# Patient Record
Sex: FEMALE | Race: WHITE | NOT HISPANIC OR LATINO | Employment: OTHER | URBAN - METROPOLITAN AREA
[De-identification: names, ages, dates, MRNs, and addresses within clinical notes are randomized per-mention and may not be internally consistent; named-entity substitution may affect disease eponyms.]

---

## 2017-02-08 ENCOUNTER — TELEPHONE (OUTPATIENT)
Dept: FAMILY MEDICINE CLINIC | Facility: CLINIC | Age: 82
End: 2017-02-08

## 2017-02-08 NOTE — TELEPHONE ENCOUNTER
Referral to gaurav martinez for scope queztional bleeding ulcer +hemocult had been suggested before xmas pt wanted to wait ready to go now still problem

## 2017-02-09 DIAGNOSIS — R19.5 HEME POSITIVE STOOL: Primary | ICD-10-CM

## 2017-02-10 RX ORDER — ESOMEPRAZOLE MAGNESIUM 40 MG/1
CAPSULE, DELAYED RELEASE ORAL
Qty: 90 CAPSULE | Refills: 2 | Status: SHIPPED | OUTPATIENT
Start: 2017-02-10 | End: 2017-11-30

## 2017-03-13 RX ORDER — ROSUVASTATIN CALCIUM 5 MG/1
TABLET, COATED ORAL
Qty: 30 TABLET | Refills: 0 | Status: SHIPPED | OUTPATIENT
Start: 2017-03-13 | End: 2017-04-11 | Stop reason: SDUPTHER

## 2017-04-11 RX ORDER — ROSUVASTATIN CALCIUM 5 MG/1
TABLET, COATED ORAL
Qty: 30 TABLET | Refills: 0 | Status: SHIPPED | OUTPATIENT
Start: 2017-04-11 | End: 2017-05-17 | Stop reason: SDUPTHER

## 2017-05-02 RX ORDER — LISINOPRIL 40 MG/1
TABLET ORAL
Qty: 30 TABLET | Refills: 2 | Status: SHIPPED | OUTPATIENT
Start: 2017-05-02 | End: 2017-08-02 | Stop reason: SDUPTHER

## 2017-05-17 RX ORDER — ROSUVASTATIN CALCIUM 5 MG/1
TABLET, COATED ORAL
Qty: 30 TABLET | Refills: 3 | Status: SHIPPED | OUTPATIENT
Start: 2017-05-17 | End: 2017-09-08 | Stop reason: SDUPTHER

## 2017-08-02 RX ORDER — LISINOPRIL 40 MG/1
TABLET ORAL
Qty: 30 TABLET | Refills: 0 | Status: SHIPPED | OUTPATIENT
Start: 2017-08-02 | End: 2017-08-31 | Stop reason: SDUPTHER

## 2017-08-31 RX ORDER — LISINOPRIL 40 MG/1
TABLET ORAL
Qty: 30 TABLET | Refills: 0 | Status: SHIPPED | OUTPATIENT
Start: 2017-08-31 | End: 2017-10-01 | Stop reason: SDUPTHER

## 2017-09-08 ENCOUNTER — OFFICE VISIT (OUTPATIENT)
Dept: FAMILY MEDICINE CLINIC | Facility: CLINIC | Age: 82
End: 2017-09-08

## 2017-09-08 ENCOUNTER — TELEPHONE (OUTPATIENT)
Dept: FAMILY MEDICINE CLINIC | Facility: CLINIC | Age: 82
End: 2017-09-08

## 2017-09-08 VITALS
WEIGHT: 97 LBS | HEART RATE: 57 BPM | RESPIRATION RATE: 10 BRPM | DIASTOLIC BLOOD PRESSURE: 88 MMHG | TEMPERATURE: 97.8 F | OXYGEN SATURATION: 98 % | HEIGHT: 59 IN | BODY MASS INDEX: 19.56 KG/M2 | SYSTOLIC BLOOD PRESSURE: 172 MMHG

## 2017-09-08 DIAGNOSIS — M54.40 CHRONIC LEFT-SIDED LOW BACK PAIN WITH SCIATICA, SCIATICA LATERALITY UNSPECIFIED: ICD-10-CM

## 2017-09-08 DIAGNOSIS — N63.0 BREAST NODULE: ICD-10-CM

## 2017-09-08 DIAGNOSIS — R27.0 ATAXIA: ICD-10-CM

## 2017-09-08 DIAGNOSIS — M25.562 LEFT KNEE PAIN, UNSPECIFIED CHRONICITY: ICD-10-CM

## 2017-09-08 DIAGNOSIS — Z00.00 MEDICARE ANNUAL WELLNESS VISIT, INITIAL: Primary | ICD-10-CM

## 2017-09-08 DIAGNOSIS — E55.9 VITAMIN D DEFICIENCY: ICD-10-CM

## 2017-09-08 DIAGNOSIS — G89.29 CHRONIC LEFT-SIDED LOW BACK PAIN WITH SCIATICA, SCIATICA LATERALITY UNSPECIFIED: ICD-10-CM

## 2017-09-08 DIAGNOSIS — I10 ESSENTIAL HYPERTENSION: ICD-10-CM

## 2017-09-08 DIAGNOSIS — E78.2 MIXED HYPERLIPIDEMIA: ICD-10-CM

## 2017-09-08 DIAGNOSIS — K21.00 GASTROESOPHAGEAL REFLUX DISEASE WITH ESOPHAGITIS: ICD-10-CM

## 2017-09-08 LAB
25(OH)D3 SERPL-MCNC: 21.4 NG/ML (ref 30–100)
ALBUMIN SERPL-MCNC: 4.8 G/DL (ref 3.5–5.2)
ALBUMIN/GLOB SERPL: 1.8 G/DL
ALP SERPL-CCNC: 64 U/L (ref 39–117)
ALT SERPL W P-5'-P-CCNC: 10 U/L (ref 1–33)
ANION GAP SERPL CALCULATED.3IONS-SCNC: 13.4 MMOL/L
AST SERPL-CCNC: 16 U/L (ref 1–32)
BILIRUB SERPL-MCNC: 0.6 MG/DL (ref 0.1–1.2)
BUN BLD-MCNC: 8 MG/DL (ref 8–23)
BUN/CREAT SERPL: 11.6 (ref 7–25)
CALCIUM SPEC-SCNC: 10.2 MG/DL (ref 8.2–9.6)
CHLORIDE SERPL-SCNC: 99 MMOL/L (ref 98–107)
CHOLEST SERPL-MCNC: 170 MG/DL (ref 0–200)
CO2 SERPL-SCNC: 27.6 MMOL/L (ref 22–29)
CREAT BLD-MCNC: 0.69 MG/DL (ref 0.57–1)
ERYTHROCYTE [DISTWIDTH] IN BLOOD BY AUTOMATED COUNT: 13.7 % (ref 4.5–15)
FOLATE SERPL-MCNC: >20 NG/ML (ref 4.78–24.2)
GFR SERPL CREATININE-BSD FRML MDRD: 80 ML/MIN/1.73
GLOBULIN UR ELPH-MCNC: 2.6 GM/DL
GLUCOSE BLD-MCNC: 99 MG/DL (ref 65–99)
HCT VFR BLD AUTO: 38.8 % (ref 31–42)
HDLC SERPL-MCNC: 89 MG/DL (ref 40–60)
HGB BLD-MCNC: 12.8 G/DL (ref 12–18)
LDLC SERPL CALC-MCNC: 59 MG/DL (ref 0–100)
LDLC/HDLC SERPL: 0.66 {RATIO}
LYMPHOCYTES # BLD AUTO: 1.6 10*3/MM3 (ref 1.2–3.4)
LYMPHOCYTES NFR BLD AUTO: 29.8 % (ref 21–51)
MCH RBC QN AUTO: 30.1 PG (ref 26.1–33.1)
MCHC RBC AUTO-ENTMCNC: 33.1 G/DL (ref 33–37)
MCV RBC AUTO: 91 FL (ref 80–99)
MONOCYTES # BLD AUTO: 0.2 10*3/MM3 (ref 0.1–0.6)
MONOCYTES NFR BLD AUTO: 4.6 % (ref 2–9)
NEUTROPHILS # BLD AUTO: 3.5 10*3/MM3 (ref 1.4–6.5)
NEUTROPHILS NFR BLD AUTO: 65.6 % (ref 42–75)
PLATELET # BLD AUTO: 315 10*3/MM3 (ref 150–450)
PMV BLD AUTO: 7.8 FL (ref 7.1–10.5)
POTASSIUM BLD-SCNC: 4 MMOL/L (ref 3.5–5.2)
PROT SERPL-MCNC: 7.4 G/DL (ref 6–8.5)
RBC # BLD AUTO: 4.26 10*6/MM3 (ref 4–6)
SODIUM BLD-SCNC: 140 MMOL/L (ref 136–145)
TRIGL SERPL-MCNC: 112 MG/DL (ref 0–150)
VIT B12 BLD-MCNC: >2000 PG/ML (ref 211–946)
VLDLC SERPL-MCNC: 22.4 MG/DL (ref 5–40)
WBC NRBC COR # BLD: 5.4 10*3/MM3 (ref 4.5–10)

## 2017-09-08 PROCEDURE — 82746 ASSAY OF FOLIC ACID SERUM: CPT | Performed by: INTERNAL MEDICINE

## 2017-09-08 PROCEDURE — 99214 OFFICE O/P EST MOD 30 MIN: CPT | Performed by: INTERNAL MEDICINE

## 2017-09-08 PROCEDURE — 82607 VITAMIN B-12: CPT | Performed by: INTERNAL MEDICINE

## 2017-09-08 PROCEDURE — 82306 VITAMIN D 25 HYDROXY: CPT | Performed by: INTERNAL MEDICINE

## 2017-09-08 PROCEDURE — 80053 COMPREHEN METABOLIC PANEL: CPT | Performed by: INTERNAL MEDICINE

## 2017-09-08 PROCEDURE — 36415 COLL VENOUS BLD VENIPUNCTURE: CPT | Performed by: INTERNAL MEDICINE

## 2017-09-08 PROCEDURE — 80061 LIPID PANEL: CPT | Performed by: INTERNAL MEDICINE

## 2017-09-08 PROCEDURE — G0438 PPPS, INITIAL VISIT: HCPCS | Performed by: INTERNAL MEDICINE

## 2017-09-08 PROCEDURE — 96372 THER/PROPH/DIAG INJ SC/IM: CPT | Performed by: INTERNAL MEDICINE

## 2017-09-08 PROCEDURE — 72100 X-RAY EXAM L-S SPINE 2/3 VWS: CPT | Performed by: INTERNAL MEDICINE

## 2017-09-08 PROCEDURE — 73560 X-RAY EXAM OF KNEE 1 OR 2: CPT | Performed by: INTERNAL MEDICINE

## 2017-09-08 PROCEDURE — 85025 COMPLETE CBC W/AUTO DIFF WBC: CPT | Performed by: INTERNAL MEDICINE

## 2017-09-08 RX ORDER — TRAMADOL HYDROCHLORIDE 50 MG/1
50 TABLET ORAL EVERY 6 HOURS PRN
Qty: 30 TABLET | Refills: 3 | Status: SHIPPED | OUTPATIENT
Start: 2017-09-08 | End: 2017-12-08 | Stop reason: HOSPADM

## 2017-09-08 RX ORDER — CYANOCOBALAMIN 1000 UG/ML
1000 INJECTION, SOLUTION INTRAMUSCULAR; SUBCUTANEOUS
Status: DISCONTINUED | OUTPATIENT
Start: 2017-09-08 | End: 2018-02-16

## 2017-09-08 RX ORDER — AMLODIPINE BESYLATE 5 MG/1
5 TABLET ORAL DAILY
Qty: 30 TABLET | Refills: 3 | Status: SHIPPED | OUTPATIENT
Start: 2017-09-08 | End: 2018-03-07 | Stop reason: SDUPTHER

## 2017-09-08 RX ADMIN — CYANOCOBALAMIN 1000 MCG: 1000 INJECTION, SOLUTION INTRAMUSCULAR; SUBCUTANEOUS at 12:19

## 2017-09-08 NOTE — PROGRESS NOTES
QUICK REFERENCE INFORMATION:  The ABCs of the Annual Wellness Visit    Initial Medicare Wellness Visit    HEALTH RISK ASSESSMENT    5/20/1927    Recent Hospitalizations:  No hospitalization(s) within the last year..        Current Medical Providers:  Patient Care Team:  George Rock MD as PCP - General  Pastor Crews MD as Consulting Physician (Gastroenterology)        Smoking Status:  History   Smoking Status   • Never Smoker   Smokeless Tobacco   • Not on file       Alcohol Consumption:  History   Alcohol Use No       Depression Screen:   PHQ-2/PHQ-9 Depression Screening 9/8/2017   Little interest or pleasure in doing things 0   Feeling down, depressed, or hopeless 0   Trouble falling or staying asleep, or sleeping too much 0   Feeling tired or having little energy 0   Poor appetite or overeating 0   Feeling bad about yourself - or that you are a failure or have let yourself or your family down 0   Trouble concentrating on things, such as reading the newspaper or watching television 0   Moving or speaking so slowly that other people could have noticed. Or the opposite - being so fidgety or restless that you have been moving around a lot more than usual 0   Thoughts that you would be better off dead, or of hurting yourself in some way 0   Total Score 0   If you checked off any problems, how difficult have these problems made it for you to do your work, take care of things at home, or get along with other people? Not difficult at all       Health Habits and Functional and Cognitive Screening:  Functional & Cognitive Status 9/8/2017   Do you have difficulty preparing food and eating? No   Do you have difficulty bathing yourself? No   Do you have difficulty getting dressed? No   Do you have difficulty using the toilet? No   Do you have difficulty moving around from place to place? No   In the past year have you fallen or experienced a near fall? No   Do you need help using the phone?  No   Are you deaf or do you  have serious difficulty hearing?  No   Do you need help with transportation? No   Do you need help shopping? No   Do you need help preparing meals?  No   Do you need help with housework?  No   Do you need help with laundry? No   Do you need help taking your medications? No   Do you need help managing money? No   Do you have difficulty concentrating, remembering or making decisions? No       Health Habits  Current Diet: Well Balanced Diet  Dental Exam: Up to date  Eye Exam: Up to date  Exercise (times per week): 7 times per week  Current Exercise Activities Include: Gardening          Does the patient have evidence of cognitive impairment? No    Asiprin use counseling: Does not need ASA (and currently is not on it)      Recent Lab Results:    Visual Acuity:  No exam data present    Age-appropriate Screening Schedule:  Refer to the list below for future screening recommendations based on patient's age, sex and/or medical conditions. Orders for these recommended tests are listed in the plan section. The patient has been provided with a written plan.    Health Maintenance   Topic Date Due   • TDAP/TD VACCINES (1 - Tdap) 05/20/1946   • ZOSTER VACCINE  06/21/2016   • MAMMOGRAM  06/22/2016   • DIABETIC FOOT EXAM  12/16/2016   • HEMOGLOBIN A1C  06/23/2017   • LIPID PANEL  12/23/2017   • PNEUMOCOCCAL VACCINES (65+ LOW/MEDIUM RISK) (2 of 2 - PPSV23) 09/08/2018   • DIABETIC EYE EXAM  09/08/2018   • INFLUENZA VACCINE  Addressed        Subjective   History of Present Illness    Cristal Sams is a 89 y.o. female who presents for an Annual Wellness Visit.    The following portions of the patient's history were reviewed and updated as appropriate: allergies, current medications, past family history, past medical history, past social history, past surgical history and problem list.    Outpatient Medications Prior to Visit   Medication Sig Dispense Refill   • CRESTOR 5 MG tablet TAKE ONE TABLET BY MOUTH DAILY 30 tablet 3   •  "esomeprazole (nexIUM) 40 MG capsule TAKE ONE CAPSULE BY MOUTH DAILY 90 capsule 2   • glucose blood (ONE TOUCH ULTRA TEST) test strip Test Blood Sugar Once Daily 100 each 2   • Lancets (ONETOUCH ULTRASOFT) lancets E11.9  Checks once daily 100 each 3   • lisinopril (PRINIVIL,ZESTRIL) 40 MG tablet TAKE ONE TABLET BY MOUTH DAILY 30 tablet 0   • Iron, Ferrous Gluconate, 256 (28 FE) MG tablet Take 1 tablet by mouth Daily. 30 tablet 3   • rosuvastatin (CRESTOR) 5 MG tablet TAKE ONE TABLET BY MOUTH DAILY 30 tablet 3   • vitamin D (ERGOCALCIFEROL) 41144 UNITS capsule capsule Take 1 capsule by mouth 1 (One) Time Per Week. 30 capsule 1     Facility-Administered Medications Prior to Visit   Medication Dose Route Frequency Provider Last Rate Last Dose   • cyanocobalamin injection 1,000 mcg  1,000 mcg Intramuscular Q28 Days George Rock MD   1,000 mcg at 12/23/16 1527       Patient Active Problem List   Diagnosis   • Osteoarthritis of multiple joints   • Hyperlipemia   • Gastroesophageal reflux disease with esophagitis   • B12 deficiency   • Essential hypertension   • Anemia   • DM (diabetes mellitus), type 2   • Heme positive stool   • Ataxia       Advance Care Planning:  has an advance directive - a copy HAS NOT been provided. Have asked the patient to send this to us to add to record.    Identification of Risk Factors:  Risk factors include: NA.    Review of Systems    Compared to one year ago, the patient feels her physical health is worse.  Compared to one year ago, the patient feels her mental health is worse.    Objective     Physical Exam    Vitals:    09/08/17 1138   BP: 172/88   BP Location: Left arm   Patient Position: Sitting   Cuff Size: Adult   Pulse: 57   Resp: 10   Temp: 97.8 °F (36.6 °C)   TempSrc: Oral   SpO2: 98%   Weight: 97 lb (44 kg)   Height: 59\" (149.9 cm)   PainSc:   4   PainLoc: Leg       Body mass index is 19.59 kg/(m^2).  Discussed the patient's BMI with her. The BMI is in the acceptable " range.    Assessment/Plan   Patient Self-Management and Personalized Health Advice  The patient has been provided with information about: NA and preventive services including:   · NA.    Visit Diagnoses:    ICD-10-CM ICD-9-CM   1. Essential hypertension I10 401.9   2. Mixed hyperlipidemia E78.2 272.2   3. Gastroesophageal reflux disease with esophagitis K21.0 530.11   4. Ataxia R27.0 781.3       Orders Placed This Encounter   Procedures   • Walker     Order Specific Question:   Equipment     Answer:    Standard Walker Folding     Order Specific Question:   Length of Need (99 Months = Lifetime)     Answer:   99 Months = Lifetime       Outpatient Encounter Prescriptions as of 9/8/2017   Medication Sig Dispense Refill   • CRESTOR 5 MG tablet TAKE ONE TABLET BY MOUTH DAILY 30 tablet 3   • esomeprazole (nexIUM) 40 MG capsule TAKE ONE CAPSULE BY MOUTH DAILY 90 capsule 2   • glucose blood (ONE TOUCH ULTRA TEST) test strip Test Blood Sugar Once Daily 100 each 2   • Lancets (ONETOUCH ULTRASOFT) lancets E11.9  Checks once daily 100 each 3   • lisinopril (PRINIVIL,ZESTRIL) 40 MG tablet TAKE ONE TABLET BY MOUTH DAILY 30 tablet 0   • Iron, Ferrous Gluconate, 256 (28 FE) MG tablet Take 1 tablet by mouth Daily. 30 tablet 3   • [DISCONTINUED] rosuvastatin (CRESTOR) 5 MG tablet TAKE ONE TABLET BY MOUTH DAILY 30 tablet 3   • [DISCONTINUED] vitamin D (ERGOCALCIFEROL) 97281 UNITS capsule capsule Take 1 capsule by mouth 1 (One) Time Per Week. 30 capsule 1     Facility-Administered Encounter Medications as of 9/8/2017   Medication Dose Route Frequency Provider Last Rate Last Dose   • cyanocobalamin injection 1,000 mcg  1,000 mcg Intramuscular Q28 Days George Rock MD   1,000 mcg at 12/23/16 1527       Reviewed use of high risk medication in the elderly: not applicable  Reviewed for potential of harmful drug interactions in the elderly: not applicable    Follow Up:  No Follow-up on file.     An After Visit Summary and PPPS with  all of these plans were given to the patient.

## 2017-09-08 NOTE — PROGRESS NOTES
Subjective   Cristal Sams is a 89 y.o. female.     History of Present Illness   Patient was seen today for Medicare wellness visit.  She is also developed over last several weeks severe pain rating down her left leg.  She had a prior history of trauma to her left knee.  The pain is worse with standing or walking and relieved with sitting.  She will not take any Medication.  Her knee x-ray was normal but her L-spine shows severe scoliosis of lumbar spine with severe degenerative changes.  She also had an inverted left nipple with a nodule and mammogram was ordered she had a back brace ordered with physical therapy and tramadol advised to take with Tylenol.  She was also given movantik for possible drug-induced constipation.    X-Ray  Interpretation report in house X-rays that I personally viewed    Relevant Clinical Issues/Diagnoses/Indications:  Low back pain L-spine        Clinical Findings:  #1 severe scoliosis #2 severe degenerative changes along the lumbar spine          Comparative Data:  No previous x-ray          Date of Previous X-ray:  Change on current X-ray    X-Ray  Interpretation report in house X-rays that I personally viewed    Relevant Clinical Issues/Diagnoses/Indications:  Left knee pain knee x-ray        Clinical Findings:  No acute disease          Comparative Data:  No previous x-ray          Date of Previous X-ray:  Change on current X-ray    Much of this encounter note is an electronic transcription/translation of spoken language to printed text.  The electronic translation of spoken language may permit erroneous, or at times, nonsensical words or phrases to be inadvertently transcribed.  Although I have reviewed the note for such errors, some may still exist.      The following portions of the patient's history were reviewed and updated as appropriate: allergies, current medications, past family history, past medical history, past social history, past surgical history and problem  list.    Review of Systems   Constitutional: Negative for fatigue and fever.   HENT: Positive for congestion. Negative for trouble swallowing.    Eyes: Negative for discharge and visual disturbance.   Respiratory: Negative for choking and shortness of breath.    Cardiovascular: Negative for chest pain and palpitations.   Gastrointestinal: Negative for abdominal pain and blood in stool.   Endocrine: Negative.    Genitourinary: Negative for genital sores and hematuria.   Musculoskeletal: Positive for back pain. Negative for gait problem and joint swelling.        Severe left knee pain   Skin: Negative for color change, pallor, rash and wound.   Allergic/Immunologic: Positive for environmental allergies. Negative for immunocompromised state.   Neurological: Negative for facial asymmetry and speech difficulty.   Psychiatric/Behavioral: Negative for hallucinations and suicidal ideas.       Objective   Physical Exam   Constitutional: She is oriented to person, place, and time. She appears well-developed and well-nourished.   HENT:   Head: Normocephalic.   Eyes: Conjunctivae are normal. Pupils are equal, round, and reactive to light.   Neck: Normal range of motion. Neck supple.   Cardiovascular: Normal rate, regular rhythm and normal heart sounds.    Pulmonary/Chest: Effort normal and breath sounds normal.   Abdominal: Soft. Bowel sounds are normal.   Musculoskeletal: She exhibits edema, tenderness and deformity.   Severe pain flexion extension of left leg tenderness palpation along the lumbar spine and pain to palpation of the anterior portion of the left knee   Neurological: She is alert and oriented to person, place, and time.   Skin: Skin is warm and dry.   Psychiatric: She has a normal mood and affect. Her behavior is normal. Judgment and thought content normal.   Nursing note and vitals reviewed.      Assessment/Plan   Problems Addressed this Visit        Cardiovascular and Mediastinum    Hyperlipemia    Relevant  Medications    cyanocobalamin injection 1,000 mcg    Other Relevant Orders    CBC & Differential (Completed)    Comprehensive Metabolic Panel    Lipid Panel    Vitamin D 25 Hydroxy    Vitamin B12 & Folate    CBC Auto Differential (Completed)    Ambulatory Referral to Physical Therapy (Completed)    Back Brace    Essential hypertension    Relevant Medications    amLODIPine (NORVASC) 5 MG tablet    cyanocobalamin injection 1,000 mcg    Other Relevant Orders    CBC & Differential (Completed)    Comprehensive Metabolic Panel    Lipid Panel    Vitamin D 25 Hydroxy    Vitamin B12 & Folate    CBC Auto Differential (Completed)    Ambulatory Referral to Physical Therapy (Completed)    Back Brace       Digestive    Gastroesophageal reflux disease with esophagitis    Relevant Medications    cyanocobalamin injection 1,000 mcg    Other Relevant Orders    CBC & Differential (Completed)    Comprehensive Metabolic Panel    Lipid Panel    Vitamin D 25 Hydroxy    Vitamin B12 & Folate    CBC Auto Differential (Completed)    Ambulatory Referral to Physical Therapy (Completed)    Back Brace       Other    Ataxia    Relevant Medications    cyanocobalamin injection 1,000 mcg    Other Relevant Orders    Walker    CBC & Differential (Completed)    Comprehensive Metabolic Panel    Lipid Panel    Vitamin D 25 Hydroxy    Vitamin B12 & Folate    CBC Auto Differential (Completed)    Ambulatory Referral to Physical Therapy (Completed)    Back Brace      Other Visit Diagnoses     Medicare annual wellness visit, initial    -  Primary    Relevant Medications    cyanocobalamin injection 1,000 mcg    Other Relevant Orders    CBC & Differential (Completed)    Comprehensive Metabolic Panel    Lipid Panel    Vitamin D 25 Hydroxy    Vitamin B12 & Folate    CBC Auto Differential (Completed)    Ambulatory Referral to Physical Therapy (Completed)    Back Brace    Left knee pain, unspecified chronicity        Relevant Medications    cyanocobalamin injection  1,000 mcg    Other Relevant Orders    XR Knee 1 or 2 View Left (Completed)    XR Spine Lumbar 2 or 3 View (Completed)    CBC & Differential (Completed)    Comprehensive Metabolic Panel    Lipid Panel    Vitamin D 25 Hydroxy    Vitamin B12 & Folate    CBC Auto Differential (Completed)    Ambulatory Referral to Physical Therapy (Completed)    Back Brace    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, sciatica laterality unspecified        Relevant Medications    cyanocobalamin injection 1,000 mcg    Other Relevant Orders    XR Knee 1 or 2 View Left (Completed)    XR Spine Lumbar 2 or 3 View (Completed)    CBC & Differential (Completed)    Comprehensive Metabolic Panel    Lipid Panel    Vitamin D 25 Hydroxy    Vitamin B12 & Folate    CBC Auto Differential (Completed)    Ambulatory Referral to Physical Therapy (Completed)    Back Brace    Vitamin D deficiency         Relevant Orders    Vitamin D 25 Hydroxy    Ambulatory Referral to Physical Therapy (Completed)    Back Brace    Breast nodule        Relevant Orders    Mammo Diagnostic Bilateral With CAD

## 2017-09-08 NOTE — PATIENT INSTRUCTIONS
Medicare Wellness  Personal Prevention Plan of Service     Date of Office Visit:  2017  Encounter Provider:  George Rock MD  Place of Service:  [facility]  Patient Name: Cristal Sams  :  1927    As part of the Medicare Wellness portion of your visit today, we are providing you with this personalized preventive plan of services (PPPS). This plan is based upon recommendations of the [place] Preventive Services Task Force (USPSTF) and the Advisory Committee on Immunization Practices (ACIP).    This lists the preventive care services that should be considered, and provides dates of when you are due. Items listed as completed are up-to-date and do not require any further intervention.    Health Maintenance   Topic Date Due   • TDAP/TD VACCINES (1 - Tdap) 1946   • MEDICARE ANNUAL WELLNESS  2016   • ZOSTER VACCINE  2016   • MAMMOGRAM  2016   • DIABETIC FOOT EXAM  2016   • HEMOGLOBIN A1C  2017   • LIPID PANEL  2017   • PNEUMOCOCCAL VACCINES (65+ LOW/MEDIUM RISK) (2 of 2 - PPSV23) 2018   • DIABETIC EYE EXAM  2018   • INFLUENZA VACCINE  Addressed       Orders Placed This Encounter   Procedures   • Walker     Order Specific Question:   Equipment     Answer:    Standard Walker Folding     Order Specific Question:   Length of Need (99 Months = Lifetime)     Answer:   99 Months = Lifetime       No Follow-up on file.

## 2017-09-08 NOTE — TELEPHONE ENCOUNTER
PATIENTS BLOOD PRESSURE IS HIGH AND LEG IS PAINFUL. PATIENT WENT TO [facility] LAST NIGHT WAS THERE FOR OVER 3  HOURS AND ENDED UP LEAVING. PATIENT WANT TO KNOW IF SHE CAN BE WORKED IN

## 2017-09-12 ENCOUNTER — TELEPHONE (OUTPATIENT)
Dept: FAMILY MEDICINE CLINIC | Facility: CLINIC | Age: 82
End: 2017-09-12

## 2017-09-12 NOTE — TELEPHONE ENCOUNTER
PATIENT IS SUPPOSE TO COME IN 4 DAYS AFTER HER MAMMOGRAM AND THAT FALL ON THE WEEKEND. WANTS TO KNOW WHEN SHE'S SUPPOSE TO COME IN. PATIENT WANTS TO TALK TO YOU ABOUT A COUPLE OTHER THINGS

## 2017-09-13 ENCOUNTER — HOSPITAL ENCOUNTER (OUTPATIENT)
Dept: ULTRASOUND IMAGING | Facility: HOSPITAL | Age: 82
Discharge: HOME | End: 2017-09-13

## 2017-09-13 ENCOUNTER — HOSPITAL ENCOUNTER (OUTPATIENT)
Dept: MAMMOGRAPHY | Facility: HOSPITAL | Age: 82
Discharge: HOME | End: 2017-09-13
Admitting: INTERNAL MEDICINE

## 2017-09-13 DIAGNOSIS — N63.0 BREAST NODULE: ICD-10-CM

## 2017-09-13 PROCEDURE — 76642 ULTRASOUND BREAST LIMITED: CPT

## 2017-09-13 PROCEDURE — G0204 DX MAMMO INCL CAD BI: HCPCS

## 2017-09-14 ENCOUNTER — TELEPHONE (OUTPATIENT)
Dept: FAMILY MEDICINE CLINIC | Facility: CLINIC | Age: 82
End: 2017-09-14

## 2017-09-14 DIAGNOSIS — N63.0 BREAST NODULE: Primary | ICD-10-CM

## 2017-09-18 ENCOUNTER — TELEPHONE (OUTPATIENT)
Dept: FAMILY MEDICINE CLINIC | Facility: CLINIC | Age: 82
End: 2017-09-18

## 2017-09-18 NOTE — TELEPHONE ENCOUNTER
PATIENT HAS AN APPOINTMENT TOMORROW WITH [facility] FOR ULTRASOUND OF LEFT BREAST BIOPSY. PATIENT ALSO HAS APPT. TO SEE DR. HERMAN TOMORROW AT 5:15 PATIENT WANTS TO KNOW IF SHE NEED TO KEEP APPT. WITH DR. HERMAN

## 2017-09-18 NOTE — TELEPHONE ENCOUNTER
Pt informed Dr Rock does want to see her again but if the appt time conflicts with U/S and biospy she can reschedule for another day

## 2017-09-19 ENCOUNTER — OFFICE VISIT (OUTPATIENT)
Dept: FAMILY MEDICINE CLINIC | Facility: CLINIC | Age: 82
End: 2017-09-19

## 2017-09-19 VITALS
HEART RATE: 56 BPM | WEIGHT: 98 LBS | OXYGEN SATURATION: 98 % | DIASTOLIC BLOOD PRESSURE: 80 MMHG | TEMPERATURE: 97.5 F | RESPIRATION RATE: 14 BRPM | BODY MASS INDEX: 19.76 KG/M2 | HEIGHT: 59 IN | SYSTOLIC BLOOD PRESSURE: 140 MMHG

## 2017-09-19 DIAGNOSIS — E11.22 TYPE 2 DIABETES MELLITUS WITH CHRONIC KIDNEY DISEASE, WITHOUT LONG-TERM CURRENT USE OF INSULIN, UNSPECIFIED CKD STAGE (HCC): ICD-10-CM

## 2017-09-19 DIAGNOSIS — E83.52 HYPERCALCEMIA: ICD-10-CM

## 2017-09-19 DIAGNOSIS — E78.5 HYPERLIPIDEMIA, UNSPECIFIED HYPERLIPIDEMIA TYPE: ICD-10-CM

## 2017-09-19 DIAGNOSIS — I10 ESSENTIAL HYPERTENSION: ICD-10-CM

## 2017-09-19 DIAGNOSIS — N63.0 BREAST NODULE: Primary | ICD-10-CM

## 2017-09-19 LAB — PTH-INTACT SERPL-MCNC: 74.2 PG/ML (ref 15–65)

## 2017-09-19 PROCEDURE — 36415 COLL VENOUS BLD VENIPUNCTURE: CPT | Performed by: INTERNAL MEDICINE

## 2017-09-19 PROCEDURE — 83970 ASSAY OF PARATHORMONE: CPT | Performed by: INTERNAL MEDICINE

## 2017-09-19 PROCEDURE — 99214 OFFICE O/P EST MOD 30 MIN: CPT | Performed by: INTERNAL MEDICINE

## 2017-09-19 NOTE — PROGRESS NOTES
Subjective   Cristal Sams is a 89 y.o. female.     History of Present Illness   Patient was seen today for a mammogram result.  She did have her breast nodule left breast an ultrasound-guided biopsies being scheduled.  She also had lab work back calcium level was 10.2 and PTH level was checked today.  Her vitamin D level was 21.7 she was advised to 50,000 units vitamin D weekly.  Her triglycerides were 112, HDL 89, LDL 59, blood sugar 99.    Much of this encounter note is an electronic transcription/translation of spoken language to printed text.  The electronic translation of spoken language may permit erroneous, or at times, nonsensical words or phrases to be inadvertently transcribed.  Although I have reviewed the note for such errors, some may still exist.  The following portions of the patient's history were reviewed and updated as appropriate: allergies, current medications, past family history, past medical history, past social history, past surgical history and problem list.    Review of Systems   Constitutional: Negative for fatigue and fever.   HENT: Positive for congestion. Negative for trouble swallowing.    Eyes: Negative for discharge and visual disturbance.   Respiratory: Negative for choking and shortness of breath.    Cardiovascular: Negative for chest pain and palpitations.   Gastrointestinal: Negative for abdominal pain and blood in stool.   Endocrine: Negative.    Genitourinary: Negative for genital sores and hematuria.   Musculoskeletal: Negative for gait problem and joint swelling.   Skin: Negative for color change, pallor, rash and wound.   Allergic/Immunologic: Positive for environmental allergies. Negative for immunocompromised state.   Neurological: Negative for facial asymmetry and speech difficulty.   Psychiatric/Behavioral: Negative for hallucinations and suicidal ideas.       Objective   Physical Exam   Constitutional: She is oriented to person, place, and time. She appears  well-developed and well-nourished.   HENT:   Head: Normocephalic.   Eyes: Conjunctivae are normal. Pupils are equal, round, and reactive to light.   Neck: Normal range of motion. Neck supple.   Cardiovascular: Normal rate, regular rhythm and normal heart sounds.    Pulmonary/Chest: Effort normal and breath sounds normal.   Abdominal: Soft. Bowel sounds are normal.   Musculoskeletal: Normal range of motion.   Neurological: She is alert and oriented to person, place, and time.   Skin: Skin is warm and dry.   Psychiatric: She has a normal mood and affect. Her behavior is normal. Judgment and thought content normal.   Nursing note and vitals reviewed.      Assessment/Plan   Problems Addressed this Visit        Cardiovascular and Mediastinum    Hyperlipemia    Essential hypertension       Endocrine    DM (diabetes mellitus), type 2       Other    Breast nodule - Primary    Hypercalcemia    Relevant Orders    PTH, Intact

## 2017-09-20 ENCOUNTER — TELEPHONE (OUTPATIENT)
Dept: FAMILY MEDICINE CLINIC | Facility: CLINIC | Age: 82
End: 2017-09-20

## 2017-09-20 DIAGNOSIS — R79.89 ELEVATED PTHRP LEVEL: Primary | ICD-10-CM

## 2017-09-20 NOTE — TELEPHONE ENCOUNTER
Pt would like to know if she is being set up for a biopsy for her breast and to go see someone for her thyroid

## 2017-09-26 ENCOUNTER — HOSPITAL ENCOUNTER (OUTPATIENT)
Dept: ULTRASOUND IMAGING | Facility: HOSPITAL | Age: 82
Discharge: HOME | End: 2017-09-26
Admitting: INTERNAL MEDICINE

## 2017-09-26 VITALS
TEMPERATURE: 95.9 F | HEART RATE: 60 BPM | WEIGHT: 95 LBS | DIASTOLIC BLOOD PRESSURE: 63 MMHG | OXYGEN SATURATION: 100 % | HEIGHT: 60 IN | RESPIRATION RATE: 18 BRPM | BODY MASS INDEX: 18.65 KG/M2 | SYSTOLIC BLOOD PRESSURE: 131 MMHG

## 2017-09-26 DIAGNOSIS — N63.0 BREAST NODULE: ICD-10-CM

## 2017-09-26 PROCEDURE — A4648 IMPLANTABLE TISSUE MARKER: HCPCS

## 2017-09-26 PROCEDURE — 88305 TISSUE EXAM BY PATHOLOGIST: CPT | Performed by: INTERNAL MEDICINE

## 2017-09-26 PROCEDURE — 88377 M/PHMTRC ALYS ISHQUANT/SEMIQ: CPT

## 2017-09-26 PROCEDURE — 88360 TUMOR IMMUNOHISTOCHEM/MANUAL: CPT

## 2017-09-26 RX ORDER — LIDOCAINE HYDROCHLORIDE 10 MG/ML
20 INJECTION, SOLUTION INFILTRATION; PERINEURAL ONCE
Status: COMPLETED | OUTPATIENT
Start: 2017-09-26 | End: 2017-09-26

## 2017-09-26 RX ADMIN — LIDOCAINE HYDROCHLORIDE 20 ML: 10 INJECTION, SOLUTION INFILTRATION; PERINEURAL at 10:36

## 2017-09-26 NOTE — NURSING NOTE
Biopsy site to left breast clear with Skin Affix dry and intact. No new firmness or swelling noted at or around biopsy site. Denies pain. Ice pack with protective covering applied to biopsy site. Discharge instructions discussed with understanding voiced by patient. Copies provided to patient. No distress noted. To home via private vehicle accompanied by her niece.

## 2017-09-27 PROBLEM — N63.0 BREAST NODULE: Status: RESOLVED | Noted: 2017-09-19 | Resolved: 2017-09-27

## 2017-09-27 PROBLEM — C50.919 MALIGNANT NEOPLASM OF FEMALE BREAST (HCC): Status: ACTIVE | Noted: 2017-09-27

## 2017-09-28 ENCOUNTER — TELEPHONE (OUTPATIENT)
Dept: FAMILY MEDICINE CLINIC | Facility: CLINIC | Age: 82
End: 2017-09-28

## 2017-09-28 DIAGNOSIS — C50.012 MALIGNANT NEOPLASM OF NIPPLE OF LEFT BREAST IN FEMALE (HCC): Primary | ICD-10-CM

## 2017-10-02 RX ORDER — LISINOPRIL 40 MG/1
TABLET ORAL
Qty: 30 TABLET | Refills: 0 | Status: SHIPPED | OUTPATIENT
Start: 2017-10-02 | End: 2017-11-02 | Stop reason: SDUPTHER

## 2017-10-02 RX ORDER — ROSUVASTATIN CALCIUM 5 MG/1
TABLET, COATED ORAL
Qty: 30 TABLET | Refills: 2 | Status: SHIPPED | OUTPATIENT
Start: 2017-10-02 | End: 2017-10-06

## 2017-10-03 LAB
CYTO UR: NORMAL
LAB AP CASE REPORT: NORMAL
LAB AP CLINICAL INFORMATION: NORMAL
LAB AP INTRADEPARTMENTAL CONSULT: NORMAL
Lab: NORMAL
Lab: NORMAL
PATH REPORT.ADDENDUM SPEC: NORMAL
PATH REPORT.FINAL DX SPEC: NORMAL
PATH REPORT.GROSS SPEC: NORMAL

## 2017-10-06 ENCOUNTER — APPOINTMENT (OUTPATIENT)
Dept: ONCOLOGY | Facility: CLINIC | Age: 82
End: 2017-10-06

## 2017-10-06 ENCOUNTER — CONSULT (OUTPATIENT)
Dept: ONCOLOGY | Facility: CLINIC | Age: 82
End: 2017-10-06

## 2017-10-06 ENCOUNTER — TELEPHONE (OUTPATIENT)
Dept: SURGERY | Facility: CLINIC | Age: 82
End: 2017-10-06

## 2017-10-06 ENCOUNTER — LAB (OUTPATIENT)
Dept: LAB | Facility: HOSPITAL | Age: 82
End: 2017-10-06

## 2017-10-06 VITALS
DIASTOLIC BLOOD PRESSURE: 68 MMHG | WEIGHT: 96.8 LBS | OXYGEN SATURATION: 99 % | HEIGHT: 58 IN | RESPIRATION RATE: 16 BRPM | BODY MASS INDEX: 20.32 KG/M2 | HEART RATE: 57 BPM | SYSTOLIC BLOOD PRESSURE: 118 MMHG | TEMPERATURE: 97.6 F

## 2017-10-06 DIAGNOSIS — C50.529: Primary | ICD-10-CM

## 2017-10-06 DIAGNOSIS — C50.912 MALIGNANT NEOPLASM OF LEFT FEMALE BREAST, UNSPECIFIED ESTROGEN RECEPTOR STATUS, UNSPECIFIED SITE OF BREAST (HCC): Primary | ICD-10-CM

## 2017-10-06 DIAGNOSIS — M81.0 AGE RELATED OSTEOPOROSIS, UNSPECIFIED PATHOLOGICAL FRACTURE PRESENCE: ICD-10-CM

## 2017-10-06 DIAGNOSIS — C50.912 MALIGNANT NEOPLASM OF LEFT FEMALE BREAST, UNSPECIFIED ESTROGEN RECEPTOR STATUS, UNSPECIFIED SITE OF BREAST (HCC): ICD-10-CM

## 2017-10-06 DIAGNOSIS — Z17.0: Primary | ICD-10-CM

## 2017-10-06 LAB
25(OH)D3 SERPL-MCNC: 23.5 NG/ML (ref 30–100)
ALBUMIN SERPL-MCNC: 4.4 G/DL (ref 3.5–5.2)
ALBUMIN/GLOB SERPL: 1.7 G/DL (ref 1.1–2.4)
ALP SERPL-CCNC: 61 U/L (ref 38–116)
ALT SERPL W P-5'-P-CCNC: 14 U/L (ref 0–33)
ANION GAP SERPL CALCULATED.3IONS-SCNC: 9.8 MMOL/L
AST SERPL-CCNC: 15 U/L (ref 0–32)
BASOPHILS # BLD AUTO: 0.03 10*3/MM3 (ref 0–0.1)
BASOPHILS NFR BLD AUTO: 0.5 % (ref 0–1.1)
BILIRUB SERPL-MCNC: 0.5 MG/DL (ref 0.1–1.2)
BUN BLD-MCNC: 8 MG/DL (ref 6–20)
BUN/CREAT SERPL: 13.1 (ref 7.3–30)
CALCIUM SPEC-SCNC: 9.8 MG/DL (ref 8.5–10.2)
CHLORIDE SERPL-SCNC: 101 MMOL/L (ref 98–107)
CO2 SERPL-SCNC: 29.2 MMOL/L (ref 22–29)
CREAT BLD-MCNC: 0.61 MG/DL (ref 0.6–1.1)
DEPRECATED RDW RBC AUTO: 45.2 FL (ref 37–49)
EOSINOPHIL # BLD AUTO: 0.06 10*3/MM3 (ref 0–0.36)
EOSINOPHIL NFR BLD AUTO: 1.1 % (ref 1–5)
ERYTHROCYTE [DISTWIDTH] IN BLOOD BY AUTOMATED COUNT: 13.3 % (ref 11.7–14.5)
GFR SERPL CREATININE-BSD FRML MDRD: 92 ML/MIN/1.73
GLOBULIN UR ELPH-MCNC: 2.6 GM/DL (ref 1.8–3.5)
GLUCOSE BLD-MCNC: 97 MG/DL (ref 74–124)
HCT VFR BLD AUTO: 37.7 % (ref 34–45)
HGB BLD-MCNC: 12.5 G/DL (ref 11.5–14.9)
IMM GRANULOCYTES # BLD: 0.02 10*3/MM3 (ref 0–0.03)
IMM GRANULOCYTES NFR BLD: 0.4 % (ref 0–0.5)
LYMPHOCYTES # BLD AUTO: 1.76 10*3/MM3 (ref 1–3.5)
LYMPHOCYTES NFR BLD AUTO: 31.9 % (ref 20–49)
MCH RBC QN AUTO: 30.3 PG (ref 27–33)
MCHC RBC AUTO-ENTMCNC: 33.2 G/DL (ref 32–35)
MCV RBC AUTO: 91.5 FL (ref 83–97)
MONOCYTES # BLD AUTO: 0.43 10*3/MM3 (ref 0.25–0.8)
MONOCYTES NFR BLD AUTO: 7.8 % (ref 4–12)
NEUTROPHILS # BLD AUTO: 3.21 10*3/MM3 (ref 1.5–7)
NEUTROPHILS NFR BLD AUTO: 58.3 % (ref 39–75)
NRBC BLD MANUAL-RTO: 0 /100 WBC (ref 0–0)
PLATELET # BLD AUTO: 247 10*3/MM3 (ref 150–375)
PMV BLD AUTO: 10.5 FL (ref 8.9–12.1)
POTASSIUM BLD-SCNC: 3.9 MMOL/L (ref 3.5–4.7)
PROT SERPL-MCNC: 7 G/DL (ref 6.3–8)
RBC # BLD AUTO: 4.12 10*6/MM3 (ref 3.9–5)
SODIUM BLD-SCNC: 140 MMOL/L (ref 134–145)
WBC NRBC COR # BLD: 5.51 10*3/MM3 (ref 4–10)

## 2017-10-06 PROCEDURE — 36416 COLLJ CAPILLARY BLOOD SPEC: CPT | Performed by: INTERNAL MEDICINE

## 2017-10-06 PROCEDURE — 99205 OFFICE O/P NEW HI 60 MIN: CPT | Performed by: INTERNAL MEDICINE

## 2017-10-06 PROCEDURE — 85025 COMPLETE CBC W/AUTO DIFF WBC: CPT | Performed by: INTERNAL MEDICINE

## 2017-10-06 PROCEDURE — 82306 VITAMIN D 25 HYDROXY: CPT | Performed by: INTERNAL MEDICINE

## 2017-10-06 PROCEDURE — 36415 COLL VENOUS BLD VENIPUNCTURE: CPT | Performed by: INTERNAL MEDICINE

## 2017-10-06 PROCEDURE — 80053 COMPREHEN METABOLIC PANEL: CPT | Performed by: INTERNAL MEDICINE

## 2017-10-06 NOTE — PROGRESS NOTES
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    Provide an opinion on any further workup or treatment                             REQUESTING PHYSICIAN:  Dr. George Rock    RECORDS OBTAINED:  Records of the patients history including those obtained from the referring provider were reviewed and summarized in detail.    HISTORY OF PRESENT ILLNESS:  The patient is a 89 y.o. year old female who is here for an opinion about the above issue.    History of Present Illness patient is a 89-year-old female who has history of hypertension and high cholesterol, history of peptic ulcer disease in the past on  Nexium, was found to have a mass in the left breast for more than a year.  She states that it was a small lesion which gradually increased in size.  She thought it was nothing.  She has a 96-year-old sister who lives with her and had some calcifications which on biopsy were negative.  She thought her lesion in the breast will resolve.  More recently she had pain in the left lower extremity coming from the back as a result she went to Dr. Rock.  He did plain x-rays of the lumbar spine and left knee and at the same time she told him that she had this breast mass.  She then underwent a mammogram.  A bilateral diagnostic mammogram was done.  This showed the left nipple was retracted.  In the retroareolar region of the left breast at the 12 o'clock position there was an irregular 3.1 cm mass.  Left breast skin thickening is also noted.  No evidence for axillary adenopathy is seen in either breast.  No suspicious findings were seen in the right breast.    Ultrasound of the left breast showed that at 12 o'clock position there was an irregular 2.5 cm heterogeneous mass.  No evidence of left axillary adenopathy was appreciated.  This was done on September 13, 2017.  Subsequently patient underwent  ultrasound-guided biopsy on 2017.  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  It is intermediate grade, Evans score is 6.  Invasive carcinoma involves multiple core biopsy fragments spanning at least 10 mm.  Estrogen receptors greater than 90%, progesterone receptor is 40%, HER-2/selwyn is 2+ by immunohistochemistry, HER-2 copy number is 2.2 with a HER-2 CEP ratio of 1.1, negative.  She has been referred here for further options of treatment.    Patient states that she has a cousin, who is her dad's twin brother's daughter who developed breast cancer at age 70.  Her mother had uterine cancer at age 87 and  from it.  She also thinks she has several cousins with breast cancer but they were older but she's unsure how old.    Patient is very active at home.  She cooks, does yard work, she removes weeds, trims  her shrubs.    She has vertigo when she turns towards the left.  This thought to be positional vertigo but she has not been evaluated by ENT.  She had left leg pain and back pain which is now resolved.    Patient had mild increase in calcium at 10.2.  She underwent a PTH level which was slightly elevated at 74.  She has been referred to endocrinology.  Her calcium today though is down to 9.8.  She also had a very low 25-hydroxy vitamin D level at 7.7 and has been placed on 50,000 units of vitamin D3 every weekly.  She has not been taking it since September.    Past Medical History:   Diagnosis Date   • Anemia    • Arthritis    • Colon polyp    • Diabetes mellitus    • GERD (gastroesophageal reflux disease)    • H/O Cataract    • Hepatitis    • History of kidney stone    • History of vertigo    • Hyperlipidemia    • Hypertension    • Hyperthyroidism    • Visual impairment         Past Surgical History:   Procedure Laterality Date   • CATARACT EXTRACTION Bilateral    • DILATATION AND CURETTAGE     • EYE SURGERY       cataract extraction   • SINUS SURGERY          Current Outpatient Prescriptions on File Prior to Visit   Medication Sig Dispense Refill   • amLODIPine (NORVASC) 5 MG tablet Take 1 tablet by mouth Daily. 30 tablet 3   • esomeprazole (nexIUM) 40 MG capsule TAKE ONE CAPSULE BY MOUTH DAILY 90 capsule 2   • glucose blood (ONE TOUCH ULTRA TEST) test strip Test Blood Sugar Once Daily 100 each 2   • Iron, Ferrous Gluconate, 256 (28 FE) MG tablet Take 1 tablet by mouth Daily. 30 tablet 3   • Lancets (ONETOUCH ULTRASOFT) lancets E11.9  Checks once daily 100 each 3   • lisinopril (PRINIVIL,ZESTRIL) 40 MG tablet TAKE ONE TABLET BY MOUTH DAILY ++ DUE FOR AN APPOINTMENT++ 30 tablet 0   • Naloxegol Oxalate (MOVANTIK) 25 MG tablet Take 25 mg by mouth Daily. 30 tablet 3   • traMADol (ULTRAM) 50 MG tablet Take 1 tablet by mouth Every 6 (Six) Hours As Needed for Moderate Pain . 30 tablet 3   • [DISCONTINUED] CRESTOR 5 MG tablet TAKE ONE TABLET BY MOUTH DAILY 30 tablet 3   • [DISCONTINUED] rosuvastatin (CRESTOR) 5 MG tablet TAKE ONE TABLET BY MOUTH DAILY 30 tablet 2     Current Facility-Administered Medications on File Prior to Visit   Medication Dose Route Frequency Provider Last Rate Last Dose   • cyanocobalamin injection 1,000 mcg  1,000 mcg Intramuscular Q28 Days George Rock MD   1,000 mcg at 12/23/16 1527   • cyanocobalamin injection 1,000 mcg  1,000 mcg Intramuscular Q28 Days George Rock MD   1,000 mcg at 09/08/17 1219        ALLERGIES:    Allergies   Allergen Reactions   • Contrast Dye    • Novocain [Procaine] Palpitations and Parasthesia   • Aleve [Naproxen Sodium]    • Cortizone-10 [Hydrocortisone]    • Eye Drops [Naphazoline-Polyethyl Glycol]    • Lipitor [Atorvastatin]    • Sulfur    • Valium [Diazepam]    • Zocor [Simvastatin]    • Zyrtec [Cetirizine]           OB/GYN history: Patient started menstrual period at age 14.  She had menopause at age 55.  She has not had any children and she has not had any  "miscarriages.    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  She retired at age 63 in .  She never smoked.  She never did alcohol.  She is .  She now lives with her 96-year-old sister.  She is very active.    Family history: Father  of an accident at age 56.  Mother  of uterine cancer at age 87.  She had a brother who  of lung cancer at age 61.  She has a brother with heart attack at age 46.  She lives with her sister was 96-year-old.  Her father's brother's daughter had breast cancer.  At age 70.  She states that several of her cousins had breast cancer.  But they were all older.       Family History   Problem Relation Age of Onset   • Uterine cancer Mother    • Coronary artery disease Mother    • Lung cancer Brother    • Heart attack Brother    • Heart disease Brother         Review of Systems   Constitutional: Negative for fatigue and unexpected weight change.   Respiratory: Negative for cough, chest tightness, shortness of breath and wheezing.    Cardiovascular: Negative for chest pain, palpitations and leg swelling.   Gastrointestinal: Negative for abdominal pain, blood in stool, diarrhea, nausea and vomiting.   Neurological: Negative for numbness and headaches.        Objective     Vitals:    10/06/17 0915   BP: 118/68   Pulse: 57   Resp: 16   Temp: 97.6 °F (36.4 °C)   TempSrc: Oral   SpO2: 99%   Weight: 96 lb 12.8 oz (43.9 kg)   Height: 58.07\" (147.5 cm)   PainSc: 0-No pain     Current Status 10/6/2017   ECOG score 0       Physical Exam   Pulmonary/Chest:             BREAST EXAM: Left breast: Patient has nipple retraction of the left breast.  No evidence of nipple discharge.  There is a mass in the retroareolar region which is on physical examination 6 cm x 6 cm.  The mass appears to be freely mobile.  Since she is small breasted it appears that the mass is almost " involving most of the breast.  There is bruising at the biopsy site.  I do not appreciate any left axillary adenopathy.  There is no evidence of left supraclavicular adenopathy.    Right breast: There is no evidence of any nipple retraction or nipple discharge of the right breast.  There is no evidence of any mass or skin changes of erythema or thickening in the right breast.  There is no evidence of right axillary adenopathy or right supraclavicular adenopathy.      GENERAL:  Well-developed, well-nourished in no acute distress.   SKIN:  Warm, dry without rashes, purpura or petechiae.  EYES:  Pupils equal, round and reactive to light.  EOMs intact.  Conjunctivae normal.  EARS:  Hearing intact.  NOSE:  Septum midline.  No excoriations or nasal discharge.  MOUTH:  Tongue is well-papillated; no stomatitis or ulcers.  Lips normal.  THROAT:  Oropharynx without lesions or exudates.  NECK:  Supple with good range of motion; no thyromegaly or masses, no JVD.  LYMPHATICS:  No cervical, supraclavicular, axillary or inguinal adenopathy.  CHEST:  Lungs clear to auscultation. Good airflow.  CARDIAC:  Regular rate and rhythm without murmurs, rubs or gallops. Normal S1,S2.  ABDOMEN:  Soft, nontender with no hepatosplenomegaly or masses.  EXTREMITIES:  No clubbing, cyanosis or edema.  NEUROLOGICAL:  Cranial Nerves II-XII grossly intact.  No focal neurological deficits.  PSYCHIATRIC:  Normal affect and mood.        RECENT LABS:  Hematology WBC   Date Value Ref Range Status   10/06/2017 5.51 4.00 - 10.00 10*3/mm3 Final     RBC   Date Value Ref Range Status   10/06/2017 4.12 3.90 - 5.00 10*6/mm3 Final     Hemoglobin   Date Value Ref Range Status   10/06/2017 12.5 11.5 - 14.9 g/dL Final     Hematocrit   Date Value Ref Range Status   10/06/2017 37.7 34.0 - 45.0 % Final     Platelets   Date Value Ref Range Status   10/06/2017 247 150 - 375 10*3/mm3 Final          Assessment/Plan     1.  Left breast mass,T2 N0,radiologically, T3 N0  clinically,  patient noticed a mass in the left breast since a year and had been slowly growing.  She noted as she thought it'll resolve.  Subsequently more recently she underwent a bilateral diagnostic mammogram which showed nipple retraction on the left breast along with a 3.1 cm retroareolar mass with slight skin thickening.  There was no evidence of axillary adenopathy on the left side.  The right breast was normal.  She then underwent an ultrasound which showed a 2.5 cm mass is regular on the left retroareolar region.  It did not show evidence of any left axillary lymph node.  Patient underwent a biopsy of the breast mass by ultrasound guidance.  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  It was intermediate grade.  Springs score is 6.  Invasive carcinoma was present in multiple core biopsy fragments spanning 10 mm.    It was estrogen receptor greater than 90%, progesterone receptor 40%, HER-2/selwyn 2+ by immunohistochemistry and  Is 2.2 with a HER-2/CEP ratio of 1.1 which is negative by fish.  She has not noticed any left nipple discharge.  She has no pain in the left breast.  She does think that it had enlarged over time.  She is now here to discuss further options of treatment.    Except for hypertension and high cholesterol she seems to be in very good health and is still active.  I explained to her and her  niece   that she could still be a surgical candidate.  Explained to them that her breast  carcinoma is a very slow-growing carcinoma and is less aggressive.  Her estrogen and progesterone receptors being positive she certainly still a candidate  for either tamoxifen or aromatase inhibitor.  I explained to them what it means by being estrogen receptor and progesterone receptor positive and HER-2 receptor being negative.  Given that her performance status is reasonable and she does not have too many comorbidities I told her that  surgical option is the best at this time.  Following surgery we can discuss about tamoxifen or aromatase inhibitor therapy.  At her age she could potentially be osteoporotic and would require a DEXA scan.  We can get that done once she is assessed by surgery to see if she surgical candidate.    2.  Mildly elevated calcium at 10.2 with elevated PTH, her repeat calcium is 9.8 today.  But she is being evaluated by endocrine.  Her hypercalcemia seems to be resolved.    3.  Family history of her cousin having had breast cancer at age 70.  No first-degree relative with breast cancer.  However she states several of her cousins have had breast cancer all when they were older.    4.  Severe vitamin D deficiency for which I encouraged her to continue vitamin D 50,000 units by mouth once weekly.    Plan 1.  We will refer patient to Dr. Ponce to see if she surgical candidate.  I did tell the family that the decision for mastectomy versus lumpectomy will be made by Dr. Ponce.    2.  I will bring her back in 4 weeks to see me at which time we can obtain a DEXA scan.    3.  Once surgery is completed we can consider tamoxifen./ Aromatase inhibitor which  may be a concern if she is osteoporotic.   Prolia may be needed for her osteopenia/osteoporosis.    4.  Will check a 25-hydroxy vitamin D levels today.    Milly Penaloza MD

## 2017-10-06 NOTE — TELEPHONE ENCOUNTER
"Pt informed of New Pt appt    Dr. Ponce: 10-18-17 2:30 arrival    Prescreened MRI: Good on all questions 59\" 98 lbs  "

## 2017-10-10 RX ORDER — LANCETS
EACH MISCELLANEOUS
Qty: 100 EACH | Refills: 2 | Status: SHIPPED | OUTPATIENT
Start: 2017-10-10 | End: 2018-02-16

## 2017-10-18 ENCOUNTER — OFFICE VISIT (OUTPATIENT)
Dept: ENDOCRINOLOGY | Age: 82
End: 2017-10-18

## 2017-10-18 ENCOUNTER — OFFICE VISIT (OUTPATIENT)
Dept: SURGERY | Facility: CLINIC | Age: 82
End: 2017-10-18

## 2017-10-18 ENCOUNTER — PREP FOR SURGERY (OUTPATIENT)
Dept: OTHER | Facility: HOSPITAL | Age: 82
End: 2017-10-18

## 2017-10-18 VITALS
HEART RATE: 60 BPM | DIASTOLIC BLOOD PRESSURE: 78 MMHG | BODY MASS INDEX: 20.52 KG/M2 | SYSTOLIC BLOOD PRESSURE: 124 MMHG | WEIGHT: 98.4 LBS | OXYGEN SATURATION: 98 %

## 2017-10-18 VITALS
BODY MASS INDEX: 16.56 KG/M2 | HEART RATE: 59 BPM | SYSTOLIC BLOOD PRESSURE: 131 MMHG | HEIGHT: 64 IN | OXYGEN SATURATION: 98 % | WEIGHT: 97 LBS | DIASTOLIC BLOOD PRESSURE: 67 MMHG

## 2017-10-18 DIAGNOSIS — R63.4 RECENT UNEXPLAINED WEIGHT LOSS: ICD-10-CM

## 2017-10-18 DIAGNOSIS — C50.112 MALIGNANT NEOPLASM OF CENTRAL PORTION OF LEFT BREAST IN FEMALE, ESTROGEN RECEPTOR POSITIVE (HCC): Primary | ICD-10-CM

## 2017-10-18 DIAGNOSIS — Z17.0 MALIGNANT NEOPLASM OF CENTRAL PORTION OF LEFT BREAST IN FEMALE, ESTROGEN RECEPTOR POSITIVE (HCC): Primary | ICD-10-CM

## 2017-10-18 DIAGNOSIS — E11.9 TYPE 2 DIABETES MELLITUS WITHOUT COMPLICATION, WITHOUT LONG-TERM CURRENT USE OF INSULIN (HCC): Primary | ICD-10-CM

## 2017-10-18 DIAGNOSIS — E55.9 VITAMIN D DEFICIENCY: ICD-10-CM

## 2017-10-18 DIAGNOSIS — E83.52 HYPERCALCEMIA: ICD-10-CM

## 2017-10-18 DIAGNOSIS — I10 ESSENTIAL HYPERTENSION: ICD-10-CM

## 2017-10-18 DIAGNOSIS — E78.2 MIXED HYPERLIPIDEMIA: ICD-10-CM

## 2017-10-18 DIAGNOSIS — E21.0 PRIMARY HYPERPARATHYROIDISM (HCC): ICD-10-CM

## 2017-10-18 PROCEDURE — 99205 OFFICE O/P NEW HI 60 MIN: CPT | Performed by: SURGERY

## 2017-10-18 PROCEDURE — 99204 OFFICE O/P NEW MOD 45 MIN: CPT | Performed by: INTERNAL MEDICINE

## 2017-10-18 RX ORDER — CEFAZOLIN SODIUM 2 G/100ML
2 INJECTION, SOLUTION INTRAVENOUS ONCE
Status: CANCELLED | OUTPATIENT
Start: 2017-12-07 | End: 2017-10-18

## 2017-10-18 RX ORDER — SODIUM CHLORIDE, SODIUM LACTATE, POTASSIUM CHLORIDE, CALCIUM CHLORIDE 600; 310; 30; 20 MG/100ML; MG/100ML; MG/100ML; MG/100ML
100 INJECTION, SOLUTION INTRAVENOUS CONTINUOUS
Status: CANCELLED | OUTPATIENT
Start: 2017-12-07

## 2017-10-18 RX ORDER — ERGOCALCIFEROL 1.25 MG/1
50000 CAPSULE ORAL 2 TIMES WEEKLY
Qty: 26 CAPSULE | Refills: 3 | Status: SHIPPED | OUTPATIENT
Start: 2017-10-19 | End: 2018-02-16
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
## 2017-10-18 NOTE — TELEPHONE ENCOUNTER
EMLA 30 g tube   No refills   Apply topically to nipple-areola and 1 cm outside of nipple before leaving home day of surgery then cover.     Lml

## 2017-10-19 ENCOUNTER — TELEPHONE (OUTPATIENT)
Dept: SURGERY | Facility: CLINIC | Age: 82
End: 2017-10-19

## 2017-10-19 ENCOUNTER — PREP FOR SURGERY (OUTPATIENT)
Dept: OTHER | Facility: HOSPITAL | Age: 82
End: 2017-10-19

## 2017-10-19 ENCOUNTER — TRANSCRIBE ORDERS (OUTPATIENT)
Dept: SURGERY | Facility: CLINIC | Age: 82
End: 2017-10-19

## 2017-10-19 DIAGNOSIS — C50.112 CARCINOMA OF CENTRAL PORTION OF FEMALE BREAST, LEFT (HCC): Primary | ICD-10-CM

## 2017-10-19 LAB
25(OH)D3+25(OH)D2 SERPL-MCNC: 27.5 NG/ML (ref 30–100)
ACTH PLAS-MCNC: 16.4 PG/ML (ref 7.2–63.3)
ALBUMIN SERPL-MCNC: 4.5 G/DL (ref 3.5–5.2)
ALBUMIN/GLOB SERPL: 2 G/DL
ALP SERPL-CCNC: 68 U/L (ref 39–117)
ALT SERPL-CCNC: 12 U/L (ref 1–33)
AST SERPL-CCNC: 13 U/L (ref 1–32)
BILIRUB SERPL-MCNC: 0.3 MG/DL (ref 0.1–1.2)
BUN SERPL-MCNC: 9 MG/DL (ref 8–23)
BUN/CREAT SERPL: 13.4 (ref 7–25)
C PEPTIDE SERPL-MCNC: 1.5 NG/ML (ref 1.1–4.4)
CA-I SERPL ISE-MCNC: 5.8 MG/DL (ref 4.5–5.6)
CALCIUM SERPL-MCNC: 10.1 MG/DL (ref 8.2–9.6)
CHLORIDE SERPL-SCNC: 101 MMOL/L (ref 98–107)
CHOLEST SERPL-MCNC: 148 MG/DL (ref 0–200)
CO2 SERPL-SCNC: 27.4 MMOL/L (ref 22–29)
CORTIS SERPL-MCNC: 7.4 UG/DL
CREAT SERPL-MCNC: 0.67 MG/DL (ref 0.57–1)
GFR SERPLBLD CREATININE-BSD FMLA CKD-EPI: 100 ML/MIN/1.73
GFR SERPLBLD CREATININE-BSD FMLA CKD-EPI: 83 ML/MIN/1.73
GLOBULIN SER CALC-MCNC: 2.2 GM/DL
GLUCOSE SERPL-MCNC: 96 MG/DL (ref 65–99)
HBA1C MFR BLD: 5.2 % (ref 4.8–5.6)
HDLC SERPL-MCNC: 77 MG/DL (ref 40–60)
LDLC SERPL CALC-MCNC: 53 MG/DL (ref 0–100)
MAGNESIUM SERPL-MCNC: 2.1 MG/DL (ref 1.6–2.4)
PHOSPHATE SERPL-MCNC: 3.7 MG/DL (ref 2.5–4.5)
POTASSIUM SERPL-SCNC: 4.3 MMOL/L (ref 3.5–5.2)
PROT SERPL-MCNC: 6.7 G/DL (ref 6–8.5)
PTH-INTACT SERPL-MCNC: 62 PG/ML (ref 15–65)
SODIUM SERPL-SCNC: 140 MMOL/L (ref 136–145)
T3FREE SERPL-MCNC: 2.5 PG/ML (ref 2–4.4)
T4 FREE SERPL-MCNC: 1 NG/DL (ref 0.93–1.7)
T4 SERPL-MCNC: 6.72 MCG/DL (ref 4.5–11.7)
TRIGL SERPL-MCNC: 92 MG/DL (ref 0–150)
TSH SERPL DL<=0.005 MIU/L-ACNC: 2.98 MIU/ML (ref 0.27–4.2)
URATE SERPL-MCNC: 2.3 MG/DL (ref 2.4–5.7)
VLDLC SERPL CALC-MCNC: 18.4 MG/DL (ref 5–40)

## 2017-10-19 RX ORDER — LIDOCAINE AND PRILOCAINE 25; 25 MG/G; MG/G
CREAM TOPICAL ONCE
Qty: 30 G | Refills: 0 | Status: SHIPPED | OUTPATIENT
Start: 2017-10-19 | End: 2017-10-19

## 2017-10-19 NOTE — TELEPHONE ENCOUNTER
Pt informed of appts:  [facility] 10-24-17 11:00 arrival Bone Scan and CT's  Liquids only 4 hours prior

## 2017-10-23 ENCOUNTER — TELEPHONE (OUTPATIENT)
Dept: SURGERY | Facility: CLINIC | Age: 82
End: 2017-10-23

## 2017-10-23 ENCOUNTER — TELEPHONE (OUTPATIENT)
Dept: ONCOLOGY | Facility: HOSPITAL | Age: 82
End: 2017-10-23

## 2017-10-23 PROBLEM — C50.112 MALIGNANT NEOPLASM OF CENTRAL PORTION OF LEFT BREAST IN FEMALE, ESTROGEN RECEPTOR POSITIVE (HCC): Status: ACTIVE | Noted: 2017-10-23

## 2017-10-23 PROBLEM — Z17.0 MALIGNANT NEOPLASM OF CENTRAL PORTION OF LEFT BREAST IN FEMALE, ESTROGEN RECEPTOR POSITIVE (HCC): Status: ACTIVE | Noted: 2017-10-23

## 2017-10-23 NOTE — TELEPHONE ENCOUNTER
[place]uled [place] [facility] [place]  Left Total Mastectomy, Left Axilla [place] Node Biopsy, Possible Node Dissection, NO Reconstruction    Arrive       @ 9;30   SI             @ 11:30  surgery    @  12:30 (this is all the OR had)    PAT 11-30-17 @ 12:00  Return to see  1-2-18 arrive 11:15    Jennifer Medina to assist      kdl    Pt's irene Medina is aware of all appointments  sangeethal

## 2017-10-23 NOTE — TELEPHONE ENCOUNTER
Pt's niece Carol is calling bc they are confused whether or not pt needs to see Dr. Penaloza on nov 3rd. Carol states they saw Dr. Ponce, she has ordered scans, palak will call them with results and she has scheduled surgery. I told them I did not think it was necessary to see Dr. Penaloza prior to surgery but that I would check with her to be sure. Message sent to Dr. Penaloza. Await response

## 2017-10-24 ENCOUNTER — HOSPITAL ENCOUNTER (OUTPATIENT)
Dept: CT IMAGING | Facility: HOSPITAL | Age: 82
Discharge: HOME | End: 2017-10-24
Attending: SURGERY | Admitting: SURGERY

## 2017-10-24 ENCOUNTER — HOSPITAL ENCOUNTER (OUTPATIENT)
Dept: NUCLEAR MEDICINE | Facility: HOSPITAL | Age: 82
Discharge: HOME | End: 2017-10-24
Attending: SURGERY

## 2017-10-24 DIAGNOSIS — R63.4 RECENT UNEXPLAINED WEIGHT LOSS: ICD-10-CM

## 2017-10-24 DIAGNOSIS — Z17.0 MALIGNANT NEOPLASM OF CENTRAL PORTION OF LEFT BREAST IN FEMALE, ESTROGEN RECEPTOR POSITIVE (HCC): ICD-10-CM

## 2017-10-24 DIAGNOSIS — E83.52 HYPERCALCEMIA: ICD-10-CM

## 2017-10-24 DIAGNOSIS — C50.112 MALIGNANT NEOPLASM OF CENTRAL PORTION OF LEFT BREAST IN FEMALE, ESTROGEN RECEPTOR POSITIVE (HCC): ICD-10-CM

## 2017-10-24 PROCEDURE — A9503 TC99M MEDRONATE: HCPCS | Performed by: SURGERY

## 2017-10-24 PROCEDURE — 74176 CT ABD & PELVIS W/O CONTRAST: CPT

## 2017-10-24 PROCEDURE — 71250 CT THORAX DX C-: CPT

## 2017-10-24 PROCEDURE — 78306 BONE IMAGING WHOLE BODY: CPT

## 2017-10-24 PROCEDURE — 0 TECHNETIUM MEDRONATE KIT: Performed by: SURGERY

## 2017-10-24 RX ORDER — TC 99M MEDRONATE 20 MG/10ML
22 INJECTION, POWDER, LYOPHILIZED, FOR SOLUTION INTRAVENOUS
Status: COMPLETED | OUTPATIENT
Start: 2017-10-24 | End: 2017-10-24

## 2017-10-24 RX ADMIN — Medication 22 MILLICURIE: at 11:11

## 2017-10-25 ENCOUNTER — TELEPHONE (OUTPATIENT)
Dept: ONCOLOGY | Facility: CLINIC | Age: 82
End: 2017-10-25

## 2017-10-25 NOTE — TELEPHONE ENCOUNTER
----- Message from Minerva Pino RN sent at 10/25/2017  9:07 AM EDT -----  Pt needs appt with Dr DE JESUS 2-3 weeks from 12/7 (the date of her mastectomy) per dr DE JESUS.  Please call pt for appt.  Thank you    ----- Message -----     From: Gaby Griffin RN     Sent: 10/25/2017   7:32 AM       [facility]        ----- Message -----     From: Milly Penaloza MD     Sent: 10/23/2017   5:15 PM       To: Gaby Griffin RN    Please tell her that she needs to make an appointment with me 2-3 weeks after she completes her surgery.  Also please let me know when she set up for surgery. Milly Penaloza MD    ----- Message -----     From: Gaby Griffin RN     Sent: 10/23/2017   2:45 PM       To: Milly Penaloza MD    Pt's niece is asking if pt needs to see you in November before she has her mastectomy. She thought she needed to cancel the appt in early nov. Please advise

## 2017-10-26 ENCOUNTER — TELEPHONE (OUTPATIENT)
Dept: SURGERY | Facility: CLINIC | Age: 82
End: 2017-10-26

## 2017-10-26 NOTE — TELEPHONE ENCOUNTER
"Spoke with Rupert Nathan today about her staging CT chest abdomen and pelvis and bone scan.  Her CT was without contrast due to her contrast allergy that she reported as \"\" severe.  Findings on the CT included multiple bilateral subcentimeter pulmonary nodules which require follow-up evaluation.  Dr. Qureshi could not comment on whether these were definitively metastatic or benign and recommended a follow-up imaging.  He stated that they were too small for PET to clarify and not biopsy bed.  In addition to subcentimeter low-density lesions in the medial segment of left lobe of the liver are indeterminate and also require imaging follow-up.  A 5 mm sclerotic foci in the central left manubrium which could be followed on subsequent exams as well.  Both can do the same day October 24, 2017 showed no evidence for osseous metastatic disease.  "

## 2017-10-30 ENCOUNTER — TELEPHONE (OUTPATIENT)
Dept: SURGERY | Facility: CLINIC | Age: 82
End: 2017-10-30

## 2017-10-30 NOTE — TELEPHONE ENCOUNTER
I let Carol know there are a few findings on her CT  Imaging,   but that these are not necessarily related to her cancer and we will follow these with subsequent imaging.    Her bone scan is normal.  No obvious reason for her weight loss was seen.    I let Carol, Ms. Flaquita niece know. Castrol

## 2017-11-02 RX ORDER — LISINOPRIL 40 MG/1
TABLET ORAL
Qty: 30 TABLET | Refills: 2 | Status: SHIPPED | OUTPATIENT
Start: 2017-11-02 | End: 2017-11-30

## 2017-11-03 ENCOUNTER — APPOINTMENT (OUTPATIENT)
Dept: LAB | Facility: HOSPITAL | Age: 82
End: 2017-11-03

## 2017-11-03 ENCOUNTER — APPOINTMENT (OUTPATIENT)
Dept: ONCOLOGY | Facility: CLINIC | Age: 82
End: 2017-11-03

## 2017-11-30 ENCOUNTER — APPOINTMENT (OUTPATIENT)
Dept: PREADMISSION TESTING | Facility: HOSPITAL | Age: 82
End: 2017-11-30

## 2017-11-30 VITALS
OXYGEN SATURATION: 99 % | SYSTOLIC BLOOD PRESSURE: 129 MMHG | WEIGHT: 99.44 LBS | DIASTOLIC BLOOD PRESSURE: 67 MMHG | HEIGHT: 58 IN | TEMPERATURE: 97.4 F | RESPIRATION RATE: 18 BRPM | BODY MASS INDEX: 20.88 KG/M2 | HEART RATE: 61 BPM

## 2017-11-30 LAB
ANION GAP SERPL CALCULATED.3IONS-SCNC: 10.2 MMOL/L
BUN BLD-MCNC: 8 MG/DL (ref 8–23)
BUN/CREAT SERPL: 13.8 (ref 7–25)
CALCIUM SPEC-SCNC: 9.8 MG/DL (ref 8.2–9.6)
CHLORIDE SERPL-SCNC: 98 MMOL/L (ref 98–107)
CO2 SERPL-SCNC: 27.8 MMOL/L (ref 22–29)
CREAT BLD-MCNC: 0.58 MG/DL (ref 0.57–1)
DEPRECATED RDW RBC AUTO: 45.3 FL (ref 37–54)
ERYTHROCYTE [DISTWIDTH] IN BLOOD BY AUTOMATED COUNT: 13.2 % (ref 11.7–13)
GFR SERPL CREATININE-BSD FRML MDRD: 98 ML/MIN/1.73
GLUCOSE BLD-MCNC: 76 MG/DL (ref 65–99)
HCT VFR BLD AUTO: 34.7 % (ref 35.6–45.5)
HGB BLD-MCNC: 11.2 G/DL (ref 11.9–15.5)
MCH RBC QN AUTO: 30.5 PG (ref 26.9–32)
MCHC RBC AUTO-ENTMCNC: 32.3 G/DL (ref 32.4–36.3)
MCV RBC AUTO: 94.6 FL (ref 80.5–98.2)
PLATELET # BLD AUTO: 322 10*3/MM3 (ref 140–500)
PMV BLD AUTO: 10.5 FL (ref 6–12)
POTASSIUM BLD-SCNC: 4.5 MMOL/L (ref 3.5–5.2)
RBC # BLD AUTO: 3.67 10*6/MM3 (ref 3.9–5.2)
SODIUM BLD-SCNC: 136 MMOL/L (ref 136–145)
WBC NRBC COR # BLD: 5.53 10*3/MM3 (ref 4.5–10.7)

## 2017-11-30 PROCEDURE — 93005 ELECTROCARDIOGRAM TRACING: CPT

## 2017-11-30 PROCEDURE — 36415 COLL VENOUS BLD VENIPUNCTURE: CPT

## 2017-11-30 PROCEDURE — 93010 ELECTROCARDIOGRAM REPORT: CPT | Performed by: INTERNAL MEDICINE

## 2017-11-30 PROCEDURE — 80048 BASIC METABOLIC PNL TOTAL CA: CPT | Performed by: SURGERY

## 2017-11-30 PROCEDURE — 85027 COMPLETE CBC AUTOMATED: CPT | Performed by: SURGERY

## 2017-11-30 RX ORDER — ESOMEPRAZOLE MAGNESIUM 40 MG/1
40 CAPSULE, DELAYED RELEASE ORAL
COMMUNITY
End: 2019-01-01 | Stop reason: SDUPTHER

## 2017-11-30 RX ORDER — ROSUVASTATIN CALCIUM 5 MG/1
5 TABLET, COATED ORAL DAILY
COMMUNITY
End: 2018-06-25 | Stop reason: SDUPTHER

## 2017-11-30 RX ORDER — LISINOPRIL 40 MG/1
40 TABLET ORAL DAILY
COMMUNITY
End: 2018-03-07 | Stop reason: SDUPTHER

## 2017-12-01 ENCOUNTER — TELEPHONE (OUTPATIENT)
Dept: SURGERY | Facility: CLINIC | Age: 82
End: 2017-12-01

## 2017-12-04 ENCOUNTER — TELEPHONE (OUTPATIENT)
Dept: SURGERY | Facility: CLINIC | Age: 82
End: 2017-12-04

## 2017-12-04 NOTE — TELEPHONE ENCOUNTER
Message to Alena Iglesias's office   Missing follow-up following labs and 10/18/17 office visit with Dr. Iglesias.   Need to confirm okay to proceed with surgery and get this follow-up.   Left mastectomy and Left SLNB scheduled this week- Thursday 12/7/17.     lml

## 2017-12-05 ENCOUNTER — TELEPHONE (OUTPATIENT)
Dept: SURGERY | Facility: CLINIC | Age: 82
End: 2017-12-05

## 2017-12-05 ENCOUNTER — TELEPHONE (OUTPATIENT)
Dept: ENDOCRINOLOGY | Age: 82
End: 2017-12-05

## 2017-12-05 NOTE — TELEPHONE ENCOUNTER
----- Message from Merle Ilgesias MD sent at 12/5/2017  8:55 AM EST -----  The laboratory evaluation showed elevated levels of ionized calcium and PTH and low levels of vitamin D.  I feel with vitamin D replacement therapy this abnormality also will go away.  I also saw a CBC done by Dr Penaloza the hematologist however I am sure she has addressed that.  ----- Message -----     From: Alena Caldera MA     Sent: 12/4/2017   2:22 PM       To: Merle Iglesias MD    Patient is scheduled to have a left mastectomy this Thursday with Dr. Ponce. She was seen in our office 10/2017 and Dr. Ponce would like to make sure that based on those labs that the patient is clear for surgery.    Leticia- [phone]    Spoke to Dr. Iglesias and did clarify that patient is cleared to have surgery with Dr. Pocne this week.    Called Leticia and left message in regards to Dr. Iglesias clearing patient for surgery.

## 2017-12-05 NOTE — TELEPHONE ENCOUNTER
Commmunication with Dr Iglesias office- labs showed elevated Ca inonized and PTH, and lo vit D- plan for Vid D replacement and thinks that the other values will normalize.

## 2017-12-07 ENCOUNTER — HOSPITAL ENCOUNTER (OUTPATIENT)
Facility: HOSPITAL | Age: 82
Discharge: HOME | End: 2017-12-08
Attending: SURGERY | Admitting: SURGERY

## 2017-12-07 ENCOUNTER — ANESTHESIA (OUTPATIENT)
Dept: PERIOP | Facility: HOSPITAL | Age: 82
End: 2017-12-07

## 2017-12-07 ENCOUNTER — HOSPITAL ENCOUNTER (OUTPATIENT)
Dept: NUCLEAR MEDICINE | Facility: HOSPITAL | Age: 82
Discharge: HOME | End: 2017-12-07
Attending: SURGERY

## 2017-12-07 ENCOUNTER — ANESTHESIA EVENT (OUTPATIENT)
Dept: PERIOP | Facility: HOSPITAL | Age: 82
End: 2017-12-07

## 2017-12-07 DIAGNOSIS — C50.112 CARCINOMA OF CENTRAL PORTION OF FEMALE BREAST, LEFT (HCC): ICD-10-CM

## 2017-12-07 DIAGNOSIS — C50.112 MALIGNANT NEOPLASM OF CENTRAL PORTION OF LEFT BREAST IN FEMALE, ESTROGEN RECEPTOR POSITIVE (HCC): ICD-10-CM

## 2017-12-07 DIAGNOSIS — Z17.0 MALIGNANT NEOPLASM OF CENTRAL PORTION OF LEFT BREAST IN FEMALE, ESTROGEN RECEPTOR POSITIVE (HCC): ICD-10-CM

## 2017-12-07 LAB
GLUCOSE BLDC GLUCOMTR-MCNC: 134 MG/DL (ref 70–130)
GLUCOSE BLDC GLUCOMTR-MCNC: 93 MG/DL (ref 70–130)

## 2017-12-07 PROCEDURE — 25010000002 FENTANYL CITRATE (PF) 100 MCG/2ML SOLUTION: Performed by: NURSE ANESTHETIST, CERTIFIED REGISTERED

## 2017-12-07 PROCEDURE — 78195 LYMPH SYSTEM IMAGING: CPT | Performed by: SURGERY

## 2017-12-07 PROCEDURE — 25010000002 FENTANYL CITRATE (PF) 100 MCG/2ML SOLUTION

## 2017-12-07 PROCEDURE — 88307 TISSUE EXAM BY PATHOLOGIST: CPT | Performed by: SURGERY

## 2017-12-07 PROCEDURE — 38792 RA TRACER ID OF SENTINL NODE: CPT

## 2017-12-07 PROCEDURE — 25010000002 EPINEPHRINE PER 0.1 MG: Performed by: SURGERY

## 2017-12-07 PROCEDURE — 25010000002 NEOSTIGMINE PER 0.5 MG: Performed by: NURSE ANESTHETIST, CERTIFIED REGISTERED

## 2017-12-07 PROCEDURE — 25010000002 ONDANSETRON PER 1 MG: Performed by: SURGERY

## 2017-12-07 PROCEDURE — G0378 HOSPITAL OBSERVATION PER HR: HCPCS

## 2017-12-07 PROCEDURE — 25010000002 PROPOFOL 10 MG/ML EMULSION: Performed by: ANESTHESIOLOGY

## 2017-12-07 PROCEDURE — 25010000002 HYDROMORPHONE PER 4 MG: Performed by: NURSE ANESTHETIST, CERTIFIED REGISTERED

## 2017-12-07 PROCEDURE — 88331 PATH CONSLTJ SURG 1 BLK 1SPC: CPT | Performed by: SURGERY

## 2017-12-07 PROCEDURE — 25010000003 CEFAZOLIN IN DEXTROSE 2-4 GM/100ML-% SOLUTION: Performed by: SURGERY

## 2017-12-07 PROCEDURE — 82962 GLUCOSE BLOOD TEST: CPT

## 2017-12-07 PROCEDURE — 94799 UNLISTED PULMONARY SVC/PX: CPT

## 2017-12-07 PROCEDURE — 38525 BIOPSY/REMOVAL LYMPH NODES: CPT | Performed by: SURGERY

## 2017-12-07 PROCEDURE — 0 TECHNETIUM FILTERED SULFUR COLLOID: Performed by: SURGERY

## 2017-12-07 PROCEDURE — A9541 TC99M SULFUR COLLOID: HCPCS | Performed by: SURGERY

## 2017-12-07 PROCEDURE — 25010000002 ONDANSETRON PER 1 MG: Performed by: NURSE ANESTHETIST, CERTIFIED REGISTERED

## 2017-12-07 PROCEDURE — 19303 MAST SIMPLE COMPLETE: CPT | Performed by: SURGERY

## 2017-12-07 RX ORDER — HYDROCODONE BITARTRATE AND ACETAMINOPHEN 7.5; 325 MG/1; MG/1
1 TABLET ORAL ONCE AS NEEDED
Status: DISCONTINUED | OUTPATIENT
Start: 2017-12-07 | End: 2017-12-07 | Stop reason: HOSPADM

## 2017-12-07 RX ORDER — PROPOFOL 10 MG/ML
VIAL (ML) INTRAVENOUS AS NEEDED
Status: DISCONTINUED | OUTPATIENT
Start: 2017-12-07 | End: 2017-12-07 | Stop reason: SURG

## 2017-12-07 RX ORDER — AMLODIPINE BESYLATE 5 MG/1
5 TABLET ORAL DAILY
Status: DISCONTINUED | OUTPATIENT
Start: 2017-12-07 | End: 2017-12-08 | Stop reason: HOSPADM

## 2017-12-07 RX ORDER — NALOXONE HCL 0.4 MG/ML
0.2 VIAL (ML) INJECTION AS NEEDED
Status: DISCONTINUED | OUTPATIENT
Start: 2017-12-07 | End: 2017-12-07 | Stop reason: HOSPADM

## 2017-12-07 RX ORDER — ONDANSETRON 2 MG/ML
4 INJECTION INTRAMUSCULAR; INTRAVENOUS EVERY 6 HOURS PRN
Status: DISCONTINUED | OUTPATIENT
Start: 2017-12-07 | End: 2017-12-08 | Stop reason: HOSPADM

## 2017-12-07 RX ORDER — PROMETHAZINE HYDROCHLORIDE 25 MG/1
12.5 TABLET ORAL ONCE AS NEEDED
Status: DISCONTINUED | OUTPATIENT
Start: 2017-12-07 | End: 2017-12-07 | Stop reason: HOSPADM

## 2017-12-07 RX ORDER — ONDANSETRON 2 MG/ML
INJECTION INTRAMUSCULAR; INTRAVENOUS AS NEEDED
Status: DISCONTINUED | OUTPATIENT
Start: 2017-12-07 | End: 2017-12-07 | Stop reason: SURG

## 2017-12-07 RX ORDER — OXYCODONE AND ACETAMINOPHEN 7.5; 325 MG/1; MG/1
1 TABLET ORAL ONCE AS NEEDED
Status: DISCONTINUED | OUTPATIENT
Start: 2017-12-07 | End: 2017-12-07 | Stop reason: HOSPADM

## 2017-12-07 RX ORDER — METOCLOPRAMIDE HYDROCHLORIDE 5 MG/ML
10 INJECTION INTRAMUSCULAR; INTRAVENOUS EVERY 6 HOURS
Status: DISCONTINUED | OUTPATIENT
Start: 2017-12-07 | End: 2017-12-08

## 2017-12-07 RX ORDER — CEFAZOLIN SODIUM 2 G/100ML
2 INJECTION, SOLUTION INTRAVENOUS ONCE
Status: COMPLETED | OUTPATIENT
Start: 2017-12-07 | End: 2017-12-07

## 2017-12-07 RX ORDER — FENTANYL CITRATE 50 UG/ML
INJECTION, SOLUTION INTRAMUSCULAR; INTRAVENOUS AS NEEDED
Status: DISCONTINUED | OUTPATIENT
Start: 2017-12-07 | End: 2017-12-07 | Stop reason: SURG

## 2017-12-07 RX ORDER — FENTANYL CITRATE 50 UG/ML
50 INJECTION, SOLUTION INTRAMUSCULAR; INTRAVENOUS
Status: DISCONTINUED | OUTPATIENT
Start: 2017-12-07 | End: 2017-12-07 | Stop reason: HOSPADM

## 2017-12-07 RX ORDER — DIPHENHYDRAMINE HCL 25 MG
25 CAPSULE ORAL EVERY 6 HOURS PRN
Status: DISCONTINUED | OUTPATIENT
Start: 2017-12-07 | End: 2017-12-08 | Stop reason: HOSPADM

## 2017-12-07 RX ORDER — EPHEDRINE SULFATE 50 MG/ML
INJECTION, SOLUTION INTRAVENOUS AS NEEDED
Status: DISCONTINUED | OUTPATIENT
Start: 2017-12-07 | End: 2017-12-07 | Stop reason: SURG

## 2017-12-07 RX ORDER — ONDANSETRON 4 MG/1
4 TABLET, ORALLY DISINTEGRATING ORAL EVERY 6 HOURS PRN
Status: DISCONTINUED | OUTPATIENT
Start: 2017-12-07 | End: 2017-12-08 | Stop reason: HOSPADM

## 2017-12-07 RX ORDER — ROCURONIUM BROMIDE 10 MG/ML
INJECTION, SOLUTION INTRAVENOUS AS NEEDED
Status: DISCONTINUED | OUTPATIENT
Start: 2017-12-07 | End: 2017-12-07 | Stop reason: SURG

## 2017-12-07 RX ORDER — LISINOPRIL 40 MG/1
40 TABLET ORAL DAILY
Status: DISCONTINUED | OUTPATIENT
Start: 2017-12-07 | End: 2017-12-08 | Stop reason: HOSPADM

## 2017-12-07 RX ORDER — PROMETHAZINE HYDROCHLORIDE 25 MG/ML
12.5 INJECTION, SOLUTION INTRAMUSCULAR; INTRAVENOUS ONCE AS NEEDED
Status: DISCONTINUED | OUTPATIENT
Start: 2017-12-07 | End: 2017-12-07 | Stop reason: HOSPADM

## 2017-12-07 RX ORDER — LIDOCAINE HYDROCHLORIDE 20 MG/ML
INJECTION, SOLUTION INFILTRATION; PERINEURAL AS NEEDED
Status: DISCONTINUED | OUTPATIENT
Start: 2017-12-07 | End: 2017-12-07 | Stop reason: SURG

## 2017-12-07 RX ORDER — PROMETHAZINE HYDROCHLORIDE 25 MG/1
25 TABLET ORAL ONCE AS NEEDED
Status: DISCONTINUED | OUTPATIENT
Start: 2017-12-07 | End: 2017-12-07 | Stop reason: HOSPADM

## 2017-12-07 RX ORDER — PROMETHAZINE HYDROCHLORIDE 25 MG/1
25 SUPPOSITORY RECTAL ONCE AS NEEDED
Status: DISCONTINUED | OUTPATIENT
Start: 2017-12-07 | End: 2017-12-07 | Stop reason: HOSPADM

## 2017-12-07 RX ORDER — ACETAMINOPHEN 325 MG/1
650 TABLET ORAL EVERY 4 HOURS PRN
Status: DISCONTINUED | OUTPATIENT
Start: 2017-12-07 | End: 2017-12-08 | Stop reason: HOSPADM

## 2017-12-07 RX ORDER — SODIUM CHLORIDE, SODIUM LACTATE, POTASSIUM CHLORIDE, CALCIUM CHLORIDE 600; 310; 30; 20 MG/100ML; MG/100ML; MG/100ML; MG/100ML
9 INJECTION, SOLUTION INTRAVENOUS CONTINUOUS
Status: DISCONTINUED | OUTPATIENT
Start: 2017-12-07 | End: 2017-12-07

## 2017-12-07 RX ORDER — ONDANSETRON 4 MG/1
4 TABLET, FILM COATED ORAL EVERY 6 HOURS PRN
Status: DISCONTINUED | OUTPATIENT
Start: 2017-12-07 | End: 2017-12-08 | Stop reason: HOSPADM

## 2017-12-07 RX ORDER — NALOXONE HCL 0.4 MG/ML
0.1 VIAL (ML) INJECTION
Status: DISCONTINUED | OUTPATIENT
Start: 2017-12-07 | End: 2017-12-08 | Stop reason: HOSPADM

## 2017-12-07 RX ORDER — SODIUM CHLORIDE 0.9 % (FLUSH) 0.9 %
1-10 SYRINGE (ML) INJECTION AS NEEDED
Status: DISCONTINUED | OUTPATIENT
Start: 2017-12-07 | End: 2017-12-07 | Stop reason: HOSPADM

## 2017-12-07 RX ORDER — SODIUM CHLORIDE, SODIUM LACTATE, POTASSIUM CHLORIDE, CALCIUM CHLORIDE 600; 310; 30; 20 MG/100ML; MG/100ML; MG/100ML; MG/100ML
100 INJECTION, SOLUTION INTRAVENOUS CONTINUOUS
Status: DISCONTINUED | OUTPATIENT
Start: 2017-12-07 | End: 2017-12-07

## 2017-12-07 RX ORDER — DEXTROSE, SODIUM CHLORIDE, AND POTASSIUM CHLORIDE 5; .45; .15 G/100ML; G/100ML; G/100ML
75 INJECTION INTRAVENOUS CONTINUOUS
Status: DISCONTINUED | OUTPATIENT
Start: 2017-12-07 | End: 2017-12-08 | Stop reason: HOSPADM

## 2017-12-07 RX ORDER — NALOXONE HYDROCHLORIDE 0.4 MG/ML
0.08 INJECTION, SOLUTION INTRAMUSCULAR; INTRAVENOUS; SUBCUTANEOUS ONCE
Status: DISCONTINUED | OUTPATIENT
Start: 2017-12-07 | End: 2017-12-08 | Stop reason: HOSPADM

## 2017-12-07 RX ORDER — GLYCOPYRROLATE 0.2 MG/ML
INJECTION INTRAMUSCULAR; INTRAVENOUS AS NEEDED
Status: DISCONTINUED | OUTPATIENT
Start: 2017-12-07 | End: 2017-12-07 | Stop reason: SURG

## 2017-12-07 RX ORDER — CEFAZOLIN SODIUM 2 G/100ML
2 INJECTION, SOLUTION INTRAVENOUS EVERY 8 HOURS
Status: COMPLETED | OUTPATIENT
Start: 2017-12-07 | End: 2017-12-08

## 2017-12-07 RX ORDER — MIDAZOLAM HYDROCHLORIDE 1 MG/ML
1 INJECTION INTRAMUSCULAR; INTRAVENOUS
Status: DISCONTINUED | OUTPATIENT
Start: 2017-12-07 | End: 2017-12-07 | Stop reason: HOSPADM

## 2017-12-07 RX ORDER — HYDROCODONE BITARTRATE AND ACETAMINOPHEN 5; 325 MG/1; MG/1
1 TABLET ORAL EVERY 6 HOURS PRN
Status: DISCONTINUED | OUTPATIENT
Start: 2017-12-07 | End: 2017-12-08 | Stop reason: HOSPADM

## 2017-12-07 RX ORDER — FENTANYL CITRATE 50 UG/ML
INJECTION, SOLUTION INTRAMUSCULAR; INTRAVENOUS
Status: COMPLETED
Start: 2017-12-07 | End: 2017-12-07

## 2017-12-07 RX ORDER — LABETALOL HYDROCHLORIDE 5 MG/ML
5 INJECTION, SOLUTION INTRAVENOUS
Status: DISCONTINUED | OUTPATIENT
Start: 2017-12-07 | End: 2017-12-07 | Stop reason: HOSPADM

## 2017-12-07 RX ORDER — ONDANSETRON 2 MG/ML
4 INJECTION INTRAMUSCULAR; INTRAVENOUS ONCE AS NEEDED
Status: DISCONTINUED | OUTPATIENT
Start: 2017-12-07 | End: 2017-12-07 | Stop reason: HOSPADM

## 2017-12-07 RX ORDER — HYDROMORPHONE HYDROCHLORIDE 1 MG/ML
0.5 INJECTION, SOLUTION INTRAMUSCULAR; INTRAVENOUS; SUBCUTANEOUS
Status: DISCONTINUED | OUTPATIENT
Start: 2017-12-07 | End: 2017-12-08 | Stop reason: HOSPADM

## 2017-12-07 RX ORDER — PANTOPRAZOLE SODIUM 40 MG/1
40 TABLET, DELAYED RELEASE ORAL EVERY MORNING
Status: DISCONTINUED | OUTPATIENT
Start: 2017-12-08 | End: 2017-12-08 | Stop reason: HOSPADM

## 2017-12-07 RX ORDER — DIPHENHYDRAMINE HYDROCHLORIDE 50 MG/ML
12.5 INJECTION INTRAMUSCULAR; INTRAVENOUS
Status: DISCONTINUED | OUTPATIENT
Start: 2017-12-07 | End: 2017-12-07 | Stop reason: HOSPADM

## 2017-12-07 RX ORDER — MIDAZOLAM HYDROCHLORIDE 1 MG/ML
2 INJECTION INTRAMUSCULAR; INTRAVENOUS
Status: DISCONTINUED | OUTPATIENT
Start: 2017-12-07 | End: 2017-12-07 | Stop reason: HOSPADM

## 2017-12-07 RX ORDER — FAMOTIDINE 10 MG/ML
20 INJECTION, SOLUTION INTRAVENOUS ONCE
Status: COMPLETED | OUTPATIENT
Start: 2017-12-07 | End: 2017-12-07

## 2017-12-07 RX ORDER — HYDRALAZINE HYDROCHLORIDE 20 MG/ML
5 INJECTION INTRAMUSCULAR; INTRAVENOUS
Status: DISCONTINUED | OUTPATIENT
Start: 2017-12-07 | End: 2017-12-07 | Stop reason: HOSPADM

## 2017-12-07 RX ORDER — CALCIUM POLYCARBOPHIL 625 MG 625 MG/1
1250 TABLET ORAL DAILY
Status: DISCONTINUED | OUTPATIENT
Start: 2017-12-07 | End: 2017-12-08 | Stop reason: HOSPADM

## 2017-12-07 RX ORDER — SCOLOPAMINE TRANSDERMAL SYSTEM 1 MG/1
1 PATCH, EXTENDED RELEASE TRANSDERMAL
Status: DISCONTINUED | OUTPATIENT
Start: 2017-12-07 | End: 2017-12-07 | Stop reason: HOSPADM

## 2017-12-07 RX ADMIN — CEFAZOLIN SODIUM 2 G: 2 INJECTION, SOLUTION INTRAVENOUS at 19:52

## 2017-12-07 RX ADMIN — EPHEDRINE SULFATE 10 MG: 50 INJECTION INTRAMUSCULAR; INTRAVENOUS; SUBCUTANEOUS at 12:45

## 2017-12-07 RX ADMIN — FENTANYL CITRATE 50 MCG: 50 INJECTION INTRAMUSCULAR; INTRAVENOUS at 13:28

## 2017-12-07 RX ADMIN — FENTANYL CITRATE 50 MCG: 50 INJECTION INTRAMUSCULAR; INTRAVENOUS at 13:35

## 2017-12-07 RX ADMIN — HYDROMORPHONE HYDROCHLORIDE 0.5 MG: 2 INJECTION INTRAMUSCULAR; INTRAVENOUS; SUBCUTANEOUS at 14:37

## 2017-12-07 RX ADMIN — GLYCOPYRROLATE 0.4 MG: 0.2 INJECTION INTRAMUSCULAR; INTRAVENOUS at 13:33

## 2017-12-07 RX ADMIN — ROCURONIUM BROMIDE 25 MG: 10 INJECTION INTRAVENOUS at 11:57

## 2017-12-07 RX ADMIN — LIDOCAINE HYDROCHLORIDE 25 MG: 20 INJECTION, SOLUTION INFILTRATION; PERINEURAL at 11:57

## 2017-12-07 RX ADMIN — FAMOTIDINE 20 MG: 10 INJECTION, SOLUTION INTRAVENOUS at 10:58

## 2017-12-07 RX ADMIN — FENTANYL CITRATE 50 MCG: 50 INJECTION INTRAMUSCULAR; INTRAVENOUS at 13:33

## 2017-12-07 RX ADMIN — ONDANSETRON 4 MG: 2 INJECTION INTRAMUSCULAR; INTRAVENOUS at 13:33

## 2017-12-07 RX ADMIN — FENTANYL CITRATE 50 MCG: 50 INJECTION INTRAMUSCULAR; INTRAVENOUS at 14:13

## 2017-12-07 RX ADMIN — FENTANYL CITRATE 50 MCG: 50 INJECTION INTRAMUSCULAR; INTRAVENOUS at 14:24

## 2017-12-07 RX ADMIN — SODIUM CHLORIDE, POTASSIUM CHLORIDE, SODIUM LACTATE AND CALCIUM CHLORIDE 9 ML/HR: 600; 310; 30; 20 INJECTION, SOLUTION INTRAVENOUS at 10:05

## 2017-12-07 RX ADMIN — TECHNETIUM TC 99M SULFUR COLLOID 1 DOSE: KIT at 10:45

## 2017-12-07 RX ADMIN — FENTANYL CITRATE 50 MCG: 50 INJECTION INTRAMUSCULAR; INTRAVENOUS at 12:29

## 2017-12-07 RX ADMIN — SODIUM CHLORIDE, POTASSIUM CHLORIDE, SODIUM LACTATE AND CALCIUM CHLORIDE 100 ML/HR: 600; 310; 30; 20 INJECTION, SOLUTION INTRAVENOUS at 10:58

## 2017-12-07 RX ADMIN — CALCIUM POLYCARBOPHIL 1250 MG: 625 TABLET, FILM COATED ORAL at 21:12

## 2017-12-07 RX ADMIN — CEFAZOLIN SODIUM 2 G: 2 INJECTION, SOLUTION INTRAVENOUS at 11:55

## 2017-12-07 RX ADMIN — ONDANSETRON 4 MG: 2 INJECTION INTRAMUSCULAR; INTRAVENOUS at 20:22

## 2017-12-07 RX ADMIN — SODIUM CHLORIDE, POTASSIUM CHLORIDE, SODIUM LACTATE AND CALCIUM CHLORIDE: 600; 310; 30; 20 INJECTION, SOLUTION INTRAVENOUS at 13:31

## 2017-12-07 RX ADMIN — POTASSIUM CHLORIDE, DEXTROSE MONOHYDRATE AND SODIUM CHLORIDE 75 ML/HR: 150; 5; 450 INJECTION, SOLUTION INTRAVENOUS at 15:43

## 2017-12-07 RX ADMIN — SCOPALAMINE 1 PATCH: 1 PATCH, EXTENDED RELEASE TRANSDERMAL at 10:59

## 2017-12-07 RX ADMIN — NEOSTIGMINE METHYLSULFATE 4 MG: 1 INJECTION INTRAMUSCULAR; INTRAVENOUS; SUBCUTANEOUS at 13:33

## 2017-12-07 RX ADMIN — FENTANYL CITRATE 50 MCG: 50 INJECTION INTRAMUSCULAR; INTRAVENOUS at 12:26

## 2017-12-07 RX ADMIN — PROPOFOL 70 MG: 10 INJECTION, EMULSION INTRAVENOUS at 11:57

## 2017-12-07 NOTE — PLAN OF CARE
Problem: Perioperative Period (Adult)  Goal: Signs and Symptoms of Listed Potential Problems Will be Absent or Manageable (Perioperative Period)  Outcome: Ongoing (interventions implemented as appropriate)    12/07/17 1450   Perioperative Period   Problems Assessed (Perioperative Period) all   Problems Present (Perioperative Period) none

## 2017-12-07 NOTE — ANESTHESIA PREPROCEDURE EVALUATION
Anesthesia Evaluation     Patient summary reviewed and Nursing notes reviewed   no history of anesthetic complications:  NPO Solid Status: > 6 hours  NPO Liquid Status: > 6 hours     Airway   Mallampati: II  TM distance: >3 FB  Neck ROM: full  no difficulty expected  Dental - normal exam   (+) upper dentures    Pulmonary - negative pulmonary ROS and normal exam    breath sounds clear to auscultation  (-) rhonchi, decreased breath sounds, wheezes, rales, stridor  Cardiovascular - normal exam    ECG reviewed  Rhythm: regular  Rate: normal    (+) hypertension, hyperlipidemia  (-) murmur, weak pulses, friction rub, systolic click, carotid bruits, JVD, peripheral edema      Neuro/Psych- negative ROS  GI/Hepatic/Renal/Endo    (+)  hiatal hernia, GERD, diabetes mellitus type 2 well controlled,     Musculoskeletal (-) negative ROS    Abdominal  - normal exam    Abdomen: soft.   Substance History - negative use     OB/GYN negative ob/gyn ROS         Other      history of cancer active                                    Anesthesia Plan    ASA 3     general     intravenous induction   Anesthetic plan and risks discussed with patient.

## 2017-12-07 NOTE — PLAN OF CARE
Problem: Patient Care Overview (Adult)  Goal: Discharge Needs Assessment  Outcome: Ongoing (interventions implemented as appropriate)    12/07/17 1034   Discharge Needs Assessment   Concerns To Be Addressed denies needs/concerns at this time

## 2017-12-07 NOTE — ANESTHESIA PROCEDURE NOTES
Airway  Urgency: elective      General Information and Staff    Patient location during procedure: OR  Anesthesiologist: KATIE CHINCHILLA    Indications and Patient Condition  Indications for airway management: airway protection    Preoxygenated: yes  MILS maintained throughout  Mask difficulty assessment: 1 - vent by mask    Final Airway Details  Final airway type: endotracheal airway      Successful airway: ETT  Cuffed: yes   Successful intubation technique: direct laryngoscopy  Endotracheal tube insertion site: oral  Blade: Kenia  Blade size: #3  ETT size: 7.0 mm  Cormack-Lehane Classification: grade I - full view of glottis  Placement verified by: chest auscultation   Measured from: lips  ETT to lips (cm): 20  Number of attempts at approach: 1

## 2017-12-07 NOTE — PLAN OF CARE
Problem: Patient Care Overview (Adult)  Goal: Plan of Care Review  Outcome: Ongoing (interventions implemented as appropriate)    12/07/17 1722   Coping/Psychosocial Response Interventions   Plan Of Care Reviewed With patient   Outcome Evaluation   Outcome Summary/Follow up Plan new admission. O2 remains on at 2L, bradycardic, drowsy but arousable, incision site CDI, ELIAN w/ small amt of drain output, SCDs/Teds on. F/C to bedside drain.        Goal: Adult Individualization and Mutuality  Outcome: Ongoing (interventions implemented as appropriate)  Goal: Discharge Needs Assessment  Outcome: Ongoing (interventions implemented as appropriate)    Problem: Perioperative Period (Adult)  Goal: Signs and Symptoms of Listed Potential Problems Will be Absent or Manageable (Perioperative Period)  Outcome: Ongoing (interventions implemented as appropriate)    Problem: Mastectomy (Adult)  Goal: Signs and Symptoms of Listed Potential Problems Will be Absent or Manageable (Mastectomy)  Outcome: Ongoing (interventions implemented as appropriate)

## 2017-12-07 NOTE — PROGRESS NOTES
Case Management/Social Work    Patient Name:  Cristal Sams  YOB: 1927  MRN: [id number]  Admit Date:  12/7/2017    While pt rested quiet I met with two of her nieces, Carol Tran ([phone]) & Lianne Owens ([phone]), they confirmed the address, PCP and pharmacy are correct, they said pt lives alone, has no children and is IADL, they said pt's nephew Alexander Soni ([phone]) is pt's POA.  I spoke by phone briefly with Alexander Soni, he confirmed he is pt's POA, he said he does not think they have given the hospital a copy, he agreed he will bring a copy if he comes to visit, he said this was not a good time to talk due to work and ask that I call another time.     Electronically signed by:  Angélica Yeh RN  12/07/17 4:04 PM

## 2017-12-07 NOTE — PLAN OF CARE
Problem: Patient Care Overview (Adult)  Goal: Plan of Care Review  Outcome: Ongoing (interventions implemented as appropriate)    12/07/17 1450   Coping/Psychosocial Response Interventions   Plan Of Care Reviewed With patient   Patient Care Overview   Progress improving   Outcome Evaluation   Outcome Summary/Follow up Plan stable recovery arana catheter and live drain patent        Goal: Adult Individualization and Mutuality  Outcome: Ongoing (interventions implemented as appropriate)  Goal: Discharge Needs Assessment  Outcome: Ongoing (interventions implemented as appropriate)    12/07/17 1450   Discharge Needs Assessment   Concerns To Be Addressed no discharge needs identified

## 2017-12-07 NOTE — OP NOTE
Operative note    Preoperative Diagnosis: Breast cancer, LEFT    Postoperative Diagnosis: Breast cancer, LEFT    Procedure Performed: left  mastectomy and left axillary sentinel node biopsy with lymphoscintographic guidance. No reconstruction.    Dictating physician and surgeon: Madison Ponce MD    First asst: Jennifer Cho    EBL:3cc    FINDINGS AND DESCRIPTION OF PROCEDURE:       The patient was brought to the operating room and placed on the table in the supine position. After adequate gerneral-ETT anesthesia was obtained, we sterilly prepped and draped the breast and axilla. We did a time out to identify correct patient and correct operative site.  She did receive IV antibiotic within an hour of and prior to incision.       For the mastectomy, I performed the following procedure:  I tumesced the mastectomy flaps using 180 cc of 1:500,000 epinephrine in normal saline.  I tumesced superiorly to the clavicle, inferiorly to the inframammary fold, medially to the sternum and laterally to the anterior axillary line.  I then incised a generous ellipse of skin surrounding the nipple areolar complex using a 10 blade. I then sharply dissected using a 10 blade and a long curved may scissor superiorly to the clavicle, inferiorly to the inframammary fold, medially to the sternum and laterally to the anterior axillary line. I then scored the perimeter of the specimen, the pectoral fascia, circumferentially. I then lifted the pectoral fascia off of the pectoralis major, as the posterior margin of the specimen.     I then labelled the specimen stitch marks 12:00 and passed it from the field.    Next, I turned my attention to the axilla. I used the neoprobe at the start of the case to ensure that the radiotracer had migrated to the axilla, which it had.  I used bovie electrocautery for dissection and the gamma probe for guidance, to remove the lymph nodes demonstrating radiopharmaceutical uptake.     There were 3 sentinel  nodes removed. Counts 1300, 1630, 3500 . All were grossly normal. These were sent for frozen section pathology and were found to be benign.    I then placed a 15 Richy drain and secured this using a 2-0 nylon suture.    I then irrigated, assured hemostasis and closed the skin in 2 layers: the first layer an interrupted 3-0 vicryl suture layer and the second layer a running 4-0 monocryl suture layer.       I then applied lengthwise 1/2 inch steri strips, an island dressing.    Then I wrapped her in 4x4s and a 6 inch ACE wrap.    She tolerated the procedure well, there were no immediate complications, and all counts were correct at the end of the case.

## 2017-12-07 NOTE — PLAN OF CARE
"Problem: Patient Care Overview (Adult)  Goal: Adult Individualization and Mutuality  Outcome: Ongoing (interventions implemented as appropriate)    12/07/17 1034   Individualization   Patient Specific Preferences goes by \"Prakash\"   Patient Specific Interventions hx vertigo so can't lay on left ear or tolerate sudden changes of position           "

## 2017-12-07 NOTE — PLAN OF CARE
Problem: Perioperative Period (Adult)  Goal: Signs and Symptoms of Listed Potential Problems Will be Absent or Manageable (Perioperative Period)  Outcome: Ongoing (interventions implemented as appropriate)    12/07/17 1034   Perioperative Period   Problems Assessed (Perioperative Period) all   Problems Present (Perioperative Period) none

## 2017-12-07 NOTE — ANESTHESIA POSTPROCEDURE EVALUATION
Patient: Cristal Sams    Procedure Summary     Date Anesthesia Start Anesthesia Stop Room / Location    12/07/17 1149 [id number]  [facility] /  [facility]       Procedure Diagnosis Surgeon Provider     left total mastectomy, left axillary sentinel lymph node biopsy x 3  (Left Breast) Malignant neoplasm of central portion of left breast in female, estrogen receptor positive  (Malignant neoplasm of central portion of left breast in female, estrogen receptor positive [C50.112, Z17.0]) MD Alber Rivera MD          Anesthesia Type: general  Last vitals  BP   150/66 (12/07/17 0953)   Temp   36.5 °C (97.7 °F) (12/07/17 0953)   Pulse   58 (12/07/17 0953)   Resp   16 (12/07/17 0953)     SpO2   96 % (12/07/17 0953)     Post Anesthesia Care and Evaluation    Patient location during evaluation: PACU  Patient participation: complete - patient participated  Level of consciousness: awake and alert  Pain management: adequate  Airway patency: patent  Anesthetic complications: No anesthetic complications    Cardiovascular status: acceptable  Respiratory status: acceptable  Hydration status: acceptable    Comments: ---------------------------               12/07/17 0953         ---------------------------   BP:          150/66         Pulse:         58           Resp:          16           Temp:   36.5 °C (97.7 °F)   SpO2:          96%         ---------------------------

## 2017-12-07 NOTE — PLAN OF CARE
Problem: Patient Care Overview (Adult)  Goal: Plan of Care Review  Outcome: Ongoing (interventions implemented as appropriate)    12/07/17 1035   Coping/Psychosocial Response Interventions   Plan Of Care Reviewed With patient;family   Patient Care Overview   Progress improving

## 2017-12-08 ENCOUNTER — TELEPHONE (OUTPATIENT)
Dept: SURGERY | Facility: CLINIC | Age: 82
End: 2017-12-08

## 2017-12-08 VITALS
RESPIRATION RATE: 16 BRPM | HEART RATE: 50 BPM | TEMPERATURE: 96.6 F | SYSTOLIC BLOOD PRESSURE: 132 MMHG | DIASTOLIC BLOOD PRESSURE: 62 MMHG | OXYGEN SATURATION: 97 %

## 2017-12-08 LAB
LAB AP CASE REPORT: NORMAL
Lab: NORMAL
Lab: NORMAL
PATH REPORT.FINAL DX SPEC: NORMAL
PATH REPORT.GROSS SPEC: NORMAL

## 2017-12-08 PROCEDURE — 25010000003 CEFAZOLIN IN DEXTROSE 2-4 GM/100ML-% SOLUTION: Performed by: SURGERY

## 2017-12-08 PROCEDURE — 99024 POSTOP FOLLOW-UP VISIT: CPT | Performed by: SURGERY

## 2017-12-08 PROCEDURE — G0378 HOSPITAL OBSERVATION PER HR: HCPCS

## 2017-12-08 RX ORDER — METOCLOPRAMIDE HYDROCHLORIDE 5 MG/ML
5 INJECTION INTRAMUSCULAR; INTRAVENOUS EVERY 6 HOURS
Status: DISCONTINUED | OUTPATIENT
Start: 2017-12-08 | End: 2017-12-08 | Stop reason: HOSPADM

## 2017-12-08 RX ADMIN — HYDROCODONE BITARTRATE AND ACETAMINOPHEN 1 TABLET: 5; 325 TABLET ORAL at 04:31

## 2017-12-08 RX ADMIN — POTASSIUM CHLORIDE, DEXTROSE MONOHYDRATE AND SODIUM CHLORIDE 75 ML/HR: 150; 5; 450 INJECTION, SOLUTION INTRAVENOUS at 06:19

## 2017-12-08 RX ADMIN — AMLODIPINE BESYLATE 5 MG: 5 TABLET ORAL at 08:18

## 2017-12-08 RX ADMIN — CALCIUM POLYCARBOPHIL 1250 MG: 625 TABLET, FILM COATED ORAL at 08:18

## 2017-12-08 RX ADMIN — CEFAZOLIN SODIUM 2 G: 2 INJECTION, SOLUTION INTRAVENOUS at 04:13

## 2017-12-08 RX ADMIN — PANTOPRAZOLE SODIUM 40 MG: 40 TABLET, DELAYED RELEASE ORAL at 06:05

## 2017-12-08 RX ADMIN — LISINOPRIL 40 MG: 40 TABLET ORAL at 08:18

## 2017-12-08 NOTE — DISCHARGE INSTRUCTIONS
Please give patients the following postoperative WOUND CARE, ACTIVITY and OTHER discharge instructions:    WOUND CARE:  FOR PATIENTS WHO HAVE HAD BREAST SURGERY:  IF..... patient has had reconstruction or oncoplastic closure, wound care will be from the plastic surgeon.  IF PATIENT DOES NOT HAVE PLASTIC SURGEON, keep ace wrap (if present)  in place and dressings dry for 72 hours. May then remove ACE wrap (if present) and dressings and may shower. Leave steri strips on skin and ignore clear suture tails.  Blot dry incision with towel.  No tubs, hot tubs, pools. May wear post op bra or camisole given to you in my office or hospital, after removing ACE.   FOR PATIENTS WHO HAVE HAD PORT PLACED:   Keep dressing  in place and dressings dry for 72 hours.   May then remove dressings and may shower. Leave steri strips on skin and ignore clear suture tails.  Blot dry incision with towel.  No tubs, hot tubs, pools.  May use port immediately, but prefer to keep incision dry for 3 days.  Do not remove steri strips for 7-10 days.  FOR PATIENTS WHO HAVE DRAINS:  Drain and record output every 4 hours or as needed. Strip drain and clean, as per inpatient teaching.    ACTIVITY INSTRUCTIONS:  If patient has had reconstruction, please get all postoperative and discharge activity instructions from plastic surgeon. If patient HAS NOT had reconstruction, then the following instructions will apply for postop and for discharge:  A responsible adult should accompany the patient on discharge and for 24 hours following surgery.  No heavy lifting >5# or strenuous upper arm activity, and no raising arms over head until followup appointment.  No IV or blood pressure cuffs on arm on side of lymph node surgery, if patient has had lymph node surgery. No driving while taking pain or nausea medications, including muscle relaxants.      OTHER:  Responsible adult to stay with patient at discharge and at least 24 hours after discharge.  Call the office  for any of the following: persistent bleeding, excessive bruising or swelling, excessive sleepiness or trouble breathing, severe pain, persistent nausea or vomiting, fever greater than 101.0  Please note: if a plastic surgeon has been involved in the case, please call his or her office. If there is no plastic surgeon involved, please call Dr Ponce's office.  Postop appointment was scheduled in Dr. Ponce's office preoperatively. If patient cannot find that appointment, please call the office for a reminder on the next business day.

## 2017-12-08 NOTE — TELEPHONE ENCOUNTER
Pathology from 12-7-17 LEFT TM and SLNB returned as 2.7 cm IMC, NST, int grade, 3,2,1, associated focal DCIS, int grade, margins negative closest 1mm deep.0/3 nodes, Path stage T2N0- 2A. I called and let her know.      Final Diagnosis   1.  SENTINEL LYMPH NODE #1, LEFT AXILLA, EXCISION:                         ONE LYMPH NODE, NEGATIVE FOR METASTATIC CARCINOMA.      2.  SENTINEL LYMPH NODE #2, LEFT AXILLA, EXCISION:                          ONE LYMPH NODE, NEGATIVE FOR METASTATIC CARCINOMA.       3.  SENTINEL LYMPH NODE #3, LEFT AXILLA, EXCISION:                          ONE LYMPH NODE, NEGATIVE FOR METASTATIC CARCINOMA.      4.  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                         SEE SYNOPTIC REPORT (BELOW) FOR ADDITIONAL DETAILS.     COMMENT: While the majority of the tumor demonstrates histologic features compatible with a mucinous carcinoma, there are some areas that are consistent with a conventional ductal carcinoma.      SYNOPTIC REPORT (Based on [facility] Case [facility], version January 2016):                         Procedure: Total mastectomy (including nipple and skin).                          Lymph node sampling: [place] lymph nodes.                         Specimen laterality: Left.                         Tumor site: Retroareolar and 11-1 o'clock position.                          Tumor size (size of largest invasive carcinoma): 2.7 x 2 x 1.6 cm.                         Histologic type: Invasive mammary carcinoma with mucinous features.                           Histologic grade ([place] Histologic score):                                                 Glandular (acinar)/tubular differentiation: Score 3.                                                Nuclear pleomorphism: Score 2.                                                   Mitotic rate: Score 1.                                                 Overall grade: Grade 2 (score of 6 of 9).                          Tumor Focality: Single focus of invasive carcinoma.                         Ductal carcinoma in situ (DCIS): DCIS is focally present.                                                  Architectural pattern: Cribriform and solid.                                                Nuclear grade: Grade II (intermediate).                                                Necrosis: Not identified.                         Lobular carcinoma in situ (LCIS): Not identified.                          Margins: Uninvolved by invasive carcinoma and by DCIS, but focally narrow.                                                Distance of invasive carcinoma from closest margin: Less than 1 mm (deep margin).                                                 Distance of DCIS from closest margin: 10 mm (deep margin).                                                 NOTE: All additional margins greater than 10 mm from invasive carcinoma and from DCIS.                         Lymph nodes:                                                 Total number of lymph nodes examined (sentinel and nonsentinel): 3.                                                Number of sentinel lymph nodes examined: 3.                                                Number of lymph nodes with macrometastases: 0.                                                Number of lymph nodes with micrometastases: 0.                                                 Number of lymph nodes with isolated tumor cells: 0.                                                 Method of evaluation of sentinel lymph nodes: H&E multiple levels (confirmatory                                                        immunohistochemical stains are available upon request).                           Treatment effect (response to presurgical [neoadjuvant] therapy).                                                  In the breast: No known presurgical therapy.                                                In the lymph nodes: No known presurgical therapy.                           Lymph-vascular invasion: Not identified.                         Dermal lymph-vascular invasion: Not identified.                         Pathologic staging:                                                Primary tumor: pT2.                                                Regional lymph nodes: pN0(sn).                                                Distant metastasis: Not applicable.                         Additional pathologic findings:                                                 Ductal hyperplasia of the usual type.                                                 Fibrocystic changes with duct dilatation and stasis and apocrine metaplasia.                                                 Focal fibroadenomatoid change.                                                Fibroinflammatory changes consistent with site of prior biopsy.                         Ancillary studies: ER, [place], and HER-2/selwyn studies performed on previous biopsy specimen (see                                 [id number]).     JT/brb IHC/a/THM

## 2017-12-08 NOTE — PLAN OF CARE
Problem: Patient Care Overview (Adult)  Goal: Plan of Care Review  Outcome: Ongoing (interventions implemented as appropriate)    12/08/17 0353   Coping/Psychosocial Response Interventions   Plan Of Care Reviewed With patient   Patient Care Overview   Progress improving   Outcome Evaluation   Outcome Summary/Follow up Plan had been drowsy but arousable at the start of the shift. has been alert later during the night. left chest dressing d/i. ELIAN with small amount of drainage. F/C removed at midnight, DTV, able to ambulate in the [place]       Goal: Adult Individualization and Mutuality  Outcome: Ongoing (interventions implemented as appropriate)  Goal: Discharge Needs Assessment  Outcome: Ongoing (interventions implemented as appropriate)    Problem: Perioperative Period (Adult)  Goal: Signs and Symptoms of Listed Potential Problems Will be Absent or Manageable (Perioperative Period)  Outcome: Ongoing (interventions implemented as appropriate)    Problem: Mastectomy (Adult)  Goal: Signs and Symptoms of Listed Potential Problems Will be Absent or Manageable (Mastectomy)  Outcome: Ongoing (interventions implemented as appropriate)

## 2017-12-08 NOTE — PROGRESS NOTES
Discharge Planning Assessment  [facility]     Patient Name: Cristal Sams  MRN: [id number]  Today's Date: 12/8/2017    Admit Date: 12/7/2017          Discharge Needs Assessment       12/08/17 0933    Living Environment    Lives With sibling(s)    Living Arrangements house    Home Accessibility no concerns    Stair Railings at Home none    Type of Financial/Environmental Concern none    Transportation Available car;family or friend will provide    Living Environment    Provides Primary Care For no one    Quality Of Family Relationships supportive    Able to Return to Prior Living Arrangements yes    Discharge Needs Assessment    Concerns To Be Addressed adjustment to diagnosis/illness concerns;denies needs/concerns at this time    Readmission Within The Last 30 Days no previous admission in last 30 days    Anticipated Changes Related to Illness other (see comments)   assistance with ADL    Equipment Needed After Discharge none            Discharge Plan       12/08/17 0930    Case Management/Social Work Plan    Plan Home w/o needs, pt's niece Carol Tran will be staying with her after discharge    Patient/Family In Agreement With Plan yes    Additional Comments I met with pt and her niece Slade Bettencourtson, pt confirmed the PCP, address and Pharmacy are correct. Pt said seh can afford her Rx, uses no DME, has never had home health and does not anticipate the need, Carol said her mother (pt's sister) and pt live together and she will be staying with pt when she returns home.   Angélica Yeh RN          Discharge Placement     No information found        Expected Discharge Date and Time     Expected Discharge Date Expected Discharge Time    Dec 8, 2017               Demographic Summary       12/08/17 0932    Referral Information    Admission Type observation    Arrived From home or self-care    Referral Source admission list    Record Reviewed medical record    Contact Information    Permission Granted to  Share Information With family/designee            Functional Status       12/08/17 0932    Functional Status Current    Ambulation 2-->assistive person    Transferring 2-->assistive person    Toileting 2-->assistive person    Bathing 2-->assistive person    Dressing 2-->assistive person    Eating 0-->independent    Communication 0-->understands/communicates without difficulty    Swallowing (if score 2 or more for any item, consult Rehab Services) 0-->swallows foods/liquids without difficulty    Change in Functional Status Since Onset of Current Illness/Injury yes    Functional Status Prior    Ambulation 0-->independent    Transferring 0-->independent    Toileting 0-->independent    Bathing 0-->independent    Dressing 0-->independent    Eating 0-->independent    Communication 0-->understands/communicates without difficulty    Swallowing 0-->swallows foods/liquids without difficulty    IADL    Medications independent    Meal Preparation independent    Housekeeping independent    Laundry independent    Shopping independent    Oral Care independent            Psychosocial     None            Abuse/Neglect     None            Legal       12/08/17 0935    Legal    Legal Comments Pt's niece Carol Tran said pt's nephew Alexander Soni ([phone]) is POA for pt. I spoke with Mr Soni, he confirmed he is POA and will bring a copy of the POA paperwork if he comes to visit.            Substance Abuse     None            Patient Forms       12/08/17 0934    Patient Forms    Provider Choice List Delivered    Delivered to Patient    Method of delivery In person    Patient Observation Letter Delivered   SIN Observation Letter reviewed by pt and her niece Carol Tran, then signed by pt, copy of letter provided.    Delivered to Patient    Method of delivery In person          Angélica Yeh RN

## 2017-12-11 NOTE — PROGRESS NOTES
Case Management Discharge Note    Final Note: Home with no needs identified. MATT Arthur    Discharge Placement     No information found        Other: Other (Private auto)    Discharge Codes: 01  Discharge to home

## 2017-12-12 ENCOUNTER — TELEPHONE (OUTPATIENT)
Dept: SURGERY | Facility: CLINIC | Age: 82
End: 2017-12-12

## 2017-12-12 NOTE — TELEPHONE ENCOUNTER
Drain totals:  12/8/17    60  12/9/17    60  12/10/17  55  12/11/17  40  12/12/17  40  12/13/17  40  12/14/17  35  12/15/17  30                12/16/17  30  12/17/17  30    lml

## 2017-12-12 NOTE — TELEPHONE ENCOUNTER
DRAIN TOTALS  12/8/17    60  12/9/17    60  12/10/17  55  12/11/17  40    12/14/17  35  12/15/17  30   12/16/17  30  12/17/17  30

## 2017-12-21 ENCOUNTER — OFFICE VISIT (OUTPATIENT)
Dept: SURGERY | Facility: CLINIC | Age: 82
End: 2017-12-21

## 2017-12-21 VITALS
WEIGHT: 98 LBS | HEIGHT: 58 IN | OXYGEN SATURATION: 99 % | HEART RATE: 64 BPM | SYSTOLIC BLOOD PRESSURE: 178 MMHG | DIASTOLIC BLOOD PRESSURE: 62 MMHG | BODY MASS INDEX: 20.57 KG/M2

## 2017-12-21 DIAGNOSIS — C50.112 MALIGNANT NEOPLASM OF CENTRAL PORTION OF LEFT FEMALE BREAST, UNSPECIFIED ESTROGEN RECEPTOR STATUS (HCC): Primary | ICD-10-CM

## 2017-12-21 PROCEDURE — 99024 POSTOP FOLLOW-UP VISIT: CPT | Performed by: SURGERY

## 2017-12-21 RX ORDER — ONDANSETRON 4 MG/1
TABLET, FILM COATED ORAL
COMMUNITY
Start: 2017-12-08 | End: 2018-02-16

## 2017-12-21 RX ORDER — HYDROCODONE BITARTRATE AND ACETAMINOPHEN 5; 325 MG/1; MG/1
TABLET ORAL
COMMUNITY
Start: 2017-12-08 | End: 2018-02-16
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
## 2017-12-29 ENCOUNTER — OFFICE VISIT (OUTPATIENT)
Dept: ONCOLOGY | Facility: CLINIC | Age: 82
End: 2017-12-29

## 2017-12-29 ENCOUNTER — LAB (OUTPATIENT)
Dept: LAB | Facility: HOSPITAL | Age: 82
End: 2017-12-29

## 2017-12-29 VITALS
OXYGEN SATURATION: 98 % | WEIGHT: 97.6 LBS | BODY MASS INDEX: 20.49 KG/M2 | HEIGHT: 58 IN | SYSTOLIC BLOOD PRESSURE: 146 MMHG | TEMPERATURE: 98.1 F | HEART RATE: 52 BPM | DIASTOLIC BLOOD PRESSURE: 78 MMHG | RESPIRATION RATE: 12 BRPM

## 2017-12-29 DIAGNOSIS — Z78.0 POST-MENOPAUSAL: ICD-10-CM

## 2017-12-29 DIAGNOSIS — C50.919 MALIGNANT NEOPLASM OF BREAST IN FEMALE, ESTROGEN RECEPTOR POSITIVE, UNSPECIFIED LATERALITY, UNSPECIFIED SITE OF BREAST (HCC): Primary | ICD-10-CM

## 2017-12-29 DIAGNOSIS — Z17.0 MALIGNANT NEOPLASM OF BREAST IN FEMALE, ESTROGEN RECEPTOR POSITIVE, UNSPECIFIED LATERALITY, UNSPECIFIED SITE OF BREAST (HCC): Primary | ICD-10-CM

## 2017-12-29 DIAGNOSIS — Z17.0 MALIGNANT NEOPLASM OF CENTRAL PORTION OF LEFT BREAST IN FEMALE, ESTROGEN RECEPTOR POSITIVE (HCC): Primary | ICD-10-CM

## 2017-12-29 DIAGNOSIS — M81.0 OSTEOPOROSIS, UNSPECIFIED OSTEOPOROSIS TYPE, UNSPECIFIED PATHOLOGICAL FRACTURE PRESENCE: ICD-10-CM

## 2017-12-29 DIAGNOSIS — C50.112 MALIGNANT NEOPLASM OF CENTRAL PORTION OF LEFT BREAST IN FEMALE, ESTROGEN RECEPTOR POSITIVE (HCC): Primary | ICD-10-CM

## 2017-12-29 LAB
BASOPHILS # BLD AUTO: 0.04 10*3/MM3 (ref 0–0.1)
BASOPHILS NFR BLD AUTO: 0.7 % (ref 0–1.1)
DEPRECATED RDW RBC AUTO: 42.9 FL (ref 37–49)
EOSINOPHIL # BLD AUTO: 0.15 10*3/MM3 (ref 0–0.36)
EOSINOPHIL NFR BLD AUTO: 2.5 % (ref 1–5)
ERYTHROCYTE [DISTWIDTH] IN BLOOD BY AUTOMATED COUNT: 12.6 % (ref 11.7–14.5)
HCT VFR BLD AUTO: 38.2 % (ref 34–45)
HGB BLD-MCNC: 12.9 G/DL (ref 11.5–14.9)
IMM GRANULOCYTES # BLD: 0.02 10*3/MM3 (ref 0–0.03)
IMM GRANULOCYTES NFR BLD: 0.3 % (ref 0–0.5)
LYMPHOCYTES # BLD AUTO: 1.8 10*3/MM3 (ref 1–3.5)
LYMPHOCYTES NFR BLD AUTO: 29.9 % (ref 20–49)
MCH RBC QN AUTO: 31.2 PG (ref 27–33)
MCHC RBC AUTO-ENTMCNC: 33.8 G/DL (ref 32–35)
MCV RBC AUTO: 92.5 FL (ref 83–97)
MONOCYTES # BLD AUTO: 0.5 10*3/MM3 (ref 0.25–0.8)
MONOCYTES NFR BLD AUTO: 8.3 % (ref 4–12)
NEUTROPHILS # BLD AUTO: 3.51 10*3/MM3 (ref 1.5–7)
NEUTROPHILS NFR BLD AUTO: 58.3 % (ref 39–75)
NRBC BLD MANUAL-RTO: 0 /100 WBC (ref 0–0)
PLATELET # BLD AUTO: 304 10*3/MM3 (ref 150–375)
PMV BLD AUTO: 10.2 FL (ref 8.9–12.1)
RBC # BLD AUTO: 4.13 10*6/MM3 (ref 3.9–5)
WBC NRBC COR # BLD: 6.02 10*3/MM3 (ref 4–10)

## 2017-12-29 PROCEDURE — 36416 COLLJ CAPILLARY BLOOD SPEC: CPT | Performed by: INTERNAL MEDICINE

## 2017-12-29 PROCEDURE — 99215 OFFICE O/P EST HI 40 MIN: CPT | Performed by: INTERNAL MEDICINE

## 2017-12-29 PROCEDURE — 85025 COMPLETE CBC W/AUTO DIFF WBC: CPT | Performed by: INTERNAL MEDICINE

## 2017-12-29 NOTE — PROGRESS NOTES
Subjective .     REASONS FOR FOLLOWUP: 1.  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    2.  Status post left mastectomy with sentinel lymph node biopsy on December 7, 2017.  It is T2 N0 M0, stage IIA, ER positive NJ positive HER-2/selwyn negative, grade 2, tumor size is 2.7×1.6 cm, invasive colloid carcinoma with 3 cm lymph nodes negative.  Focal ductal carcinoma in situ is present..    3.  CT scan shows  small nodules in the lung in the right middle and lower lobe and 2 low-density lesions in the liver which could be followed.    History of Present Illness   patient is a 89-year-old female with newly diagnosed left breast cancer.  She had noticed a left breast mass for several years.  More recently she found that it was getting bigger.  She also had complained of 43 pound weight loss over 3 years.  She underwent bilateral diagnostic mammogram on September 13, 2017.  She had left nipple retraction.  In the retroareolar region of the left breast at 12 o'clock position she had an irregular mass which was 3.1 cm.  No evidence of axillary adenopathy.  Right breast was negative.  On ultrasound she had a 2.5 cm mass.  No evidence of axillary adenopathy.  Biopsy was consistent with invasive mammary carcinoma with abundant mucus consistent with mucinous, colloid carcinoma.  It was intermediate grade.  Cochranville score was 6 out of 9.    Her estrogen receptor was greater than 90%, progesterone receptor greater than 40% HER-2/selwyn was 2+ by IHC but negative by fish.  She was referred to Dr. Ponce for surgery.  Given her weight loss Dr. Ponce obtained CT scan of the chest abdomen pelvis as well as a bone scan.    CT scan showed the left breast mass which measured 27×16 mm.  No evidence of axillary or supple with total nadege enlargement.  Heart size is slightly enlarged.  There is no evidence for mediastinal or  hilar nadege enlargement.  Within the right middle lobe there is a 3 mm pulmonary nodule.  A 2 mm inferior right middle lobe nodule is also present in the right major fissure.  There is a 5 mm nodule within the anterior inferior medial right lower lobe.  There are additional smaller nodules within the right middle lobe.  There is an oblong obesity in the right lung base measuring 9×4 mm.  There is calcified granulomas in both lower lobes.  Within the left upper lobe there is a 2 mm nodule.  And a second nodule which is 5 mm.  Within the left manubrium there is a 5 mm sclerotic focus which needs to be followed.  CT of the abdomen pelvis shows 2 tiny nodules in the liver one is 5 mm and a second one is 7 mm.  But there was no definite evidence of metastatic disease in the chest abdomen or pelvis.    Bone scan showed mild focal increased uptake in the right anterior lateral ninth rib consistent with the healed fracture.  No bony evidence of metastases.    Patient underwent left mastectomy and left axillary sentinel lymph node biopsy on December 7, 2017.    Pathology shows invasive mammary carcinoma with mucinous features which is 2.7×2×1.6 cm in size.  It is grade 2, Gregory score of 6 out of 9.  It's a single focus of invasive carcinoma.  Ductal carcinoma in situ is focally present.  Which is grade 2 as well.  No evidence of lobular carcinoma in situ.  Margins are uninvolved by invasive carcinoma and by DCIS.  Closest margin from invasive carcinoma is less than 1 mm in the deep margin.  Distance of ductal carcinoma in situ from closest margin is 10 mm.  All additional margins are greater than 10 mm from invasive carcinoma and from DCIS.  Total of number of lymph nodes examined is 3 and none of them are involved.  It is a T2 N0 M0 invasive mammary carcinoma.    She is here to discuss further options of treatment.        Past Medical History:   Diagnosis Date   • Anemia    • Arthritis    • Breast cancer     LEFT   •  Colon polyp    • Diabetes mellitus     DIET CONTROLLED   • GERD (gastroesophageal reflux disease)    • H/O Cataract    • Hiatal hernia    • History of hepatitis 1958   • History of kidney stone    • History of peptic ulcer    • History of vertigo    • Hyperlipidemia    • Hypertension    • Scoliosis    • Unexplained weight loss     #43 lb in 3 months    • Visual impairment        ONCOLOGIC HISTORY:  (History from previous dates can be found in the separate document.)  patient is a 89-year-old female who has history of hypertension and high cholesterol, history of peptic ulcer disease in the past on  Nexium, was found to have a mass in the left breast for more than a year.  She states that it was a small lesion which gradually increased in size.  She thought it was nothing.  She has a 96-year-old sister who lives with her and had some calcifications which on biopsy were negative.  She thought her lesion in the breast will resolve.  More recently she had pain in the left lower extremity coming from the back as a result she went to Dr. Rock.  He did plain x-rays of the lumbar spine and left knee and at the same time she told him that she had this breast mass.  She then underwent a mammogram.  A bilateral diagnostic mammogram was done.  This showed the left nipple was retracted.  In the retroareolar region of the left breast at the 12 o'clock position there was an irregular 3.1 cm mass.  Left breast skin thickening is also noted.  No evidence for axillary adenopathy is seen in either breast.  No suspicious findings were seen in the right breast.     Ultrasound of the left breast showed that at 12 o'clock position there was an irregular 2.5 cm heterogeneous mass.  No evidence of left axillary adenopathy was appreciated.  This was done on September 13, 2017.  Subsequently patient underwent ultrasound-guided biopsy on September 26, 2017.  The pathology showed invasive mammary carcinoma with abundant mucus consistent with  mucinous colloid carcinoma.  It is intermediate grade, Fontana score is 6.  Invasive carcinoma involves multiple core biopsy fragments spanning at least 10 mm.  Estrogen receptors greater than 90%, progesterone receptor is 40%, HER-2/selwyn is 2+ by immunohistochemistry, HER-2 copy number is 2.2 with a HER-2 CEP ratio of 1.1, negative.  She has been referred here for further options of treatment.     Patient states that she has a cousin, who is her dad's twin brother's daughter who developed breast cancer at age 70.  Her mother had uterine cancer at age 87 and  from it.  She also thinks she has several cousins with breast cancer but they were older but she's unsure how old.     Patient is very active at home.  She cooks, does yard work, she removes weeds, trims  her shrubs.     She has vertigo when she turns towards the left.  This thought to be positional vertigo but she has not been evaluated by ENT.  She had left leg pain and back pain which is now resolved.     Patient had mild increase in calcium at 10.2.  She underwent a PTH level which was slightly elevated at 74.  She has been referred to endocrinology.  Her calcium today though is down to 9.8.  She also had a very low 25-hydroxy vitamin D level at 7.7 and has been placed on 50,000 units of vitamin D3 every weekly.  She has not been taking it since September  Patient underwent left mastectomy and left axillary sentinel lymph node biopsy on 2017.    Pathology shows invasive mammary carcinoma with mucinous features which is 2.7×2×1.6 cm in size.  It is grade 2, Shaq score of 6 out of 9.  It's a single focus of invasive carcinoma.  Ductal carcinoma in situ is focally present.  Which is grade 2 as well.  No evidence of lobular carcinoma in situ.  Margins are uninvolved by invasive carcinoma and by DCIS.  Closest margin from invasive carcinoma is less than 1 mm in the deep margin.  Distance of ductal carcinoma in situ from closest margin is  10 mm.  All additional margins are greater than 10 mm from invasive carcinoma and from DCIS.  Total of number of lymph nodes examined is 3 and none of them are involved.  It is a T2 N0 M0 invasive mammary carcinoma.    Patient not a candidate for chemotherapy given her age.  Also refused hormonal therapy.    Current Outpatient Prescriptions on File Prior to Visit   Medication Sig Dispense Refill   • amLODIPine (NORVASC) 5 MG tablet Take 1 tablet by mouth Daily. 30 tablet 3   • ergocalciferol (DRISDOL) 11310 units capsule Take 1 capsule by mouth 2 (Two) Times a Week. (Patient taking differently: Take 50,000 Units by mouth 2 (Two) Times a Week. TAKES ON Sunday AND WEDNESDAY) 26 capsule 3   • esomeprazole (nexIUM) 40 MG capsule Take 40 mg by mouth Every Morning Before Breakfast.     • glucose blood (ONE TOUCH ULTRA TEST) test strip Test Blood Sugar Once Daily 100 each 2   • HYDROcodone-acetaminophen (NORCO) 5-325 MG per tablet      • Lancets (ONETOUCH ULTRASOFT) lancets USE TO TEST BLOOD SUGAR ONCE DAILY 100 each 2   • lisinopril (PRINIVIL,ZESTRIL) 40 MG tablet Take 40 mg by mouth Daily.     • ondansetron (ZOFRAN) 4 MG tablet      • rosuvastatin (CRESTOR) 5 MG tablet Take 5 mg by mouth Daily.       Current Facility-Administered Medications on File Prior to Visit   Medication Dose Route Frequency Provider Last Rate Last Dose   • cyanocobalamin injection 1,000 mcg  1,000 mcg Intramuscular Q28 Days George Rock MD   1,000 mcg at 09/08/17 1219       ALLERGIES:     Allergies   Allergen Reactions   • Contrast Dye    • Novocain [Procaine] Palpitations and Parasthesia     LEG GET NUMB   • Aleve [Naproxen Sodium] GI Intolerance   • Cortizone-10 [Hydrocortisone] Other (See Comments)     UNSURE   • Eye Drops [Naphazoline-Polyethyl Glycol] Other (See Comments)     UNSURE   • Lipitor [Atorvastatin] Other (See Comments)     UNSURE   • Sulfur Other (See Comments)     UNSURE   • Valium [Diazepam] Other (See Comments)     Doesn't  "like the way it makes her feel   • Zocor [Simvastatin] Other (See Comments)     UNSURE   • Zyrtec [Cetirizine] Other (See Comments)     UNSURE     OB/GYN history: Patient started menstrual period at age 14.  She had menopause at age 55.  She has not had any children and she has not had any miscarriages.     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  She retired at age 63 in .  She never smoked.  She never did alcohol.  She is .  She now lives with her 96-year-old sister.  She is very active.     Family history: Father  of an accident at age 56.  Mother  of uterine cancer at age 87.  She had a brother who  of lung cancer at age 61.  She has a brother with heart attack at age 46.  She lives with her sister was 96-year-old.  Her father's brother's daughter had breast cancer.  At age 70.  She states that several of her cousins had breast cancer.  But they were all older.         Cancer-related family history includes Lung cancer in her brother; Uterine cancer in her mother.     Review of Systems  A comprehensive 14 point review of systems was performed and was negative except as mentioned.    Objective      Vitals:    17 1338   BP: 146/78   Pulse: 52   Resp: 12   Temp: 98.1 °F (36.7 °C)   TempSrc: Oral   SpO2: 98%   Weight: 44.3 kg (97 lb 9.6 oz)   Height: 147.5 cm (58.07\")   PainSc: 0-No pain     Current Status 2017   ECOG score 0       Physical Exam    GENERAL:  Well-developed, well-nourished in no acute distress.   SKIN:  Warm, dry without rashes, purpura or petechiae.  EYES:  Pupils equal, round and reactive to light.  EOMs intact.  Conjunctivae normal.  EARS:  Hearing intact.  NOSE:  Septum midline.  No excoriations or nasal discharge.  MOUTH:  Tongue is well-papillated; no stomatitis or ulcers.  Lips normal.  THROAT:  Oropharynx without lesions or " exudates.  NECK:  Supple with good range of motion; no thyromegaly or masses, no JVD.  LYMPHATICS:  No cervical, supraclavicular, axillary or inguinal adenopathy.  CHEST:  Lungs clear to auscultation. Good airflow.Some significant fluid collection in the left chest wall.  Breasts: She is status post left mastectomy but appears to have fluid collection in the left chest wall area.  The surgical scar is healed up.  There is no evidence of any left axillary adenopathy or left supraclavicular adenopathy.  But there appears to be some fluid collection in the left chest wall.  Right breast: No evidence of any breast mass, no nipple retraction, no right axillary adenopathy, no right supraclavicular adenopathy, no skin changes.  CARDIAC:  Regular rate and rhythm without murmurs, rubs or gallops. Normal S1,S2.  ABDOMEN:  Soft, nontender with no hepatosplenomegaly or masses.  EXTREMITIES:  No clubbing, cyanosis or edema.  NEUROLOGICAL:  Cranial Nerves II-XII grossly intact.  No focal neurological deficits.  PSYCHIATRIC:  Normal affect and mood.        RECENT LABS:  Hematology WBC   Date Value Ref Range Status   12/29/2017 6.02 4.00 - 10.00 10*3/mm3 Final     RBC   Date Value Ref Range Status   12/29/2017 4.13 3.90 - 5.00 10*6/mm3 Final     Hemoglobin   Date Value Ref Range Status   12/29/2017 12.9 11.5 - 14.9 g/dL Final     Hematocrit   Date Value Ref Range Status   12/29/2017 38.2 34.0 - 45.0 % Final     Platelets   Date Value Ref Range Status   12/29/2017 304 150 - 375 10*3/mm3 Final        Assessment/Plan     1.  Left breast mass,T2 N0,radiologically, T3 N0 clinically,  patient noticed a mass in the left breast since a year and had been slowly growing.  She noted as she thought it'll resolve.  Subsequently more recently she underwent a bilateral diagnostic mammogram which showed nipple retraction on the left breast along with a 3.1 cm retroareolar mass with slight skin thickening.  There was no evidence of axillary  adenopathy on the left side.  The right breast was normal.  She then underwent an ultrasound which showed a 2.5 cm mass is regular on the left retroareolar region.  It did not show evidence of any left axillary lymph node.  Patient underwent a biopsy of the breast mass by ultrasound guidance.  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  It was intermediate grade.  Shaq score is 6.  Invasive carcinoma was present in multiple core biopsy fragments spanning 10 mm.     It was estrogen receptor greater than 90%, progesterone receptor 40%, HER-2/selwyn 2+ by immunohistochemistry and  Is 2.2 with a HER-2/CEP ratio of 1.1 which is negative by fish. 2.  Mildly elevated calcium at 10.2 with elevated PTH, her repeat calcium is 9.8 today.  But she is being evaluated by endocrine.  Her hypercalcemia seems to be resolved.  · Status post left mastectomy with sentinel lymph node biopsy.  Pathology shows T2 N0 M0 invasive colloid carcinoma, grade 2 with focal ductal carcinoma in situ at surgery.  3 sentinel lymph nodes are negative.  She has good margins.  She does not require any radiation.  · Discussed in length about hormonal therapy as patient not a candidate for chemotherapy given her age and medical problems.  Patient is not keen to continue any hormonal preparations.  We discussed about both tamoxifen and Arimidex and their side effects in length.  Given her age certainly she could have osteoporosis and a DEXA scan is warranted.  However patient refuses tamoxifen or Arimidex.  Her niece is with her and she is in agreement with that.     3.  Family history of her cousin having had breast cancer at age 70.  No first-degree relative with breast cancer.  However she states several of her cousins have had breast cancer all when they were older.  Patient does not have any children of her own and does not want any genetic counseling.     4.  Severe vitamin D  deficiency for which I encouraged her to continue vitamin D 50,000 units by mouth once weekly.    5.  CT scan showing lung nodules and 2 low-density lesions in the liver which can be followed by CT scan in 6 months.    6.  Left chest wall fluid collection, appears significant.  There is no pain at the site.  I'm unsure if this requires drainage and will refer to Dr. Ponce.    Plan 1.  Patient not a candidate for chemotherapy.  ·       Patient refuses hormonal therapy with either tamoxifen or Arimidex.  Given her age I believe it is reasonable to observe.  We discussed about Evista as well but patient not a very keen.  · Refer to Dr. Ponce to evaluate fluid collection on the left chest wall.  · Will obtain DEXA scan.  · Follow-up with me in 5 weeks.  · Consider repeat CT scan in 6 months to follow-up on the lung nodules if patient willing.    MD Dr. Shanita Arora Dr.                     Cc:  George Rock MD

## 2018-01-01 ENCOUNTER — OFFICE VISIT (OUTPATIENT)
Dept: FAMILY MEDICINE CLINIC | Facility: CLINIC | Age: 83
End: 2018-01-01

## 2018-01-01 VITALS
BODY MASS INDEX: 18.1 KG/M2 | OXYGEN SATURATION: 99 % | DIASTOLIC BLOOD PRESSURE: 60 MMHG | SYSTOLIC BLOOD PRESSURE: 134 MMHG | TEMPERATURE: 98 F | WEIGHT: 106 LBS | RESPIRATION RATE: 14 BRPM | HEIGHT: 64 IN | HEART RATE: 53 BPM

## 2018-01-01 DIAGNOSIS — E78.2 MIXED HYPERLIPIDEMIA: ICD-10-CM

## 2018-01-01 DIAGNOSIS — R79.9 ABNORMAL FINDING OF BLOOD CHEMISTRY: ICD-10-CM

## 2018-01-01 DIAGNOSIS — Z00.00 MEDICARE ANNUAL WELLNESS VISIT, INITIAL: Primary | ICD-10-CM

## 2018-01-01 DIAGNOSIS — E53.8 B12 DEFICIENCY: ICD-10-CM

## 2018-01-01 DIAGNOSIS — E55.9 VITAMIN D DEFICIENCY: ICD-10-CM

## 2018-01-01 DIAGNOSIS — I10 ESSENTIAL HYPERTENSION: ICD-10-CM

## 2018-01-01 DIAGNOSIS — K21.00 GASTROESOPHAGEAL REFLUX DISEASE WITH ESOPHAGITIS: ICD-10-CM

## 2018-01-01 LAB
25(OH)D3 SERPL-MCNC: 56 NG/ML (ref 30–100)
ALBUMIN SERPL-MCNC: 4.4 G/DL (ref 3.5–5.2)
ALBUMIN/GLOB SERPL: 1.7 G/DL
ALP SERPL-CCNC: 70 U/L (ref 39–117)
ALT SERPL W P-5'-P-CCNC: 13 U/L (ref 1–33)
ANION GAP SERPL CALCULATED.3IONS-SCNC: 8.9 MMOL/L
AST SERPL-CCNC: 17 U/L (ref 1–32)
BILIRUB SERPL-MCNC: 0.4 MG/DL (ref 0.1–1.2)
BUN BLD-MCNC: 12 MG/DL (ref 8–23)
BUN/CREAT SERPL: 18.5 (ref 7–25)
CALCIUM SPEC-SCNC: 9.8 MG/DL (ref 8.2–9.6)
CHLORIDE SERPL-SCNC: 103 MMOL/L (ref 98–107)
CHOLEST SERPL-MCNC: 150 MG/DL (ref 0–200)
CO2 SERPL-SCNC: 29.1 MMOL/L (ref 22–29)
CREAT BLD-MCNC: 0.65 MG/DL (ref 0.57–1)
ERYTHROCYTE [DISTWIDTH] IN BLOOD BY AUTOMATED COUNT: 13.5 % (ref 4.5–15)
GFR SERPL CREATININE-BSD FRML MDRD: 85 ML/MIN/1.73
GLOBULIN UR ELPH-MCNC: 2.6 GM/DL
GLUCOSE BLD-MCNC: 107 MG/DL (ref 65–99)
HBA1C MFR BLD: 5.2 % (ref 4.8–5.6)
HCT VFR BLD AUTO: 38.1 % (ref 31–42)
HDLC SERPL-MCNC: 80 MG/DL (ref 40–60)
HGB BLD-MCNC: 12 G/DL (ref 12–18)
LDLC SERPL CALC-MCNC: 53 MG/DL (ref 0–100)
LDLC/HDLC SERPL: 0.67 {RATIO}
LYMPHOCYTES # BLD AUTO: 2.4 10*3/MM3 (ref 1.2–3.4)
LYMPHOCYTES NFR BLD AUTO: 28.7 % (ref 21–51)
MCH RBC QN AUTO: 30.1 PG (ref 26.1–33.1)
MCHC RBC AUTO-ENTMCNC: 31.7 G/DL (ref 33–37)
MCV RBC AUTO: 95 FL (ref 80–99)
MONOCYTES # BLD AUTO: 0.3 10*3/MM3 (ref 0.1–0.6)
MONOCYTES NFR BLD AUTO: 4 % (ref 2–9)
NEUTROPHILS # BLD AUTO: 5.5 10*3/MM3 (ref 1.4–6.5)
NEUTROPHILS NFR BLD AUTO: 67.3 % (ref 42–75)
PLATELET # BLD AUTO: 303 10*3/MM3 (ref 150–450)
PMV BLD AUTO: 7.8 FL (ref 7.1–10.5)
POTASSIUM BLD-SCNC: 4 MMOL/L (ref 3.5–5.2)
PROT SERPL-MCNC: 7 G/DL (ref 6–8.5)
RBC # BLD AUTO: 4.01 10*6/MM3 (ref 4–6)
SODIUM BLD-SCNC: 141 MMOL/L (ref 136–145)
TRIGL SERPL-MCNC: 84 MG/DL (ref 0–150)
VLDLC SERPL-MCNC: 16.8 MG/DL (ref 5–40)
WBC NRBC COR # BLD: 8.2 10*3/MM3 (ref 4.5–10)

## 2018-01-01 PROCEDURE — 96372 THER/PROPH/DIAG INJ SC/IM: CPT | Performed by: INTERNAL MEDICINE

## 2018-01-01 PROCEDURE — 82306 VITAMIN D 25 HYDROXY: CPT | Performed by: INTERNAL MEDICINE

## 2018-01-01 PROCEDURE — 83036 HEMOGLOBIN GLYCOSYLATED A1C: CPT | Performed by: INTERNAL MEDICINE

## 2018-01-01 PROCEDURE — 80053 COMPREHEN METABOLIC PANEL: CPT | Performed by: INTERNAL MEDICINE

## 2018-01-01 PROCEDURE — 99214 OFFICE O/P EST MOD 30 MIN: CPT | Performed by: INTERNAL MEDICINE

## 2018-01-01 PROCEDURE — 85025 COMPLETE CBC W/AUTO DIFF WBC: CPT | Performed by: INTERNAL MEDICINE

## 2018-01-01 PROCEDURE — 80061 LIPID PANEL: CPT | Performed by: INTERNAL MEDICINE

## 2018-01-01 PROCEDURE — 36415 COLL VENOUS BLD VENIPUNCTURE: CPT | Performed by: INTERNAL MEDICINE

## 2018-01-01 PROCEDURE — G0439 PPPS, SUBSEQ VISIT: HCPCS | Performed by: INTERNAL MEDICINE

## 2018-01-01 RX ORDER — CYANOCOBALAMIN 1000 UG/ML
1000 INJECTION, SOLUTION INTRAMUSCULAR; SUBCUTANEOUS
Status: DISCONTINUED | OUTPATIENT
Start: 2018-01-01 | End: 2019-01-01

## 2018-01-01 RX ORDER — BENZONATATE 100 MG/1
100 CAPSULE ORAL 3 TIMES DAILY PRN
COMMUNITY
End: 2019-01-01

## 2018-01-01 RX ORDER — HYDROCODONE BITARTRATE AND ACETAMINOPHEN 5; 325 MG/1; MG/1
1 TABLET ORAL EVERY 8 HOURS PRN
COMMUNITY
End: 2018-01-01

## 2018-01-01 RX ADMIN — CYANOCOBALAMIN 1000 MCG: 1000 INJECTION, SOLUTION INTRAMUSCULAR; SUBCUTANEOUS at 14:12

## 2018-01-02 ENCOUNTER — TELEPHONE (OUTPATIENT)
Dept: SURGERY | Facility: CLINIC | Age: 83
End: 2018-01-02

## 2018-01-02 NOTE — TELEPHONE ENCOUNTER
Note from Dr. DE JESUS dated December 31, 2017: Patient does not require radiation.  Patient refuses tamoxifen or Arimidex.  Severe vitamin D deficiency, continue vitamin D supplementation.  CT scan showing lung nodules and to low-density liver lesions followed by CT in 6 months.  Left chest wall fluid collection, no pain.  Unsure if this requires drainage so we will refer to Dr. Ponce.FU 5 weeks.

## 2018-01-02 NOTE — TELEPHONE ENCOUNTER
Significant left chest wall swelling document by Medical Oncology  Called patient, no redness, no pain, only swelling.   Scheduled tomorrow 1/3/18 12:15 pm.     lml

## 2018-01-03 ENCOUNTER — OFFICE VISIT (OUTPATIENT)
Dept: SURGERY | Facility: CLINIC | Age: 83
End: 2018-01-03

## 2018-01-03 VITALS
BODY MASS INDEX: 16.35 KG/M2 | SYSTOLIC BLOOD PRESSURE: 132 MMHG | OXYGEN SATURATION: 98 % | WEIGHT: 95.8 LBS | HEIGHT: 64 IN | DIASTOLIC BLOOD PRESSURE: 69 MMHG | HEART RATE: 58 BPM

## 2018-01-03 DIAGNOSIS — C50.112 MALIGNANT NEOPLASM OF CENTRAL PORTION OF LEFT FEMALE BREAST, UNSPECIFIED ESTROGEN RECEPTOR STATUS (HCC): Primary | ICD-10-CM

## 2018-01-03 PROCEDURE — 99024 POSTOP FOLLOW-UP VISIT: CPT | Performed by: SURGERY
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
## 2018-01-10 RX ORDER — ROSUVASTATIN CALCIUM 5 MG/1
TABLET, COATED ORAL
Qty: 30 TABLET | Refills: 1 | Status: SHIPPED | OUTPATIENT
Start: 2018-01-10 | End: 2018-02-16 | Stop reason: SDUPTHER

## 2018-01-11 ENCOUNTER — DOCUMENTATION (OUTPATIENT)
Dept: ONCOLOGY | Facility: CLINIC | Age: 83
End: 2018-01-11

## 2018-01-11 NOTE — PROGRESS NOTES
Patient is a 89-year-old female, postmenopausal and concerning for osteoporosis.  Please obtain DEXA scan.    Milly Penaloza MD

## 2018-01-26 ENCOUNTER — HOSPITAL ENCOUNTER (OUTPATIENT)
Dept: CT IMAGING | Facility: HOSPITAL | Age: 83
Discharge: HOME | End: 2018-01-26
Attending: INTERNAL MEDICINE | Admitting: INTERNAL MEDICINE

## 2018-01-26 ENCOUNTER — HOSPITAL ENCOUNTER (OUTPATIENT)
Dept: BONE DENSITY | Facility: HOSPITAL | Age: 83
Discharge: HOME | End: 2018-01-26
Attending: INTERNAL MEDICINE

## 2018-01-26 DIAGNOSIS — Z78.0 POST-MENOPAUSAL: ICD-10-CM

## 2018-01-26 DIAGNOSIS — M81.0 OSTEOPOROSIS, UNSPECIFIED OSTEOPOROSIS TYPE, UNSPECIFIED PATHOLOGICAL FRACTURE PRESENCE: ICD-10-CM

## 2018-01-26 DIAGNOSIS — Z17.0 MALIGNANT NEOPLASM OF BREAST IN FEMALE, ESTROGEN RECEPTOR POSITIVE, UNSPECIFIED LATERALITY, UNSPECIFIED SITE OF BREAST (HCC): ICD-10-CM

## 2018-01-26 DIAGNOSIS — C50.919 MALIGNANT NEOPLASM OF BREAST IN FEMALE, ESTROGEN RECEPTOR POSITIVE, UNSPECIFIED LATERALITY, UNSPECIFIED SITE OF BREAST (HCC): ICD-10-CM

## 2018-01-26 PROCEDURE — 71250 CT THORAX DX C-: CPT

## 2018-01-26 PROCEDURE — 74176 CT ABD & PELVIS W/O CONTRAST: CPT

## 2018-01-26 PROCEDURE — 77080 DXA BONE DENSITY AXIAL: CPT

## 2018-02-07 ENCOUNTER — APPOINTMENT (OUTPATIENT)
Dept: LAB | Facility: HOSPITAL | Age: 83
End: 2018-02-07

## 2018-02-07 ENCOUNTER — APPOINTMENT (OUTPATIENT)
Dept: ONCOLOGY | Facility: CLINIC | Age: 83
End: 2018-02-07

## 2018-02-07 ENCOUNTER — TELEPHONE (OUTPATIENT)
Dept: ONCOLOGY | Facility: CLINIC | Age: 83
End: 2018-02-07

## 2018-02-07 NOTE — TELEPHONE ENCOUNTER
Pt. Called to cancel today's appt due to transportation problems.  Normally her niece brings her , but she cannot due to her child being ill.  Pt. Was resched. For 3/9/18.  Pt. Denies having any problems at this time.  Advised pt. To call for any problems in the interim.  V/u.

## 2018-02-16 ENCOUNTER — HOSPITAL ENCOUNTER (INPATIENT)
Facility: HOSPITAL | Age: 83
LOS: 7 days | Discharge: HOME | End: 2018-02-23
Attending: FAMILY MEDICINE | Admitting: HOSPITALIST

## 2018-02-16 ENCOUNTER — APPOINTMENT (OUTPATIENT)
Dept: CT IMAGING | Facility: HOSPITAL | Age: 83
End: 2018-02-16

## 2018-02-16 ENCOUNTER — APPOINTMENT (OUTPATIENT)
Dept: GENERAL RADIOLOGY | Facility: HOSPITAL | Age: 83
End: 2018-02-16

## 2018-02-16 ENCOUNTER — OFFICE VISIT (OUTPATIENT)
Dept: FAMILY MEDICINE CLINIC | Facility: CLINIC | Age: 83
End: 2018-02-16

## 2018-02-16 VITALS
OXYGEN SATURATION: 98 % | DIASTOLIC BLOOD PRESSURE: 60 MMHG | WEIGHT: 97.2 LBS | TEMPERATURE: 98.6 F | RESPIRATION RATE: 16 BRPM | SYSTOLIC BLOOD PRESSURE: 100 MMHG | BODY MASS INDEX: 19.6 KG/M2 | HEIGHT: 59 IN | HEART RATE: 69 BPM

## 2018-02-16 DIAGNOSIS — J10.1 INFLUENZA B: ICD-10-CM

## 2018-02-16 DIAGNOSIS — R05.9 COUGH: ICD-10-CM

## 2018-02-16 DIAGNOSIS — R55 SYNCOPE, UNSPECIFIED SYNCOPE TYPE: ICD-10-CM

## 2018-02-16 DIAGNOSIS — R56.9 SEIZURE-LIKE ACTIVITY (HCC): ICD-10-CM

## 2018-02-16 DIAGNOSIS — J10.1 INFLUENZA B: Primary | ICD-10-CM

## 2018-02-16 DIAGNOSIS — R26.89 DECREASED MOBILITY: ICD-10-CM

## 2018-02-16 DIAGNOSIS — R56.9 SEIZURE (HCC): ICD-10-CM

## 2018-02-16 DIAGNOSIS — R68.89 FLU-LIKE SYMPTOMS: Primary | ICD-10-CM

## 2018-02-16 PROBLEM — E87.1 HYPONATREMIA: Status: ACTIVE | Noted: 2018-02-16

## 2018-02-16 LAB
ALBUMIN SERPL-MCNC: 3.7 G/DL (ref 3.5–5.2)
ALBUMIN/GLOB SERPL: 1.5 G/DL
ALP SERPL-CCNC: 59 U/L (ref 39–117)
ALT SERPL W P-5'-P-CCNC: 13 U/L (ref 1–33)
ANION GAP SERPL CALCULATED.3IONS-SCNC: 12.4 MMOL/L
AST SERPL-CCNC: 23 U/L (ref 1–32)
BASOPHILS # BLD AUTO: 0.01 10*3/MM3 (ref 0–0.2)
BASOPHILS NFR BLD AUTO: 0.3 % (ref 0–1.5)
BILIRUB SERPL-MCNC: 0.2 MG/DL (ref 0.1–1.2)
BILIRUB UR QL STRIP: NEGATIVE
BUN BLD-MCNC: 13 MG/DL (ref 8–23)
BUN/CREAT SERPL: 16.9 (ref 7–25)
CALCIUM SPEC-SCNC: 8.8 MG/DL (ref 8.2–9.6)
CHLORIDE SERPL-SCNC: 91 MMOL/L (ref 98–107)
CLARITY UR: CLEAR
CO2 SERPL-SCNC: 24.6 MMOL/L (ref 22–29)
COLOR UR: YELLOW
CREAT BLD-MCNC: 0.77 MG/DL (ref 0.57–1)
D-LACTATE SERPL-SCNC: 0.9 MMOL/L (ref 0.5–2)
DEPRECATED RDW RBC AUTO: 45.6 FL (ref 37–54)
EOSINOPHIL # BLD AUTO: 0 10*3/MM3 (ref 0–0.7)
EOSINOPHIL NFR BLD AUTO: 0 % (ref 0.3–6.2)
ERYTHROCYTE [DISTWIDTH] IN BLOOD BY AUTOMATED COUNT: 13.2 % (ref 11.7–13)
ERYTHROCYTE [DISTWIDTH] IN BLOOD BY AUTOMATED COUNT: 13.2 % (ref 4.5–15)
FLUAV AG NPH QL: NEGATIVE
FLUBV AG NPH QL IA: POSITIVE
GFR SERPL CREATININE-BSD FRML MDRD: 70 ML/MIN/1.73
GLOBULIN UR ELPH-MCNC: 2.4 GM/DL
GLUCOSE BLD-MCNC: 103 MG/DL (ref 65–99)
GLUCOSE BLDC GLUCOMTR-MCNC: 95 MG/DL (ref 70–130)
GLUCOSE UR STRIP-MCNC: NEGATIVE MG/DL
HCT VFR BLD AUTO: 33.3 % (ref 35.6–45.5)
HCT VFR BLD AUTO: 35.9 % (ref 31–42)
HGB BLD-MCNC: 11 G/DL (ref 11.9–15.5)
HGB BLD-MCNC: 11.7 G/DL (ref 12–18)
HGB UR QL STRIP.AUTO: NEGATIVE
IMM GRANULOCYTES # BLD: 0 10*3/MM3 (ref 0–0.03)
IMM GRANULOCYTES NFR BLD: 0 % (ref 0–0.5)
KETONES UR QL STRIP: ABNORMAL
LEUKOCYTE ESTERASE UR QL STRIP.AUTO: NEGATIVE
LYMPHOCYTES # BLD AUTO: 0.66 10*3/MM3 (ref 0.9–4.8)
LYMPHOCYTES # BLD AUTO: 0.9 10*3/MM3 (ref 1.2–3.4)
LYMPHOCYTES NFR BLD AUTO: 17.8 % (ref 19.6–45.3)
LYMPHOCYTES NFR BLD AUTO: 20.5 % (ref 21–51)
MCH RBC QN AUTO: 30.6 PG (ref 26.1–33.1)
MCH RBC QN AUTO: 31.2 PG (ref 26.9–32)
MCHC RBC AUTO-ENTMCNC: 32.7 G/DL (ref 33–37)
MCHC RBC AUTO-ENTMCNC: 33 G/DL (ref 32.4–36.3)
MCV RBC AUTO: 93.5 FL (ref 80–99)
MCV RBC AUTO: 94.3 FL (ref 80.5–98.2)
MONOCYTES # BLD AUTO: 0.3 10*3/MM3 (ref 0.1–0.6)
MONOCYTES # BLD AUTO: 0.53 10*3/MM3 (ref 0.2–1.2)
MONOCYTES NFR BLD AUTO: 14.3 % (ref 5–12)
MONOCYTES NFR BLD AUTO: 7.1 % (ref 2–9)
NEUTROPHILS # BLD AUTO: 2.51 10*3/MM3 (ref 1.9–8.1)
NEUTROPHILS # BLD AUTO: 3.3 10*3/MM3 (ref 1.4–6.5)
NEUTROPHILS NFR BLD AUTO: 67.6 % (ref 42.7–76)
NEUTROPHILS NFR BLD AUTO: 72.4 % (ref 42–75)
NITRITE UR QL STRIP: NEGATIVE
PH UR STRIP.AUTO: 5.5 [PH] (ref 5–8)
PLATELET # BLD AUTO: 211 10*3/MM3 (ref 140–500)
PLATELET # BLD AUTO: 219 10*3/MM3 (ref 150–450)
PMV BLD AUTO: 10.6 FL (ref 6–12)
PMV BLD AUTO: 8 FL (ref 7.1–10.5)
POTASSIUM BLD-SCNC: 4.3 MMOL/L (ref 3.5–5.2)
PROT SERPL-MCNC: 6.1 G/DL (ref 6–8.5)
PROT UR QL STRIP: ABNORMAL
RBC # BLD AUTO: 3.53 10*6/MM3 (ref 3.9–5.2)
RBC # BLD AUTO: 3.83 10*6/MM3 (ref 4–6)
SODIUM BLD-SCNC: 128 MMOL/L (ref 136–145)
SODIUM UR-SCNC: 85 MMOL/L
SP GR UR STRIP: 1.02 (ref 1–1.03)
TROPONIN T SERPL-MCNC: <0.01 NG/ML (ref 0–0.03)
UROBILINOGEN UR QL STRIP: ABNORMAL
WBC NRBC COR # BLD: 3.71 10*3/MM3 (ref 4.5–10.7)
WBC NRBC COR # BLD: 4.6 10*3/MM3 (ref 4.5–10)

## 2018-02-16 PROCEDURE — 99214 OFFICE O/P EST MOD 30 MIN: CPT | Performed by: NURSE PRACTITIONER

## 2018-02-16 PROCEDURE — 36415 COLL VENOUS BLD VENIPUNCTURE: CPT | Performed by: NURSE PRACTITIONER

## 2018-02-16 PROCEDURE — 93000 ELECTROCARDIOGRAM COMPLETE: CPT | Performed by: NURSE PRACTITIONER

## 2018-02-16 PROCEDURE — 87804 INFLUENZA ASSAY W/OPTIC: CPT | Performed by: NURSE PRACTITIONER

## 2018-02-16 PROCEDURE — 81003 URINALYSIS AUTO W/O SCOPE: CPT | Performed by: FAMILY MEDICINE

## 2018-02-16 PROCEDURE — 93005 ELECTROCARDIOGRAM TRACING: CPT | Performed by: FAMILY MEDICINE

## 2018-02-16 PROCEDURE — 85025 COMPLETE CBC W/AUTO DIFF WBC: CPT | Performed by: FAMILY MEDICINE

## 2018-02-16 PROCEDURE — 84484 ASSAY OF TROPONIN QUANT: CPT | Performed by: FAMILY MEDICINE

## 2018-02-16 PROCEDURE — 84300 ASSAY OF URINE SODIUM: CPT | Performed by: HOSPITALIST

## 2018-02-16 PROCEDURE — 83935 ASSAY OF URINE OSMOLALITY: CPT | Performed by: HOSPITALIST

## 2018-02-16 PROCEDURE — 83605 ASSAY OF LACTIC ACID: CPT | Performed by: FAMILY MEDICINE

## 2018-02-16 PROCEDURE — 71046 X-RAY EXAM CHEST 2 VIEWS: CPT

## 2018-02-16 PROCEDURE — 70450 CT HEAD/BRAIN W/O DYE: CPT

## 2018-02-16 PROCEDURE — 80053 COMPREHEN METABOLIC PANEL: CPT | Performed by: FAMILY MEDICINE

## 2018-02-16 PROCEDURE — 99285 EMERGENCY DEPT VISIT HI MDM: CPT

## 2018-02-16 PROCEDURE — 82962 GLUCOSE BLOOD TEST: CPT

## 2018-02-16 PROCEDURE — 93010 ELECTROCARDIOGRAM REPORT: CPT | Performed by: INTERNAL MEDICINE

## 2018-02-16 PROCEDURE — 85025 COMPLETE CBC W/AUTO DIFF WBC: CPT | Performed by: NURSE PRACTITIONER

## 2018-02-16 RX ORDER — ONDANSETRON 2 MG/ML
4 INJECTION INTRAMUSCULAR; INTRAVENOUS EVERY 4 HOURS PRN
Status: DISCONTINUED | OUTPATIENT
Start: 2018-02-16 | End: 2018-02-23 | Stop reason: HOSPADM

## 2018-02-16 RX ORDER — NITROGLYCERIN 0.4 MG/1
0.4 TABLET SUBLINGUAL
Status: DISCONTINUED | OUTPATIENT
Start: 2018-02-16 | End: 2018-02-23 | Stop reason: HOSPADM

## 2018-02-16 RX ORDER — DEXTROSE MONOHYDRATE 25 G/50ML
25 INJECTION, SOLUTION INTRAVENOUS
Status: DISCONTINUED | OUTPATIENT
Start: 2018-02-16 | End: 2018-02-23 | Stop reason: HOSPADM

## 2018-02-16 RX ORDER — SODIUM CHLORIDE 9 MG/ML
50 INJECTION, SOLUTION INTRAVENOUS CONTINUOUS
Status: DISCONTINUED | OUTPATIENT
Start: 2018-02-16 | End: 2018-02-23

## 2018-02-16 RX ORDER — ROSUVASTATIN CALCIUM 5 MG/1
5 TABLET, COATED ORAL DAILY
Status: DISCONTINUED | OUTPATIENT
Start: 2018-02-17 | End: 2018-02-23 | Stop reason: HOSPADM

## 2018-02-16 RX ORDER — AMLODIPINE BESYLATE 5 MG/1
5 TABLET ORAL DAILY
Status: DISCONTINUED | OUTPATIENT
Start: 2018-02-17 | End: 2018-02-23 | Stop reason: HOSPADM

## 2018-02-16 RX ORDER — PANTOPRAZOLE SODIUM 40 MG/1
40 TABLET, DELAYED RELEASE ORAL EVERY MORNING
Status: DISCONTINUED | OUTPATIENT
Start: 2018-02-17 | End: 2018-02-23 | Stop reason: HOSPADM

## 2018-02-16 RX ORDER — ERGOCALCIFEROL 1.25 MG/1
50000 CAPSULE ORAL
COMMUNITY
End: 2018-03-08

## 2018-02-16 RX ORDER — SODIUM CHLORIDE 9 MG/ML
125 INJECTION, SOLUTION INTRAVENOUS CONTINUOUS
Status: DISCONTINUED | OUTPATIENT
Start: 2018-02-16 | End: 2018-02-16

## 2018-02-16 RX ORDER — LISINOPRIL 40 MG/1
40 TABLET ORAL DAILY
Status: DISCONTINUED | OUTPATIENT
Start: 2018-02-17 | End: 2018-02-19

## 2018-02-16 RX ORDER — ACETAMINOPHEN 325 MG/1
650 TABLET ORAL EVERY 6 HOURS PRN
Status: DISCONTINUED | OUTPATIENT
Start: 2018-02-16 | End: 2018-02-23 | Stop reason: HOSPADM

## 2018-02-16 RX ORDER — OSELTAMIVIR PHOSPHATE 75 MG/1
75 CAPSULE ORAL 2 TIMES DAILY
Qty: 10 CAPSULE | Refills: 0 | Status: CANCELLED | OUTPATIENT
Start: 2018-02-16 | End: 2018-02-21

## 2018-02-16 RX ORDER — NICOTINE POLACRILEX 4 MG
15 LOZENGE BUCCAL
Status: DISCONTINUED | OUTPATIENT
Start: 2018-02-16 | End: 2018-02-23 | Stop reason: HOSPADM

## 2018-02-16 RX ADMIN — SODIUM CHLORIDE 250 ML: 9 INJECTION, SOLUTION INTRAVENOUS at 15:42

## 2018-02-16 RX ADMIN — SODIUM CHLORIDE 125 ML/HR: 9 INJECTION, SOLUTION INTRAVENOUS at 15:42

## 2018-02-16 NOTE — PROGRESS NOTES
Procedure     ECG 12 Lead  Date/Time: 2/16/2018 2:35 PM  Performed by: SYDNEY KRUEGER  Authorized by: SYDNEY KRUEGER   Comparison: compared with previous ECG from 11/30/2017  Comparison to previous ECG: NSR  Rhythm: sinus bradycardia  Ectopy: PVCs  Rate: bradycardic  T depression: III, V1, aVR and V3  QRS axis: normal  Clinical impression: abnormal ECG  Comments: Abnormal ekg, discussed with Dr. Hdz

## 2018-02-16 NOTE — ED PROVIDER NOTES
EMERGENCY DEPARTMENT ENCOUNTER    CHIEF COMPLAINT  Chief Complaint: potential seizure  History given by: Patient and family  History limited by: N/A  Room Number: 34/34  PMD: George Rock MD      HPI:  Pt is a 89 y.o. female who presents complaining of an episode PTA of generalized 'rigidity' (per family) which lasted for 3 minutes, followed by confusion and lethargy for the next 3 minutes.  Pt's family reports the episode occurred at the pt's PCP after being diagnosed with the flu. Patient was sitting and had no prodrome. Pt reports the onset of her flu sx occurred more than 48 hours ago and included diarrhea, nausea, fever, and cough, but denies emesis.  Pt reports her last episode of diarrhea was this morning, but her cough has improved.  Pt also c/o BLE weakness, stating she can barely walk she is so weak.  Pt's family reports pt has hx of vertigo and she has never passed out like this before.     Duration:  PTA  Onset: gradual  Timing: constant  Location: generalized  Radiation: none  Quality: rigidity  Intensity/Severity: moderate  Progression: resolved  Associated Symptoms: confusion, lethargy, BLE weakness, N/D, fever, cough  Aggravating Factors: none  Alleviating Factors: none  Previous Episodes: none  Treatment before arrival: Pt arrived via EMS.    PAST MEDICAL HISTORY  Active Ambulatory Problems     Diagnosis Date Noted   • Osteoarthritis of multiple joints 06/22/2016   • Hyperlipemia 06/22/2016   • Gastroesophageal reflux disease with esophagitis 06/22/2016   • B12 deficiency 06/22/2016   • Essential hypertension 12/16/2016   • Anemia 12/16/2016   • DM (diabetes mellitus), type 2 12/16/2016   • Heme positive stool 12/23/2016   • Ataxia 09/08/2017   • Hypercalcemia 09/19/2017   • Malignant neoplasm of female breast 09/27/2017   • Malignant neoplasm of central portion of left breast in female, estrogen receptor positive 10/23/2017     Resolved Ambulatory Problems     Diagnosis Date Noted   • Breast  nodule 09/19/2017     Past Medical History:   Diagnosis Date   • Anemia    • Arthritis    • Breast cancer    • Colon polyp    • Diabetes mellitus    • GERD (gastroesophageal reflux disease)    • H/O Cataract    • Hiatal hernia    • History of hepatitis 1958   • History of kidney stone    • History of peptic ulcer    • History of vertigo    • Hyperlipidemia    • Hypertension    • Scoliosis    • Unexplained weight loss    • Visual impairment        PAST SURGICAL HISTORY  Past Surgical History:   Procedure Laterality Date   • CATARACT EXTRACTION Bilateral 2008   • CATARACT EXTRACTION     • COLONOSCOPY W/ BIOPSIES     • DILATATION AND CURETTAGE     • EYE SURGERY      cataract extraction   • MASTECTOMY WITH SENTINEL NODE BIOPSY AND AXILLARY NODE DISSECTION Left 12/7/2017    Procedure:  left total mastectomy, left axillary sentinel lymph node biopsy x 3 ;  Surgeon: Madison Ponce MD;  Location: [facility] OR [facility] [place];  Service:    • SINUS SURGERY      CAUTERIZE A BLEED       FAMILY HISTORY  Family History   Problem Relation Age of Onset   • Uterine cancer Mother    • Coronary artery disease Mother    • Lung cancer Brother    • Heart attack Brother    • Heart disease Brother    • Malig Hyperthermia Neg Hx        SOCIAL HISTORY  Social History     Social History   • Marital status:      Spouse name: N/A   • Number of children: N/A   • Years of education: 6 months college     Occupational History   •  Retired     [employer]     Social History Main Topics   • Smoking status: Never Smoker   • Smokeless tobacco: Never Used   • Alcohol use No   • Drug use: No   • Sexual activity: Defer     Other Topics Concern   • Not on file     Social History Narrative   • No narrative on file       ALLERGIES  Contrast dye; Novocain [procaine]; Aleve [naproxen sodium]; Cortizone-10 [hydrocortisone]; Eye drops [naphazoline-polyethyl glycol]; Lipitor [atorvastatin]; Sulfur; Valium [diazepam]; Zocor [simvastatin]; and Zyrtec  [cetirizine]    REVIEW OF SYSTEMS  Review of Systems   Constitutional: Positive for fever.   HENT: Negative for sore throat.    Eyes: Negative.    Respiratory: Positive for cough. Negative for shortness of breath.    Cardiovascular: Negative for chest pain.   Gastrointestinal: Positive for diarrhea (last episode this morning) and nausea. Negative for abdominal pain and vomiting.   Genitourinary: Negative for dysuria.   Musculoskeletal: Negative for neck pain.   Skin: Negative for rash.   Allergic/Immunologic: Negative.    Neurological: Positive for seizures (3 min episode of rigidity) and weakness (BLE ). Negative for numbness and headaches.   Hematological: Negative.    Psychiatric/Behavioral: Positive for confusion (3 minutes after episode).   All other systems reviewed and are negative.      PHYSICAL EXAM  ED Triage Vitals   Temp Heart Rate Resp BP SpO2   02/16/18 1512 02/16/18 1507 02/16/18 1507 02/16/18 1507 02/16/18 1507   97.7 °F (36.5 °C) 58 22 124/61 92 %      Temp src Heart Rate Source Patient Position BP Location FiO2 (%)   02/16/18 1512 02/16/18 1507 -- -- --   Oral Monitor          Physical Exam   Constitutional: She is oriented to person, place, and time and well-developed, well-nourished, and in no distress. No distress.   HENT:   Head: Normocephalic and atraumatic.   Mouth/Throat: Mucous membranes are dry.   Eyes: EOM are normal. Pupils are equal, round, and reactive to light.   Neck: Normal range of motion. Neck supple.   Cardiovascular: Normal rate, regular rhythm and normal heart sounds.    Pulmonary/Chest: Effort normal and breath sounds normal. No respiratory distress.   Abdominal: Soft. There is no tenderness. There is no rebound and no guarding.   Musculoskeletal: Normal range of motion. She exhibits no edema.   Neurological: She is alert and oriented to person, place, and time. She has normal sensation and normal strength.   Skin: Skin is warm and dry. No rash noted.   Psychiatric: Mood and  affect normal.   Nursing note and vitals reviewed.      LAB RESULTS  Lab Results (last 24 hours)     Procedure Component Value Units Date/Time    Influenza Antigen, Rapid - Swab, Nasopharynx [[id number]]  (Abnormal) Collected:  02/16/18 1355    Specimen:  Swab from Nasopharynx Updated:  02/16/18 1405     Influenza A Ag, EIA Negative     Influenza B Ag, EIA Positive (A)    CBC & Differential [[id number]] Collected:  02/16/18 1408    Specimen:  Blood Updated:  02/16/18 1413    Narrative:       The following orders were created for panel order CBC & Differential.  Procedure                               Abnormality         Status                     ---------                               -----------         ------                     CBC Auto Differential[[id number]]        Abnormal            Final result                 Please view results for these tests on the individual orders.    CBC Auto Differential [[id number]]  (Abnormal) Collected:  02/16/18 1408    Specimen:  Blood Updated:  02/16/18 1413     WBC 4.60 10*3/mm3      RBC 3.83 (C) 10*6/mm3      Hemoglobin 11.7 (L) g/dL      Hematocrit 35.9 %      RDW 13.2 %      MCV 93.5 fL      MCH 30.6 pg      MCHC 32.7 (L) g/dL      MPV 8.0 fL      Platelets 219 10*3/mm3      Neutrophil % 72.4 %      Lymphocyte % 20.5 (L) %      Monocyte % 7.1 %      Neutrophils, Absolute 3.30 10*3/mm3      Lymphocytes, Absolute 0.90 (L) 10*3/mm3      Monocytes, Absolute 0.30 10*3/mm3     CBC & Differential [[id number]] Collected:  02/16/18 1541    Specimen:  Blood Updated:  02/16/18 1557    Narrative:       The following orders were created for panel order CBC & Differential.  Procedure                               Abnormality         Status                     ---------                               -----------         ------                     CBC Auto Differential[[id number]]        Abnormal            Final result                 Please view results for these tests on the individual  orders.    Comprehensive Metabolic Panel [[id number]]  (Abnormal) Collected:  02/16/18 1541    Specimen:  Blood Updated:  02/16/18 1612     Glucose 103 (H) mg/dL      BUN 13 mg/dL      Creatinine 0.77 mg/dL      Sodium 128 (L) mmol/L      Potassium 4.3 mmol/L      Chloride 91 (L) mmol/L      CO2 24.6 mmol/L      Calcium 8.8 mg/dL      Total Protein 6.1 g/dL      Albumin 3.70 g/dL      ALT (SGPT) 13 U/L      AST (SGOT) 23 U/L      Alkaline Phosphatase 59 U/L      Total Bilirubin 0.2 mg/dL      eGFR Non African Amer 70 mL/min/1.73      Globulin 2.4 gm/dL      A/G Ratio 1.5 g/dL      BUN/Creatinine Ratio 16.9     Anion Gap 12.4 mmol/L     Narrative:       The MDRD GFR formula is only valid for adults with stable renal function between ages 18 and 70.    Troponin [[id number]]  (Normal) Collected:  02/16/18 1541    Specimen:  Blood Updated:  02/16/18 1612     Troponin T <0.010 ng/mL     Narrative:       Troponin T Reference Ranges:  Less than 0.03 ng/mL:    Negative for AMI  0.03 to 0.09 ng/mL:      Indeterminant for AMI  Greater than 0.09 ng/mL: Positive for AMI    CBC Auto Differential [[id number]]  (Abnormal) Collected:  02/16/18 1541    Specimen:  Blood Updated:  02/16/18 1557     WBC 3.71 (L) 10*3/mm3      RBC 3.53 (L) 10*6/mm3      Hemoglobin 11.0 (L) g/dL      Hematocrit 33.3 (L) %      MCV 94.3 fL      MCH 31.2 pg      MCHC 33.0 g/dL      RDW 13.2 (H) %      RDW-SD 45.6 fl      MPV 10.6 fL      Platelets 211 10*3/mm3      Neutrophil % 67.6 %      Lymphocyte % 17.8 (L) %      Monocyte % 14.3 (H) %      Eosinophil % 0.0 (L) %      Basophil % 0.3 %      Immature Grans % 0.0 %      Neutrophils, Absolute 2.51 10*3/mm3      Lymphocytes, Absolute 0.66 (L) 10*3/mm3      Monocytes, Absolute 0.53 10*3/mm3      Eosinophils, Absolute 0.00 10*3/mm3      Basophils, Absolute 0.01 10*3/mm3      Immature Grans, Absolute 0.00 10*3/mm3     Urinalysis - Urine, Clean Catch [[id number]]  (Abnormal) Collected:  02/16/18 0836    Specimen:   Urine from Urine, Clean Catch Updated:  02/16/18 1607     Color, UA Yellow     Appearance, UA Clear     pH, UA 5.5     Specific Gravity, UA 1.022     Glucose, UA Negative     Ketones, UA Trace (A)     Bilirubin, UA Negative     Blood, UA Negative     Protein, UA 30 mg/dL (1+) (A)     Leuk Esterase, UA Negative     Nitrite, UA Negative     Urobilinogen, UA 0.2 E.U./dL          I ordered the above labs and reviewed the results    RADIOLOGY  CT Head Without Contrast   Final Result       No evidence for acute intracranial pathology. There is no convincing   evidence to suggest a seizure focus. However, further evaluation could   be performed with MR imaging for much more sensitive and specific   detection of a potential seizure focus if clinically indicated. These   findings and recommendations were directly discussed with Dr. Serrano on   02/16/2018 at approximately 4:18 PM.       Changes of inflammatory paranasal sinus disease are incidentally noted   and discussed in detail above.       Radiation dose reduction techniques were utilized, including automated   exposure control and exposure modulation based on body size.       This report was finalized on 2/16/2018 4:19 PM by Dr. Avel Miranda MD.          XR Chest 2 View    (Results Pending)    CT Head shows NAD.    I ordered the above noted radiological studies. Interpreted by radiologist. Discussed with radiologist. Reviewed by me in PACS.       PROCEDURES  Procedures    EKG           EKG time: 1539  Rhythm/Rate: SR, 57  P waves and [place]: normal  QRS, axis: normal   ST and T waves: nonspecific ST changes     Interpreted Contemporaneously by me, independently viewed  Unchanged compared to prior 11/30/17.    PROGRESS AND CONSULTS  ED Course     1534  Ordered IV fluid bolus for hydration.  Ordered CT Head, CMP, Troponin, UA, CBC, orthostatic vitals and EKG.    1755  Ordered CXR.   Ordered Lactic Acid.      1758  Rechecked pt, who is resting comfortably.  Pt has not eaten  all day and does not want food. Discussed pt's lab results which are consistent with flu or viral syndrome and CT shows NAD.  Discussed plan to admit the pt after her CXR results.  Pt understands and agrees with the plan, all questions answered.    Pt will be admitted through A.        MEDICAL DECISION MAKING  Results were reviewed/discussed with the patient and they were also made aware of online access. Pt also made aware that some labs, such as cultures, will not be resulted during ER visit and follow up with PMD is necessary.     MDM  Number of Diagnoses or Management Options  Influenza B:   Seizure, potential:      Amount and/or Complexity of Data Reviewed  Clinical lab tests: ordered and reviewed (Labs consistent with flu or virus.)  Tests in the radiology section of CPT®: ordered and reviewed (CT Head shows NAD.)  Tests in the medicine section of CPT®: ordered and reviewed (See EKG in procedure note.)           DIAGNOSIS  Final diagnoses:   Influenza B   Seizure, potential       DISPOSITION  ADMISSION    Discussed treatment plan and reason for admission with pt/family and admitting physician.  Pt/family voiced understanding of the plan for admission for further testing/treatment as needed.     Latest Documented Vital Signs:  As of 6:05 PM  BP- 153/61 HR- 55 Temp- 97.7 °F (36.5 °C) (Oral) O2 sat- 93%    --  Documentation assistance provided by latricia Barba for Dr. Serrano.  Information recorded by the [facility] was done at my direction and has been verified and validated by me.        Imani Barba  02/16/18 [id number]       Crow Serrano MD  02/16/18 1907       Crow Serrano MD  02/16/18 1906

## 2018-02-16 NOTE — PROGRESS NOTES
Subjective   Cristal Sams is a 89 y.o. female.     History of Present Illness   C/o cough, nausea, diarrhea, chills, sore throat, states symptoms started about 3 days ago. She has a non productive cough, she states cough is improved, she has been nauseated, diarrhea for about 2 days, she is able to eat but little appetite last night. She tried cough drops, her sister is also sick who lives with her. She has hx of breast cancer but no treatment. She did not get the flu vaccine. Her grandson has also been sick. She states she has been weak in legs and aches. She has chills, did not check temp at home, her niece is in the room with her today. She recently had mastectomy d/t breast cancer.   The following portions of the patient's history were reviewed and updated as appropriate: allergies, current medications, past family history, past medical history, past social history, past surgical history and problem list.    Review of Systems   Constitutional: Positive for chills, diaphoresis and fever.   HENT: Positive for congestion, sinus pressure and sore throat. Negative for ear pain and rhinorrhea.    Respiratory: Positive for cough. Negative for shortness of breath and wheezing.    Cardiovascular: Negative for chest pain.   Gastrointestinal: Positive for diarrhea. Negative for abdominal pain, nausea and vomiting.   Musculoskeletal: Positive for myalgias. Negative for arthralgias.   Skin: Negative for pallor.   Neurological: Negative for dizziness, light-headedness and headaches.   All other systems reviewed and are negative.      Objective   Physical Exam   Constitutional: She is oriented to person, place, and time. She appears well-developed and well-nourished.   HENT:   Head: Normocephalic.   Eyes: Pupils are equal, round, and reactive to light.   Neck: Neck supple.   Cardiovascular: Normal rate, regular rhythm and normal heart sounds.    Pulmonary/Chest: Effort normal and breath sounds normal. No respiratory  distress. She has no decreased breath sounds. She has no wheezes. She has no rhonchi.   Musculoskeletal: Normal range of motion.   Neurological: She is alert and oriented to person, place, and time.   Skin: Skin is warm and dry.   Psychiatric: She has a normal mood and affect. Her behavior is normal. Judgment and thought content normal.   Nursing note and vitals reviewed.  exam initially prior to seizure like event.    Approximately 2:10pm while in exam room, witness seizure like actvity, in exam room and patient became unresponsive, breathing noted, unsure if hit head on sink console, patient breathing noted, awoke shortly after approx 1-2 min, , /60, Hr 45 Sa02 98%, no asymmetry noted, EKG showed questionable changes compared to old EKG, normal breath sounds, patient alert and somewhat awake, EMS called and patient taken to [facility] per EMS at that time.       Assessment/Plan   Cristal was seen today for flu symptoms.    Diagnoses and all orders for this visit:    Flu-like symptoms  -     Influenza Antigen, Rapid - Swab, Nasopharynx  -     CBC & Differential  -     CBC Auto Differential    Cough  -     CBC & Differential  -     CBC Auto Differential    Influenza B    Syncope, unspecified syncope type    Seizure-like activity    Other orders  -     Cancel: oseltamivir (TAMIFLU) 75 MG capsule; Take 1 capsule by mouth 2 (Two) Times a Day for 5 days.

## 2018-02-16 NOTE — ED TRIAGE NOTES
Pt was being seen at her PCP office today for n/v/d x2 days and tested positive for Influenza B. EMS reports after patient was swabbed for flu she became sick to her stomach and slumped down in the wheelchair, passing out for two minutes. Pt a&xo4 upon arrival to ED, c/o body aches, n/v/d. Pt denies SOA

## 2018-02-17 ENCOUNTER — APPOINTMENT (OUTPATIENT)
Dept: MRI IMAGING | Facility: HOSPITAL | Age: 83
End: 2018-02-17

## 2018-02-17 PROBLEM — R09.02 HYPOXIA: Status: ACTIVE | Noted: 2018-02-17

## 2018-02-17 PROBLEM — R56.9 CONVULSIONS (HCC): Status: ACTIVE | Noted: 2018-02-17

## 2018-02-17 PROBLEM — R00.1 BRADYCARDIA: Status: ACTIVE | Noted: 2018-02-17

## 2018-02-17 LAB
ANION GAP SERPL CALCULATED.3IONS-SCNC: 11.6 MMOL/L
BUN BLD-MCNC: 11 MG/DL (ref 8–23)
BUN/CREAT SERPL: 18 (ref 7–25)
CALCIUM SPEC-SCNC: 8.3 MG/DL (ref 8.2–9.6)
CHLORIDE SERPL-SCNC: 96 MMOL/L (ref 98–107)
CO2 SERPL-SCNC: 23.4 MMOL/L (ref 22–29)
CREAT BLD-MCNC: 0.61 MG/DL (ref 0.57–1)
DEPRECATED RDW RBC AUTO: 45.2 FL (ref 37–54)
ERYTHROCYTE [DISTWIDTH] IN BLOOD BY AUTOMATED COUNT: 13.1 % (ref 11.7–13)
GFR SERPL CREATININE-BSD FRML MDRD: 92 ML/MIN/1.73
GLUCOSE BLD-MCNC: 87 MG/DL (ref 65–99)
GLUCOSE BLDC GLUCOMTR-MCNC: 85 MG/DL (ref 70–130)
GLUCOSE BLDC GLUCOMTR-MCNC: 91 MG/DL (ref 70–130)
GLUCOSE BLDC GLUCOMTR-MCNC: 93 MG/DL (ref 70–130)
GLUCOSE BLDC GLUCOMTR-MCNC: 97 MG/DL (ref 70–130)
HBA1C MFR BLD: 5.1 % (ref 4.8–5.6)
HCT VFR BLD AUTO: 33.2 % (ref 35.6–45.5)
HGB BLD-MCNC: 10.8 G/DL (ref 11.9–15.5)
MCH RBC QN AUTO: 30.9 PG (ref 26.9–32)
MCHC RBC AUTO-ENTMCNC: 32.5 G/DL (ref 32.4–36.3)
MCV RBC AUTO: 94.9 FL (ref 80.5–98.2)
PLATELET # BLD AUTO: 188 10*3/MM3 (ref 140–500)
PMV BLD AUTO: 10.4 FL (ref 6–12)
POTASSIUM BLD-SCNC: 3.9 MMOL/L (ref 3.5–5.2)
RBC # BLD AUTO: 3.5 10*6/MM3 (ref 3.9–5.2)
SODIUM BLD-SCNC: 131 MMOL/L (ref 136–145)
TSH SERPL DL<=0.05 MIU/L-ACNC: 1.74 MIU/ML (ref 0.27–4.2)
URATE SERPL-MCNC: 2.9 MG/DL (ref 2.4–5.7)
WBC NRBC COR # BLD: 3.28 10*3/MM3 (ref 4.5–10.7)

## 2018-02-17 PROCEDURE — 80048 BASIC METABOLIC PNL TOTAL CA: CPT | Performed by: HOSPITALIST

## 2018-02-17 PROCEDURE — 70553 MRI BRAIN STEM W/O & W/DYE: CPT

## 2018-02-17 PROCEDURE — 85027 COMPLETE CBC AUTOMATED: CPT | Performed by: HOSPITALIST

## 2018-02-17 PROCEDURE — 93005 ELECTROCARDIOGRAM TRACING: CPT | Performed by: INTERNAL MEDICINE

## 2018-02-17 PROCEDURE — 93010 ELECTROCARDIOGRAM REPORT: CPT | Performed by: INTERNAL MEDICINE

## 2018-02-17 PROCEDURE — 82962 GLUCOSE BLOOD TEST: CPT

## 2018-02-17 PROCEDURE — 99221 1ST HOSP IP/OBS SF/LOW 40: CPT | Performed by: INTERNAL MEDICINE

## 2018-02-17 PROCEDURE — 83036 HEMOGLOBIN GLYCOSYLATED A1C: CPT | Performed by: HOSPITALIST

## 2018-02-17 PROCEDURE — 84550 ASSAY OF BLOOD/URIC ACID: CPT | Performed by: HOSPITALIST

## 2018-02-17 PROCEDURE — 0 GADOBENATE DIMEGLUMINE 529 MG/ML SOLUTION: Performed by: HOSPITALIST

## 2018-02-17 PROCEDURE — A9577 INJ MULTIHANCE: HCPCS | Performed by: HOSPITALIST

## 2018-02-17 PROCEDURE — 25010000002 ONDANSETRON PER 1 MG: Performed by: HOSPITALIST

## 2018-02-17 PROCEDURE — 99222 1ST HOSP IP/OBS MODERATE 55: CPT | Performed by: PSYCHIATRY & NEUROLOGY

## 2018-02-17 PROCEDURE — 84443 ASSAY THYROID STIM HORMONE: CPT | Performed by: HOSPITALIST

## 2018-02-17 PROCEDURE — 94640 AIRWAY INHALATION TREATMENT: CPT

## 2018-02-17 RX ORDER — BENZONATATE 100 MG/1
100 CAPSULE ORAL 3 TIMES DAILY PRN
Status: DISCONTINUED | OUTPATIENT
Start: 2018-02-17 | End: 2018-02-23 | Stop reason: HOSPADM

## 2018-02-17 RX ORDER — ALBUTEROL SULFATE 2.5 MG/3ML
2.5 SOLUTION RESPIRATORY (INHALATION)
Status: DISCONTINUED | OUTPATIENT
Start: 2018-02-17 | End: 2018-02-23 | Stop reason: HOSPADM

## 2018-02-17 RX ADMIN — PANTOPRAZOLE SODIUM 40 MG: 40 TABLET, DELAYED RELEASE ORAL at 09:21

## 2018-02-17 RX ADMIN — ALBUTEROL SULFATE 2.5 MG: 2.5 SOLUTION RESPIRATORY (INHALATION) at 16:39

## 2018-02-17 RX ADMIN — AMLODIPINE BESYLATE 5 MG: 5 TABLET ORAL at 09:21

## 2018-02-17 RX ADMIN — SODIUM CHLORIDE 100 ML/HR: 9 INJECTION, SOLUTION INTRAVENOUS at 01:52

## 2018-02-17 RX ADMIN — GADOBENATE DIMEGLUMINE 8 ML: 529 INJECTION, SOLUTION INTRAVENOUS at 08:34

## 2018-02-17 RX ADMIN — LISINOPRIL 40 MG: 40 TABLET ORAL at 09:21

## 2018-02-17 RX ADMIN — SODIUM CHLORIDE 100 ML/HR: 9 INJECTION, SOLUTION INTRAVENOUS at 23:14

## 2018-02-17 RX ADMIN — ROSUVASTATIN CALCIUM 5 MG: 5 TABLET, FILM COATED ORAL at 09:21

## 2018-02-17 RX ADMIN — ONDANSETRON 4 MG: 2 INJECTION INTRAMUSCULAR; INTRAVENOUS at 19:18

## 2018-02-17 RX ADMIN — SODIUM CHLORIDE 100 ML/HR: 9 INJECTION, SOLUTION INTRAVENOUS at 13:35

## 2018-02-17 NOTE — PLAN OF CARE
Problem: Patient Care Overview (Adult)  Goal: Plan of Care Review  Outcome: Ongoing (interventions implemented as appropriate)   02/17/18 1606   Coping/Psychosocial Response Interventions   Plan Of Care Reviewed With patient   Patient Care Overview   Progress improving   Outcome Evaluation   Outcome Summary/Follow up Plan VSS. Telemetry monitor, SR/SB. Cardiology consulted, ECHO ordered. IV fluids. Up with assist, falls precautions. Will continue to monitor.        Problem: Fall Risk (Adult)  Goal: Absence of Falls  Outcome: Ongoing (interventions implemented as appropriate)      Problem: Respiratory Insufficiency (Adult)  Goal: Acid/Base Balance  Outcome: Ongoing (interventions implemented as appropriate)    Goal: Effective Ventilation  Outcome: Ongoing (interventions implemented as appropriate)

## 2018-02-17 NOTE — CONSULTS
"  Patient Identification:  NAME:  Cristal Sams  Age:  89 y.o.   Sex:  female   :  1927   MRN:  [id number]       Chief complaint: I think I passed out, reason for consult syncope    History of present illness:  This patient is a 89-year-old right-handed white female with history of breast cancer status post mastectomy history of hepatitis vertigo hypertension who was at her doctor's office when she did feel hot and sweaty and nauseated she had a loss of consciousness right after they \"put something up my nose that ran down my throat\".  She was witnessed as she was sitting in a chair that her eyes rolled back and she had some rhythmic back and forth movement of her head she has not had seizures by report but states that she did nearly passed out a few years back and had to lie down on the floor and it seemed to pass.  This episode is described as noted no associated symptoms no modifying factors quality was as described.  Context is a patient who has a heart rate in the low 60s at rest she has had an MRI scan and by my independent eyeball review is normal      Past medical history:  Past Medical History:   Diagnosis Date   • Anemia    • Arthritis    • Breast cancer     LEFT   • Colon polyp    • Diabetes mellitus     DIET CONTROLLED   • GERD (gastroesophageal reflux disease)    • H/O Cataract    • Hiatal hernia    • History of hepatitis    • History of kidney stone    • History of peptic ulcer    • History of vertigo    • Hyperlipidemia    • Hypertension    • Scoliosis    • Unexplained weight loss     #43 lb in 3 months    • Visual impairment        Past surgical history:  Past Surgical History:   Procedure Laterality Date   • CATARACT EXTRACTION Bilateral    • CATARACT EXTRACTION     • COLONOSCOPY W/ BIOPSIES     • DILATATION AND CURETTAGE     • EYE SURGERY      cataract extraction   • MASTECTOMY WITH SENTINEL NODE BIOPSY AND AXILLARY NODE DISSECTION Left 2017    Procedure:  left total " mastectomy, left axillary sentinel lymph node biopsy x 3 ;  Surgeon: Madison Ponce MD;  [place] [facility] [place] [facility] [place]:    • SINUS SURGERY      CAUTERIZE A BLEED       Allergies:  Contrast dye; Novocain [procaine]; Aleve [naproxen sodium]; Cortizone-10 [hydrocortisone]; Eye drops [naphazoline-polyethyl glycol]; Lipitor [atorvastatin]; Sulfur; Valium [diazepam]; Zocor [simvastatin]; and Zyrtec [cetirizine]    Home medications:  Facility-Administered Medications Prior to Admission   Medication Dose Route Frequency Provider Last Rate Last Dose   • [DISCONTINUED] cyanocobalamin injection 1,000 mcg  1,000 mcg Intramuscular Q28 Days George Rock MD   1,000 mcg at 09/08/17 1219     Prescriptions Prior to Admission   Medication Sig Dispense Refill Last Dose   • amLODIPine (NORVASC) 5 MG tablet Take 1 tablet by mouth Daily. (Patient taking differently: Take 5 mg by mouth Daily As Needed.) 30 tablet 3 Taking   • esomeprazole (nexIUM) 40 MG capsule Take 40 mg by mouth Every Morning Before Breakfast.   Taking   • lisinopril (PRINIVIL,ZESTRIL) 40 MG tablet Take 40 mg by mouth Daily.   2/15/2018 at Unknown time   • rosuvastatin (CRESTOR) 5 MG tablet Take 5 mg by mouth Daily.   2/14/2018 at Unknown time   • vitamin D (ERGOCALCIFEROL) 39313 units capsule capsule Take 50,000 Units by mouth Every 14 (Fourteen) Days. Sunday, Wednesday 2/14/2018   • glucose blood (ONE TOUCH ULTRA TEST) test strip Test Blood Sugar Once Daily 100 each 2 Taking        Hospital medications:    amLODIPine 5 mg Oral Daily   insulin aspart 0-7 Units Subcutaneous 4x Daily With Meals & Nightly   lisinopril 40 mg Oral Daily   pantoprazole 40 mg Oral QAM   rosuvastatin 5 mg Oral Daily       sodium chloride 100 mL/hr Last Rate: 100 mL/hr (02/17/18 0152)     •  acetaminophen  •  benzonatate  •  dextrose  •  dextrose  •  glucagon (human recombinant)  •  nitroglycerin  •  ondansetron    Family history:  Family History   Problem Relation Age of Onset    • Uterine cancer Mother    • Coronary artery disease Mother    • Lung cancer Brother    • Heart attack Brother    • Heart disease Brother    • Malig Hyperthermia Neg Hx        Social history:  Social History   Substance Use Topics   • Smoking status: Never Smoker   • Smokeless tobacco: Never Used   • Alcohol use No       Review of systems:    She feels fine no hair eyes nose throat skin bone joint  lymphatic hematologic or oncologic complaints no neck pain chest pain abdominal pain bowel bladder incontinence fever chills or rash    Objective:  Vitals Ranges:   Temp:  [97.7 °F (36.5 °C)-99.4 °F (37.4 °C)] 99.4 °F (37.4 °C)  Heart Rate:  [55-74] 72  Resp:  [16-22] 18  BP: (100-163)/(56-79) 131/79      Physical Exam:  Awake alert oriented ×3 in no distress fund of knowledge good attention span and concentration good recent and remote memory fair language function normal well-developed well-nourished in no distress pupils 1-1/2 constricting to 1 normal disc retinas visual fields extraocular movements full without nystagmus nasolabial folds palate tongue symmetrical normal hearing facial sensation head turning shoulder shrug motor 5 minus out of 5 times for some distal atrophy no rigidity or bradykinesia reflexes trace throughout symmetrical toes downgoing bilaterally sensation normal light touch face both arms both legs coordination normal in the upper extremity station and gait was not tested at this time for fear of would let her stand up and pass out or fall heart regular without murmur neck supple without bruits extremities no clubbing cyanosis edema visual acuity normal at 3 feet    Results review:   I reviewed the patient's new clinical results.    Data review:  Lab Results (last 24 hours)     Procedure Component Value Units Date/Time    CBC & Differential [[id number]] Collected:  02/16/18 1541    Specimen:  Blood Updated:  02/16/18 3097    Narrative:       The following orders were created for panel order CBC  & Differential.  Procedure                               Abnormality         Status                     ---------                               -----------         ------                     CBC Auto Differential[[id number]]        Abnormal            Final result                 Please view results for these tests on the individual orders.    CBC Auto Differential [[id number]]  (Abnormal) Collected:  02/16/18 1541    Specimen:  Blood Updated:  02/16/18 1557     WBC 3.71 (L) 10*3/mm3      RBC 3.53 (L) 10*6/mm3      Hemoglobin 11.0 (L) g/dL      Hematocrit 33.3 (L) %      MCV 94.3 fL      MCH 31.2 pg      MCHC 33.0 g/dL      RDW 13.2 (H) %      RDW-SD 45.6 fl      MPV 10.6 fL      Platelets 211 10*3/mm3      Neutrophil % 67.6 %      Lymphocyte % 17.8 (L) %      Monocyte % 14.3 (H) %      Eosinophil % 0.0 (L) %      Basophil % 0.3 %      Immature Grans % 0.0 %      Neutrophils, Absolute 2.51 10*3/mm3      Lymphocytes, Absolute 0.66 (L) 10*3/mm3      Monocytes, Absolute 0.53 10*3/mm3      Eosinophils, Absolute 0.00 10*3/mm3      Basophils, Absolute 0.01 10*3/mm3      Immature Grans, Absolute 0.00 10*3/mm3     Urinalysis - Urine, Clean Catch [[id number]]  (Abnormal) Collected:  02/16/18 1553    Specimen:  Urine from Urine, Clean Catch Updated:  02/16/18 1607     Color, UA Yellow     Appearance, UA Clear     pH, UA 5.5     Specific Gravity, UA 1.022     Glucose, UA Negative     Ketones, UA Trace (A)     Bilirubin, UA Negative     Blood, UA Negative     Protein, UA 30 mg/dL (1+) (A)     Leuk Esterase, UA Negative     Nitrite, UA Negative     Urobilinogen, UA 0.2 E.U./dL    Comprehensive Metabolic Panel [[id number]]  (Abnormal) Collected:  02/16/18 1541    Specimen:  Blood Updated:  02/16/18 1612     Glucose 103 (H) mg/dL      BUN 13 mg/dL      Creatinine 0.77 mg/dL      Sodium 128 (L) mmol/L      Potassium 4.3 mmol/L      Chloride 91 (L) mmol/L      CO2 24.6 mmol/L      Calcium 8.8 mg/dL      Total Protein 6.1 g/dL       Albumin 3.70 g/dL      ALT (SGPT) 13 U/L      AST (SGOT) 23 U/L      Alkaline Phosphatase 59 U/L      Total Bilirubin 0.2 mg/dL      eGFR Non African Amer 70 mL/min/1.73      Globulin 2.4 gm/dL      A/G Ratio 1.5 g/dL      BUN/Creatinine Ratio 16.9     Anion Gap 12.4 mmol/L     Narrative:       The MDRD GFR formula is only valid for adults with stable renal function between ages 18 and 70.    Troponin [[id number]]  (Normal) Collected:  02/16/18 1541    Specimen:  Blood Updated:  02/16/18 1612     Troponin T <0.010 ng/mL     Narrative:       Troponin T Reference Ranges:  Less than 0.03 ng/mL:    Negative for AMI  0.03 to 0.09 ng/mL:      Indeterminant for AMI  Greater than 0.09 ng/mL: Positive for AMI    Lactic Acid, Plasma [[id number]]  (Normal) Collected:  02/16/18 1807    Specimen:  Blood Updated:  02/16/18 1827     Lactate 0.9 mmol/L     POC Glucose Once [[id number]]  (Normal) Collected:  02/16/18 2117    Specimen:  Blood Updated:  02/16/18 2120     Glucose 95 mg/dL     Narrative:       Meter: [id number] : [id number] Sonido RAY    URINE OSMOLALITY - Miscellaneous Test [[id number]] Collected:  02/16/18 2202    Specimen:  Urine Updated:  02/16/18 2245    Sodium, Urine, Random - Urine, Clean Catch [[id number]] Collected:  02/16/18 2202    Specimen:  Urine from Urine, Clean Catch Updated:  02/16/18 2245     Sodium, Urine 85 mmol/L     Narrative:       Reference intervals for random urine have not been established.  Clinical usage is dependent upon physician's interpretation in combination with other laboratory tests.     CBC (No Diff) [[id number]]  (Abnormal) Collected:  02/17/18 0446    Specimen:  Blood Updated:  02/17/18 0516     WBC 3.28 (L) 10*3/mm3      RBC 3.50 (L) 10*6/mm3      Hemoglobin 10.8 (L) g/dL      Hematocrit 33.2 (L) %      MCV 94.9 fL      MCH 30.9 pg      MCHC 32.5 g/dL      RDW 13.1 (H) %      RDW-SD 45.2 fl      MPV 10.4 fL      Platelets 188 10*3/mm3     Hemoglobin A1c [[id number]]   (Normal) Collected:  02/17/18 0446    Specimen:  Blood Updated:  02/17/18 0527     Hemoglobin A1C 5.10 %     Narrative:       Hemoglobin A1C Ranges:    Increased Risk for Diabetes  5.7% to 6.4%  Diabetes                     >= 6.5%  Diabetic Goal                < 7.0%    Basic Metabolic Panel [[id number]]  (Abnormal) Collected:  02/17/18 0446    Specimen:  Blood Updated:  02/17/18 0541     Glucose 87 mg/dL      BUN 11 mg/dL      Creatinine 0.61 mg/dL      Sodium 131 (L) mmol/L      Potassium 3.9 mmol/L      Chloride 96 (L) mmol/L      CO2 23.4 mmol/L      Calcium 8.3 mg/dL      eGFR Non African Amer 92 mL/min/1.73      BUN/Creatinine Ratio 18.0     Anion Gap 11.6 mmol/L     Narrative:       The MDRD GFR formula is only valid for adults with stable renal function between ages 18 and 70.    Uric Acid [[id number]]  (Normal) Collected:  02/17/18 0446    Specimen:  Blood Updated:  02/17/18 0541     Uric Acid 2.9 mg/dL     TSH [[id number]]  (Normal) Collected:  02/17/18 0446    Specimen:  Blood Updated:  02/17/18 0551     TSH 1.740 mIU/mL     POC Glucose Once [[id number]]  (Normal) Collected:  02/17/18 0708    Specimen:  Blood Updated:  02/17/18 0710     Glucose 93 mg/dL     Narrative:       Meter: [id number] : [id number] Bautista Rachael FLOR    POC Glucose Once [[id number]]  (Normal) Collected:  02/17/18 1108    Specimen:  Blood Updated:  02/17/18 1109     Glucose 97 mg/dL     Narrative:       Meter: [id number] : [id number] Bautista Rachael FLOR           Imaging:  Imaging Results (last 24 hours)     Procedure Component Value Units Date/Time    CT Head Without Contrast [[id number]] Collected:  02/16/18 1612     Updated:  02/16/18 1622    Narrative:       CT HEAD WITHOUT CONTRAST     CLINICAL HISTORY: Syncope versus seizure.     CT scan of the head was obtained with 3 mm axial images. No intravenous  contrast was administered.     FINDINGS:     The ventricles, sulci, and cisterns are age appropriate. There are  mild-to-moderate  changes of chronic small vessel ischemic phenomena.  There is either an enlarged perivascular space or chronic infarct within  the right aspect of the midbrain measuring up to 3 mm in diameter.  Otherwise, the posterior fossa structures are within normal limits.     The extracranial structures are incidentally notable for mucosal  thickening within the sphenoid sinus. There is a mucous retention cyst  within the right aspect of the sphenoid sinus.       Impression:          No evidence for acute intracranial pathology. There is no convincing  evidence to suggest a seizure focus. However, further evaluation could  be performed with MR imaging for much more sensitive and specific  detection of a potential seizure focus if clinically indicated. These  findings and recommendations were directly discussed with Dr. Serrano on  02/16/2018 at approximately 4:18 PM.     Changes of inflammatory paranasal sinus disease are incidentally noted  and discussed in detail above.     Radiation dose reduction techniques were utilized, including automated  exposure control and exposure modulation based on body size.     This report was finalized on 2/16/2018 4:19 PM by Dr. Avel Miranda MD.       MRI Brain With & Without Contrast [[id number]] Collected:  02/17/18 0900     Updated:  02/17/18 0919    Narrative:       MRI BRAIN W WO CONTRAST-     INDICATIONS: Seizures     TECHNIQUE: Multiplanar multisequence magnetic resonance imaging of the  brain, without and with intravenous contrast material.     COMPARISON: Head CT from 02/16/2018     FINDINGS:     The diffusion-weighted images show no restricted diffusion to suggest  acute infarct. Small encephalomalacia/old infarct change is apparent  laterally adjacent to the posterior horn of the left lateral ventricle.        The midline structures appear unremarkable.     The brain parenchyma shows moderate periventricular white matter T2  FLAIR signal hyperintensity suggesting chronic small  vessel ischemic  change in a patient this age. A few scattered small foci of  susceptibility artifact in the bilateral cerebrum, nonspecific, could  for example be evidence of amyloid angiopathy or sequela of old  hypertensive microhemorrhages. No enhancing lesions of brain are  identified. Vascular flow related artifact is present on the  postcontrast images.     Flow voids in the major arteries at the base of the brain appear  unremarkable.     Partial opacification of the sphenoid sinus. The paranasal sinuses,  mastoid air cells, and orbits otherwise appear unremarkable.       Impression:       No acute infarct. No enhancing lesion in brain. Chronic-appearing  changes. Follow-up as indications persist.           This report was finalized on 2/17/2018 9:15 AM by Dr. Levon Grissom MD.       XR Chest 2 View [[id number]] Collected:  02/16/18 2113     Updated:  02/17/18 1153    Narrative:       EMERGENCY PA AND LATERAL CHEST X-RAY 02/16/2018     CLINICAL HISTORY: Cough, diagnosed with flu today.      COMPARISON: Correlated to prior chest CT 01/26/2018.     FINDINGS: There are clips in the left axilla from a previous left  axillary dissection. There has been a previous left mastectomy. The  cardiomediastinal silhouette and pulmonary vasculature are within normal  limits. There are scattered tiny noncalcified and calcified nodules in  the lungs likely granulomas. The remainder of the lungs are clear. The  costophrenic angles are sharp.       Impression:          1. No change when compared to chest CT from [facility] on 01/26/2018. No definite active disease is seen in the  chest. There has been a previous left mastectomy and left axillary  dissection.     This report was finalized on 2/17/2018 11:50 AM by Dr. George Balbuena MD.                Assessment and Plan:     Principal Problem:    Syncope  Active Problems:    Essential hypertension    DM (diabetes mellitus), type 2    Influenza B     Hyponatremia    Hypoxia    Bradycardia    Convulsions    Syncope with syncopal seizures.  This patient has had a similar episode before, both preceded by nausea etc.  And at that time she was able to relieve it by lying flat.  Her bradycardia is noted as well as an probably plays a role additionally.  I do not see evidence of primary seizure disorder stroke or TIA and would not recommend further neurologic workup I did review the MRI scan and it is normal with no evidence of acute stroke or metastatic disease thank you      Bg Garcia MD  02/17/18  12:28 PM

## 2018-02-17 NOTE — ED NOTES
Tried calling report, RN unavailable, will call back in 5min per request.      Rita Alvarez, HAYDER  02/16/18 1955

## 2018-02-17 NOTE — H&P
"    Name: Cristal Sams ADMIT: 2018   : 1927  PCP: George Rock MD    MRN: [id number] LOS: 0 days   AGE/SEX: 89 y.o. female  ROOM:      Chief Complaint   Patient presents with   • Vomiting   • Syncope       Subjective   Ms. Sams is a 89 y.o. female with a history of diet controlled diabetes, breast cancer and HTN that presents to [facility] following an apparent syncopal episode at her doctor's office. This was witnessed by family members. They state she was sitting in a chair when she started rolling her eyes backward, leaning back in the chair with rhythmic back-and-forth movement of her head. She eventually \"slammed\" her head into the wall behind her and appeared to have lost consciousness. Lasted for about 3 minutes and she was slightly disoriented thereafter. There is no urinary or fecal incontinence. No tongue biting. No history of seizures. No reported prodrome. She is seeing her primary care doctor for a flulike illness and actually tested positive for influenza B in their office. Symptoms started on Wednesday. She's had fever, chills, weakness, diarrhea and minimal cough. She has had vertigo in the past but no reported syncope.      History of Present Illness    Past Medical History:   Diagnosis Date   • Anemia    • Arthritis    • Breast cancer     LEFT   • Colon polyp    • Diabetes mellitus     DIET CONTROLLED   • GERD (gastroesophageal reflux disease)    • H/O Cataract    • Hiatal hernia    • History of hepatitis    • History of kidney stone    • History of peptic ulcer    • History of vertigo    • Hyperlipidemia    • Hypertension    • Scoliosis    • Unexplained weight loss     #43 lb in 3 months    • Visual impairment      Past Surgical History:   Procedure Laterality Date   • CATARACT EXTRACTION Bilateral    • CATARACT EXTRACTION     • COLONOSCOPY W/ BIOPSIES     • DILATATION AND CURETTAGE     • EYE SURGERY      cataract extraction   • MASTECTOMY " WITH SENTINEL NODE BIOPSY AND AXILLARY NODE DISSECTION Left 12/7/2017    Procedure:  left total mastectomy, left axillary sentinel lymph node biopsy x 3 ;  Surgeon: Madison Ponce MD;  Location: [facility] [place];  Service:    • SINUS SURGERY      CAUTERIZE A BLEED     Family History   Problem Relation Age of Onset   • Uterine cancer Mother    • Coronary artery disease Mother    • Lung cancer Brother    • Heart attack Brother    • Heart disease Brother    • Malig Hyperthermia Neg Hx      Social History   Substance Use Topics   • Smoking status: Never Smoker   • Smokeless tobacco: Never Used   • Alcohol use No       (Not in a hospital admission)  Allergies:    Allergies   Allergen Reactions   • Contrast Dye    • Novocain [Procaine] Palpitations and Parasthesia     LEG GET NUMB   • Aleve [Naproxen Sodium] GI Intolerance   • Cortizone-10 [Hydrocortisone] Other (See Comments)     UNSURE   • Eye Drops [Naphazoline-Polyethyl Glycol] Other (See Comments)     UNSURE   • Lipitor [Atorvastatin] Other (See Comments)     UNSURE   • Sulfur Other (See Comments)     UNSURE   • Valium [Diazepam] Other (See Comments)     Doesn't like the way it makes her feel   • Zocor [Simvastatin] Other (See Comments)     UNSURE   • Zyrtec [Cetirizine] Other (See Comments)     UNSURE       Review of Systems   Constitutional: Positive for chills, fatigue and fever.   HENT: Positive for congestion.    Eyes: Negative.    Respiratory: Positive for cough. Negative for shortness of breath.    Cardiovascular: Negative.    Gastrointestinal: Positive for abdominal pain, diarrhea and nausea. Negative for vomiting.   Endocrine: Negative.    Genitourinary: Negative.    Musculoskeletal: Positive for gait problem (Due to leg weakness) and myalgias.   Skin: Negative.    Neurological: Positive for weakness.   Hematological: Negative.    Psychiatric/Behavioral: Negative.         Objective    Vital Signs  Temp:  [97.7 °F (36.5 °C)-98.6 °F (37 °C)] 97.7 °F (36.5  °C)  Heart Rate:  [55-69] 60  Resp:  [16-22] 18  BP: (100-157)/(58-70) 152/70  SpO2:  [92 %-98 %] 98 %  on   ;   O2 Device: room air  Body mass index is 20.27 kg/(m^2).    Physical Exam   Constitutional: She is oriented to person, place, and time. No distress.   Frail, elderly, poor muscle mass   HENT:   Head: Normocephalic and atraumatic.   Mouth/Throat: Mucous membranes are dry.   Eyes: Conjunctivae and EOM are normal. No scleral icterus.   Neck: Normal range of motion. Neck supple. No tracheal deviation present.   Cardiovascular: Normal rate and regular rhythm.    No murmur heard.  Pulmonary/Chest: Effort normal and breath sounds normal. No respiratory distress. She has no wheezes. She has no rales.   Abdominal: Soft. Bowel sounds are normal. She exhibits no distension and no mass. There is no tenderness.   Musculoskeletal: Normal range of motion. She exhibits no edema or deformity.   Neurological: She is alert and oriented to person, place, and time. No cranial nerve deficit.   Skin: Skin is warm and dry. No rash noted. No erythema.   Psychiatric: She has a normal mood and affect. Her behavior is normal. Judgment and thought content normal.   Nursing note and vitals reviewed.      Results Review:   I reviewed the patient's new clinical results.    Lab Results (last 24 hours)     Procedure Component Value Units Date/Time    Influenza Antigen, Rapid - Swab, Nasopharynx [[id number]]  (Abnormal) Collected:  02/16/18 1355    Specimen:  Swab from Nasopharynx Updated:  02/16/18 1405     Influenza A Ag, EIA Negative     Influenza B Ag, EIA Positive (A)    CBC & Differential [[id number]] Collected:  02/16/18 1408    Specimen:  Blood Updated:  02/16/18 1413    Narrative:       The following orders were created for panel order CBC & Differential.  Procedure                               Abnormality         Status                     ---------                               -----------         ------                     CBC  Auto Differential[[id number]]        Abnormal            Final result                 Please view results for these tests on the individual orders.    CBC Auto Differential [[id number]]  (Abnormal) Collected:  02/16/18 1408    Specimen:  Blood Updated:  02/16/18 1413     WBC 4.60 10*3/mm3      RBC 3.83 (C) 10*6/mm3      Hemoglobin 11.7 (L) g/dL      Hematocrit 35.9 %      RDW 13.2 %      MCV 93.5 fL      MCH 30.6 pg      MCHC 32.7 (L) g/dL      MPV 8.0 fL      Platelets 219 10*3/mm3      Neutrophil % 72.4 %      Lymphocyte % 20.5 (L) %      Monocyte % 7.1 %      Neutrophils, Absolute 3.30 10*3/mm3      Lymphocytes, Absolute 0.90 (L) 10*3/mm3      Monocytes, Absolute 0.30 10*3/mm3     CBC & Differential [[id number]] Collected:  02/16/18 1541    Specimen:  Blood Updated:  02/16/18 1557    Narrative:       The following orders were created for panel order CBC & Differential.  Procedure                               Abnormality         Status                     ---------                               -----------         ------                     CBC Auto Differential[[id number]]        Abnormal            Final result                 Please view results for these tests on the individual orders.    Comprehensive Metabolic Panel [[id number]]  (Abnormal) Collected:  02/16/18 1541    Specimen:  Blood Updated:  02/16/18 1612     Glucose 103 (H) mg/dL      BUN 13 mg/dL      Creatinine 0.77 mg/dL      Sodium 128 (L) mmol/L      Potassium 4.3 mmol/L      Chloride 91 (L) mmol/L      CO2 24.6 mmol/L      Calcium 8.8 mg/dL      Total Protein 6.1 g/dL      Albumin 3.70 g/dL      ALT (SGPT) 13 U/L      AST (SGOT) 23 U/L      Alkaline Phosphatase 59 U/L      Total Bilirubin 0.2 mg/dL      eGFR Non African Amer 70 mL/min/1.73      Globulin 2.4 gm/dL      A/G Ratio 1.5 g/dL      BUN/Creatinine Ratio 16.9     Anion Gap 12.4 mmol/L     Narrative:       The MDRD GFR formula is only valid for adults with stable renal function between ages 18  and 70.    Troponin [[id number]]  (Normal) Collected:  02/16/18 1541    Specimen:  Blood Updated:  02/16/18 1612     Troponin T <0.010 ng/mL     Narrative:       Troponin T Reference Ranges:  Less than 0.03 ng/mL:    Negative for AMI  0.03 to 0.09 ng/mL:      Indeterminant for AMI  Greater than 0.09 ng/mL: Positive for AMI    CBC Auto Differential [[id number]]  (Abnormal) Collected:  02/16/18 1541    Specimen:  Blood Updated:  02/16/18 1557     WBC 3.71 (L) 10*3/mm3      RBC 3.53 (L) 10*6/mm3      Hemoglobin 11.0 (L) g/dL      Hematocrit 33.3 (L) %      MCV 94.3 fL      MCH 31.2 pg      MCHC 33.0 g/dL      RDW 13.2 (H) %      RDW-SD 45.6 fl      MPV 10.6 fL      Platelets 211 10*3/mm3      Neutrophil % 67.6 %      Lymphocyte % 17.8 (L) %      Monocyte % 14.3 (H) %      Eosinophil % 0.0 (L) %      Basophil % 0.3 %      Immature Grans % 0.0 %      Neutrophils, Absolute 2.51 10*3/mm3      Lymphocytes, Absolute 0.66 (L) 10*3/mm3      Monocytes, Absolute 0.53 10*3/mm3      Eosinophils, Absolute 0.00 10*3/mm3      Basophils, Absolute 0.01 10*3/mm3      Immature Grans, Absolute 0.00 10*3/mm3     Urinalysis - Urine, Clean Catch [[id number]]  (Abnormal) Collected:  02/16/18 1553    Specimen:  Urine from Urine, Clean Catch Updated:  02/16/18 1607     Color, UA Yellow     Appearance, UA Clear     pH, UA 5.5     Specific Gravity, UA 1.022     Glucose, UA Negative     Ketones, UA Trace (A)     Bilirubin, UA Negative     Blood, UA Negative     Protein, UA 30 mg/dL (1+) (A)     Leuk Esterase, UA Negative     Nitrite, UA Negative     Urobilinogen, UA 0.2 E.U./dL    Lactic Acid, Plasma [[id number]]  (Normal) Collected:  02/16/18 1807    Specimen:  Blood Updated:  02/16/18 1827     Lactate 0.9 mmol/L           CT Head Without Contrast   Final Result       No evidence for acute intracranial pathology. There is no convincing   evidence to suggest a seizure focus. However, further evaluation could   be performed with MR imaging for much  "more sensitive and specific   detection of a potential seizure focus if clinically indicated. These   findings and recommendations were directly discussed with Dr. Serrano on   02/16/2018 at approximately 4:18 PM.       Changes of inflammatory paranasal sinus disease are incidentally noted   and discussed in detail above.       Radiation dose reduction techniques were utilized, including automated   exposure control and exposure modulation based on body size.       This report was finalized on 2/16/2018 4:19 PM by Dr. Avel Miranda MD.          XR Chest 2 View    (Results Pending)     Assessment/Plan   Active Hospital Problems (** Indicates Principal Problem)    Diagnosis Date Noted   • **Syncope [R55] 02/16/2018   • Influenza B [J10.1] 02/16/2018   • Hyponatremia [E87.1] 02/16/2018   • DM (diabetes mellitus), type 2 [E11.9] 12/16/2016   • Essential hypertension [I10] 12/16/2016      Resolved Hospital Problems    Diagnosis Date Noted Date Resolved   No resolved problems to display.       Ms. Sams is a 89 y.o. female who is admitted following a syncopal episode with associated \"seizure\" activity.      · She does have an influenza infection but this is not what is prompting her admission to the hospital. Her symptoms are greater than 48 hours and she is very reluctant to take Tamiflu.  · Head CT negative for a source of syncope or seizure. Will check MRI brain. Doubtful that this 89-year-old actually had a seizure but we will get neurology to evaluate. This likely related to her influenza virus and mild dehydration. We will monitor orthostatics. Will check urine studies regarding hyponatremia.  · We will monitor blood sugars with low-dose protocol insulin.      I discussed the patients findings and my recommendations with patient and family.      Sridhar Hernández MD  [facility]  02/16/18  7:57 PM    "

## 2018-02-17 NOTE — SIGNIFICANT NOTE
02/17/18 0850   Rehab Treatment   Discipline physical therapist   Rehab Evaluation   Evaluation Not Performed patient unavailable for evaluation  (off floor MRI, neuro consult. will follow up. )   Recommendation   PT - Next Appointment 02/18/18

## 2018-02-17 NOTE — PROGRESS NOTES
Clinical Pharmacy Services: Medication History    Cristal Sams is a 89 y.o. female presenting to [facility] for   Chief Complaint   Patient presents with   • Vomiting   • Syncope       She  has a past medical history of Anemia; Arthritis; Breast cancer; Colon polyp; Diabetes mellitus; GERD (gastroesophageal reflux disease); H/O Cataract; Hiatal hernia; History of hepatitis (1958); History of kidney stone; History of peptic ulcer; History of vertigo; Hyperlipidemia; Hypertension; Scoliosis; Unexplained weight loss; and Visual impairment.    Allergies as of 02/16/2018 - [place] as Reviewed 02/16/2018   Allergen Reaction Noted   • Contrast dye  06/22/2016   • Novocain [procaine] Palpitations and Parasthesia 06/22/2016   • Aleve [naproxen sodium] GI Intolerance 06/22/2016   • Cortizone-10 [hydrocortisone] Other (See Comments) 06/22/2016   • Eye drops [naphazoline-polyethyl glycol] Other (See Comments) 06/22/2016   • Lipitor [atorvastatin] Other (See Comments) 06/22/2016   • Sulfur Other (See Comments) 06/22/2016   • Valium [diazepam] Other (See Comments) 06/22/2016   • Zocor [simvastatin] Other (See Comments) 06/22/2016   • Zyrtec [cetirizine] Other (See Comments) 06/22/2016       Medication information was obtained from: Patient, family, pharmacy  Pharmacy and Phone Number: [facility] [phone]    Prior to Admission Medications     Prescriptions Last Dose Informant Patient Reported? Taking?    amLODIPine (NORVASC) 5 MG tablet  Pharmacy No Yes    Take 1 tablet by mouth Daily.    cyanocobalamin injection 1,000 mcg   No No    esomeprazole (nexIUM) 40 MG capsule  Family Member Yes Yes    Take 40 mg by mouth Every Morning Before Breakfast.    glucose blood (ONE TOUCH ULTRA TEST) test strip   No No    Test Blood Sugar Once Daily    lisinopril (PRINIVIL,ZESTRIL) 40 MG tablet  Pharmacy Yes Yes    Take 40 mg by mouth Daily.    rosuvastatin (CRESTOR) 5 MG tablet  Pharmacy Yes Yes    Take 5 mg by mouth Daily.     vitamin D (ERGOCALCIFEROL) 29858 units capsule capsule  Family Member Yes Yes    Take 50,000 Units by mouth Every 14 (Fourteen) Days. Sunday, Wednesday            Medication notes: Removed per patient (therapy complete): Norco, Zofran    Patient is not taking B-12 injections due to testing and surgery    This medication list is complete to the best of my knowledge as of 2/16/2018    Please call if questions.    Geeta Hernandez, Medication History Technician  2/16/2018 7:40 PM

## 2018-02-17 NOTE — PLAN OF CARE
Problem: Patient Care Overview (Adult)  Goal: Plan of Care Review  Outcome: Ongoing (interventions implemented as appropriate)   02/16/18 5958   Coping/Psychosocial Response Interventions   Plan Of Care Reviewed With patient   Patient Care Overview   Progress improving   Outcome Evaluation   Outcome Summary/Follow up Plan pt admitted from ED with syncope, brought in after syncope episode at PCP office today; pt reports feeling nauseated & weak after nasal swab at PCP office to check for influenza; neurology consulted, MRI screen completed & called to MRI staff-pt will need partial dentures & telemetry monitor removed prior to departure to MRI tomorrow; urine sample sent, orthostatics completed and negative; ivf's infusing; nsr on monitor; am labs ordered.PT consulted; amublated to BR with assist x1 & use of IV pole for stability, gait belt. no dizziness with ambulation nor upon rising.     Goal: Adult Individualization and Mutuality  Outcome: Ongoing (interventions implemented as appropriate)    Goal: Discharge Needs Assessment  Outcome: Ongoing (interventions implemented as appropriate)      Problem: Fall Risk (Adult)  Goal: Identify Related Risk Factors and Signs and Symptoms  Outcome: Outcome(s) achieved Date Met: 02/16/18    Goal: Absence of Falls  Outcome: Ongoing (interventions implemented as appropriate)      Problem: Respiratory Insufficiency (Adult)  Goal: Identify Related Risk Factors and Signs and Symptoms  Outcome: Outcome(s) achieved Date Met: 02/16/18    Goal: Acid/Base Balance  Outcome: Ongoing (interventions implemented as appropriate)    Goal: Effective Ventilation  Outcome: Ongoing (interventions implemented as appropriate)

## 2018-02-17 NOTE — CONSULTS
"    Patient Name: Cristal Sams  :1927  89 y.o.    Date of Admission: 2018  Date of Consultation:  18  Encounter Provider: Dalia Olivo RN  Place of Service: [facility] CARDIOLOGY  Referring Provider: Sridhar Hernández MD  Patient Care Team:  George Rock MD as PCP - General  Pastor Crews MD as Consulting Physician (Gastroenterology)  George Rock MD as Referring Physician (Internal Medicine)  Milly Penaloza MD as Consulting Physician (Hematology and Oncology)      Chief complaint: potential seizure    Consulted for: bradycardia, syncope    History of Present Illness:     89year-old female with a history of hypertension, diabetes, breast cancer who presented to the emergency room yesterday after an episode of \"rigidity\" it lasted for approximately 3 minutes.  Patient has been found to have influenza B.  Her heart rate was noted to be in the 50s.  She recently diagnosed with the flu after having cough fever and diarrhea.  We are asked to see patient because of bradycardia and the episode of which she became rigid.  Since admission her heart rate has been in the 40s while she is asleep she also dropped her saturation.  She has not had any significant pauses.  Her workup from a neurologic standpoint has been unremarkable.  She has not had orthostatic hypotension on either    Past Medical History:   Diagnosis Date   • Anemia    • Arthritis    • Breast cancer     LEFT   • Colon polyp    • Diabetes mellitus     DIET CONTROLLED   • GERD (gastroesophageal reflux disease)    • H/O Cataract    • Hiatal hernia    • History of hepatitis    • History of kidney stone    • History of peptic ulcer    • History of vertigo    • Hyperlipidemia    • Hypertension    • Scoliosis    • Unexplained weight loss     #43 lb in 3 months    • Visual impairment        Past Surgical History:   Procedure Laterality Date   • CATARACT EXTRACTION Bilateral    • CATARACT EXTRACTION "     • COLONOSCOPY W/ BIOPSIES     • DILATATION AND CURETTAGE     • EYE SURGERY      cataract extraction   • MASTECTOMY WITH SENTINEL NODE BIOPSY AND AXILLARY NODE DISSECTION Left 12/7/2017    Procedure:  left total mastectomy, left axillary sentinel lymph node biopsy x 3 ;  Surgeon: Madison Ponce MD;  [place] [facility] [place] [facility] [place]  Service:    • SINUS SURGERY      CAUTERIZE A BLEED         Prior to Admission medications    Medication Sig Start Date End Date Taking? Authorizing Provider   amLODIPine (NORVASC) 5 MG tablet Take 1 tablet by mouth Daily.  Patient taking differently: Take 5 mg by mouth Daily As Needed. 9/8/17  Yes George Rock MD   esomeprazole (nexIUM) 40 MG capsule Take 40 mg by mouth Every Morning Before Breakfast.   Yes Historical Provider, MD   lisinopril (PRINIVIL,ZESTRIL) 40 MG tablet Take 40 mg by mouth Daily.   Yes Historical Provider, MD   rosuvastatin (CRESTOR) 5 MG tablet Take 5 mg by mouth Daily.   Yes Historical Provider, MD   vitamin D (ERGOCALCIFEROL) 07025 units capsule capsule Take 50,000 Units by mouth Every 14 (Fourteen) Days. Sunday, Wednesday   Yes Historical Provider, MD   glucose blood (ONE TOUCH ULTRA TEST) test strip Test Blood Sugar Once Daily 6/27/17   George Rock MD       Allergies   Allergen Reactions   • Contrast Dye    • Novocain [Procaine] Palpitations and Parasthesia     LEG GET NUMB   • Aleve [Naproxen Sodium] GI Intolerance   • Cortizone-10 [Hydrocortisone] Other (See Comments)     UNSURE   • Eye Drops [Naphazoline-Polyethyl Glycol] Other (See Comments)     UNSURE   • Lipitor [Atorvastatin] Other (See Comments)     UNSURE   • Sulfur Other (See Comments)     UNSURE   • Valium [Diazepam] Other (See Comments)     Doesn't like the way it makes her feel   • Zocor [Simvastatin] Other (See Comments)     UNSURE   • Zyrtec [Cetirizine] Other (See Comments)     UNSURE       Social History     Social History   • Marital status:      Spouse name: N/A   • Number of  children: N/A   • Years of education: 6 months college     Occupational History   •  Retired     [employer]     Social History Main Topics   • Smoking status: Never Smoker   • Smokeless tobacco: Never Used   • Alcohol use No   • Drug use: No   • Sexual activity: Defer     Other Topics Concern   • None     Social History Narrative   • None       Family History   Problem Relation Age of Onset   • Uterine cancer Mother    • Coronary artery disease Mother    • Lung cancer Brother    • Heart attack Brother    • Heart disease Brother    • Malig Hyperthermia Neg Hx        REVIEW OF SYSTEMS:   All systems reviewed.  Pertinent positives identified in HPI.  All other systems are negative.      Objective:     Vitals:    02/17/18 0729 02/17/18 0734 02/17/18 0739 02/17/18 0921   BP: 152/64 143/73 131/79    BP Location: Right arm Right arm Right arm    Patient Position: Lying Standing Standing    Pulse: 56   72   Resp: 18      Temp: 99.4 °F (37.4 °C)      TempSrc: Oral      SpO2: 91%      Weight:       Height:         Body mass index is 20.25 kg/(m^2).    General Appearance:    Alert, cooperative, in no acute distress   Head:    Normocephalic, without obvious abnormality, atraumatic   Eyes:            Lids and lashes normal, conjunctivae and sclerae normal, no   icterus, no pallor, corneas clear, PERRLA   Ears:    Ears appear intact with no abnormalities noted   Throat:   No oral lesions, no thrush, oral mucosa moist   Neck:   No adenopathy, supple, trachea midline, no thyromegaly, no   carotid bruit, no JVD   Back:     No kyphosis present, no scoliosis present, no skin lesions, erythema or scars, no tenderness to percussion or palpation, range of motion normal   Lungs:     Clear to auscultation,respirations regular, even and unlabored    Heart:    Regular rhythm and normal rate, normal S1 and S2, no murmur, no gallop, no rub, no click   Chest Wall:    No abnormalities observed   Abdomen:     Normal bowel sounds, no  masses, no organomegaly, soft        non-tender, non-distended, no guarding, no rebound  tenderness   Extremities:   Moves all extremities well, no edema, no cyanosis, no redness   Pulses:   Pulses palpable and equal bilaterally. Normal radial, carotid, femoral, dorsalis pedis and posterior tibial pulses bilaterally. Normal abdominal aorta   Skin:  Psychiatric:   No bleeding, bruising or rash    Alert and oriented x 3, normal mood and affect   Lab Review:       Results from last 7 days  Lab Units 02/17/18  0446 02/16/18  1541   SODIUM mmol/L 131* 128*   POTASSIUM mmol/L 3.9 4.3   CHLORIDE mmol/L 96* 91*   CO2 mmol/L 23.4 24.6   BUN mg/dL 11 13   CREATININE mg/dL 0.61 0.77   CALCIUM mg/dL 8.3 8.8   BILIRUBIN mg/dL  --  0.2   ALK PHOS U/L  --  59   ALT (SGPT) U/L  --  13   AST (SGOT) U/L  --  23   GLUCOSE mg/dL 87 103*       Results from last 7 days  Lab Units 02/16/18  1541   TROPONIN T ng/mL <0.010       Results from last 7 days  Lab Units 02/17/18  0446   WBC 10*3/mm3 3.28*   HEMOGLOBIN g/dL 10.8*   HEMATOCRIT % 33.2*   PLATELETS 10*3/mm3 188                              I personally viewed and interpreted the patient's EKG/Telemetry data.        Assessment and Plan:       1.  Bradycardia currently there is not enough to diagnose symptomatic bradycardia.  We'll continue to see what evolves.  We'll check an echocardiogram for completeness sake follow monitor.  If nothing is found to consider an outpatient monitor.  She is not on any nadege offending agents.  2.  Flu she has had desaturation which could be contributing we'll defer to Hospital physicians.  3.  We'll follow along    Dalia Olivo, HAYDER  02/17/18  12:30 PM    Irwin Thorne MD  [facility]  2/17/2018 1:39 PM

## 2018-02-17 NOTE — PROGRESS NOTES
Name: Cristal Sams ADMIT: 2018   : 1927  PCP: George Rock MD    MRN: [id number] LOS: 1 days   AGE/SEX: 89 y.o. female  ROOM: [facility]/1   Subjective   Subjective  Cc- cough    Cough is fair  Mostly dry  Does desat to 80s when sleeping- 86% during my exam  HR dropping to 40s when sleeping  Had syncope prior to admission  Was at pcp office  Nausea prior to episoide  Had convulsions afterwards  Orthostatics negative    ROS  No f/c  +n/-v  No cp/palp  No soa/cough  Objective   Vital Signs  Temp:  [97.7 °F (36.5 °C)-99.4 °F (37.4 °C)] 99.4 °F (37.4 °C)  Heart Rate:  [55-74] 72  Resp:  [16-22] 18  BP: (100-163)/(56-79) 131/79  SpO2:  [91 %-98 %] 91 %  on   ;   O2 Device: room air  Body mass index is 20.25 kg/(m^2).    Physical Exam    Alert  Supple, no jvd  Elderly  Bradycardia, no murmurs  Soft, nt  No edema or cyanosis  Lungs clear no resp distress  Pleasant, appropriate    Results Review:       I reviewed the patient's new clinical results.    Results from last 7 days  Lab Units 18  0446 18  1541 18  1408   WBC 10*3/mm3 3.28* 3.71* 4.60   HEMOGLOBIN g/dL 10.8* 11.0* 11.7*   PLATELETS 10*3/mm3 188 211 219       Results from last 7 days  Lab Units 18  0446 18  1541   SODIUM mmol/L 131* 128*   POTASSIUM mmol/L 3.9 4.3   CHLORIDE mmol/L 96* 91*   CO2 mmol/L 23.4 24.6   BUN mg/dL 11 13   CREATININE mg/dL 0.61 0.77   GLUCOSE mg/dL 87 103*   Estimated Creatinine Clearance: 32.5 mL/min (by C-G formula based on Cr of 0.61).    Results from last 7 days  Lab Units 18  0446 18  1541   CALCIUM mg/dL 8.3 8.8   ALBUMIN g/dL  --  3.70     MRI Brain With & Without Contrast [[id number]] Not Reviewed        Order Status: Completed Collected: 18        Updated: 18       Narrative:         MRI BRAIN W WO CONTRAST-     INDICATIONS: Seizures     TECHNIQUE: Multiplanar multisequence magnetic resonance imaging of the  brain, without and with intravenous  contrast material.     COMPARISON: Head CT from 02/16/2018     FINDINGS:     The diffusion-weighted images show no restricted diffusion to suggest  acute infarct. Small encephalomalacia/old infarct change is apparent  laterally adjacent to the posterior horn of the left lateral ventricle.        The midline structures appear unremarkable.     The brain parenchyma shows moderate periventricular white matter T2  FLAIR signal hyperintensity suggesting chronic small vessel ischemic  change in a patient this age. A few scattered small foci of  susceptibility artifact in the bilateral cerebrum, nonspecific, could  for example be evidence of amyloid angiopathy or sequela of old  hypertensive microhemorrhages. No enhancing lesions of brain are  identified. Vascular flow related artifact is present on the  postcontrast images.     Flow voids in the major arteries at the base of the brain appear  unremarkable.     Partial opacification of the sphenoid sinus. The paranasal sinuses,  mastoid air cells, and orbits otherwise appear unremarkable.          Impression:         No acute infarct. No enhancing lesion in brain. Chronic-appearing  changes. Follow-up as indications persist.           This report was finalized on 2/17/2018 9:15 AM by Dr. Levon Grissom MD.          XR Chest 2 View [[id number]] Not Reviewed       Order Status: Completed Collected: 02/16/18 2113        Updated: 02/17/18 1153       Narrative:         EMERGENCY PA AND LATERAL CHEST X-RAY 02/16/2018     CLINICAL HISTORY: Cough, diagnosed with flu today.      COMPARISON: Correlated to prior chest CT 01/26/2018.     FINDINGS: There are clips in the left axilla from a previous left  axillary dissection. There has been a previous left mastectomy. The  cardiomediastinal silhouette and pulmonary vasculature are within normal  limits. There are scattered tiny noncalcified and calcified nodules in  the lungs likely granulomas. The remainder of the lungs are clear.  The  costophrenic angles are sharp.          Impression:            1. No change when compared to chest CT from [facility] on 01/26/2018. No definite active disease is seen in the  chest. There has been a previous left mastectomy and left axillary  dissection.     This report was finalized on 2/17/2018 11:50 AM by Dr. George Balbuena MD.          CT Head Without Contrast [[id number]] Not Reviewed       Order Status: Completed Collected: 02/16/18 1612        Updated: 02/16/18 1622       Narrative:         CT HEAD WITHOUT CONTRAST     CLINICAL HISTORY: Syncope versus seizure.     CT scan of the head was obtained with 3 mm axial images. No intravenous  contrast was administered.     FINDINGS:     The ventricles, sulci, and cisterns are age appropriate. There are  mild-to-moderate changes of chronic small vessel ischemic phenomena.  There is either an enlarged perivascular space or chronic infarct within  the right aspect of the midbrain measuring up to 3 mm in diameter.  Otherwise, the posterior fossa structures are within normal limits.     The extracranial structures are incidentally notable for mucosal  thickening within the sphenoid sinus. There is a mucous retention cyst  within the right aspect of the sphenoid sinus.          Impression:            No evidence for acute intracranial pathology. There is no convincing  evidence to suggest a seizure focus. However, further evaluation could  be performed with MR imaging for much more sensitive and specific  detection of a potential seizure focus if clinically indicated. These  findings and recommendations were directly discussed with Dr. Serrano on  02/16/2018 at approximately 4:18 PM.               amLODIPine 5 mg Oral Daily   insulin aspart 0-7 Units Subcutaneous 4x Daily With Meals & Nightly   lisinopril 40 mg Oral Daily   pantoprazole 40 mg Oral QAM   rosuvastatin 5 mg Oral Daily       sodium chloride 100 mL/hr Last Rate: 100 mL/hr (02/17/18 0152)    Diet Regular; Consistent Carbohydrate      Assessment/Plan   Active Hospital Problems (** Indicates Principal Problem)    Diagnosis Date Noted   • **Syncope [R55] 02/16/2018   • Hypoxia [R09.02] 02/17/2018   • Bradycardia [R00.1] 02/17/2018   • Convulsions [R56.9] 02/17/2018   • Influenza B [J10.1] 02/16/2018   • Hyponatremia [E87.1] 02/16/2018   • DM (diabetes mellitus), type 2 [E11.9] 12/16/2016   • Essential hypertension [I10] 12/16/2016      Resolved Hospital Problems    Diagnosis Date Noted Date Resolved   No resolved problems to display.       Ms. Sams is a 89 y.o. female who has had symptoms of influenza for a few days who has been admitted with syncope with convulsions at doctors office    · Suspect vaso-vagal syncope. She states had nausea before the episode and had been feeling ill due to the flu. Orthostatics reportedly negative.  · Has had some asymptomatic bradycardia on the monitor to 40s. I think this will be benign but in the setting of syncope will consult cardiology to see what they think. Monitor HR to see if increases with exertion  · Likely the convulsions were related to the syncope, but Neurology has been consulted. MRI done overnight which was negative  · Continue IVFs. Patient declined tamiflu. Does have some hypoxia O2 to 86% when sleeping during my exam. May need some supplemental O2 during hospitalization.     D/W RN  Reviewed previous records  Greater than 38 minute spent with greater than 50% counseling and coordinating care    Full code for now but patient to discuss with her family. I recommend DNR/DNI    Rodolfo Sykes MD  [facility]  02/17/18  11:57 AM

## 2018-02-18 ENCOUNTER — APPOINTMENT (OUTPATIENT)
Dept: CARDIOLOGY | Facility: HOSPITAL | Age: 83
End: 2018-02-18
Attending: INTERNAL MEDICINE

## 2018-02-18 PROBLEM — D70.3 NEUTROPENIA ASSOCIATED WITH INFECTION (HCC): Status: ACTIVE | Noted: 2018-02-18

## 2018-02-18 LAB
ANION GAP SERPL CALCULATED.3IONS-SCNC: 12.5 MMOL/L
BASOPHILS # BLD AUTO: 0 10*3/MM3 (ref 0–0.2)
BASOPHILS NFR BLD AUTO: 0 % (ref 0–1.5)
BH CV ECHO MEAS - ACS: 1.9 CM
BH CV ECHO MEAS - AI DEC SLOPE: 200 CM/SEC^2
BH CV ECHO MEAS - AI MAX PG: 36.2 MMHG
BH CV ECHO MEAS - AI MAX VEL: 300.7 CM/SEC
BH CV ECHO MEAS - AI P1/2T: 440.3 MSEC
BH CV ECHO MEAS - AO MEAN PG (FULL): 1 MMHG
BH CV ECHO MEAS - AO MEAN PG: 4 MMHG
BH CV ECHO MEAS - AO V2 MAX: 148 CM/SEC
BH CV ECHO MEAS - AO V2 MEAN: 98 CM/SEC
BH CV ECHO MEAS - AO V2 VTI: 30.2 CM
BH CV ECHO MEAS - AVA(I,A): 3.3 CM^2
BH CV ECHO MEAS - AVA(I,D): 3.3 CM^2
BH CV ECHO MEAS - BSA(HAYCOCK): 1.3 M^2
BH CV ECHO MEAS - BSA: 1.2 M^2
BH CV ECHO MEAS - BZI_BMI: 24 KILOGRAMS/M^2
BH CV ECHO MEAS - BZI_METRIC_HEIGHT: 134.6 CM
BH CV ECHO MEAS - BZI_METRIC_WEIGHT: 43.5 KG
BH CV ECHO MEAS - CONTRAST EF (2CH): 63.2 ML/M^2
BH CV ECHO MEAS - CONTRAST EF 4CH: 64.5 ML/M^2
BH CV ECHO MEAS - EDV(CUBED): 29.8 ML
BH CV ECHO MEAS - EDV(MOD-SP2): 57 ML
BH CV ECHO MEAS - EDV(MOD-SP4): 62 ML
BH CV ECHO MEAS - EDV(TEICH): 37.9 ML
BH CV ECHO MEAS - EF(CUBED): 73.1 %
BH CV ECHO MEAS - EF(MOD-SP2): 63.2 %
BH CV ECHO MEAS - EF(MOD-SP4): 64.5 %
BH CV ECHO MEAS - EF(TEICH): 66.4 %
BH CV ECHO MEAS - ESV(CUBED): 8 ML
BH CV ECHO MEAS - ESV(MOD-SP2): 21 ML
BH CV ECHO MEAS - ESV(MOD-SP4): 22 ML
BH CV ECHO MEAS - ESV(TEICH): 12.7 ML
BH CV ECHO MEAS - FS: 35.5 %
BH CV ECHO MEAS - IVS/LVPW: 1
BH CV ECHO MEAS - IVSD: 1 CM
BH CV ECHO MEAS - LA DIMENSION: 4 CM
BH CV ECHO MEAS - LAT PEAK E' VEL: 8 CM/SEC
BH CV ECHO MEAS - LV DIASTOLIC VOL/BSA (35-75): 49.6 ML/M^2
BH CV ECHO MEAS - LV MASS(C)D: 86.2 GRAMS
BH CV ECHO MEAS - LV MASS(C)DI: 69 GRAMS/M^2
BH CV ECHO MEAS - LV MEAN PG: 3 MMHG
BH CV ECHO MEAS - LV SYSTOLIC VOL/BSA (12-30): 17.6 ML/M^2
BH CV ECHO MEAS - LV V1 MAX: 134 CM/SEC
BH CV ECHO MEAS - LV V1 MEAN: 82 CM/SEC
BH CV ECHO MEAS - LV V1 VTI: 28.5 CM
BH CV ECHO MEAS - LVIDD: 3.1 CM
BH CV ECHO MEAS - LVIDS: 2 CM
BH CV ECHO MEAS - LVLD AP2: 7.6 CM
BH CV ECHO MEAS - LVLD AP4: 7.5 CM
BH CV ECHO MEAS - LVLS AP2: 5.9 CM
BH CV ECHO MEAS - LVLS AP4: 6.8 CM
BH CV ECHO MEAS - LVOT AREA (M): 3.5 CM^2
BH CV ECHO MEAS - LVOT AREA: 3.5 CM^2
BH CV ECHO MEAS - LVOT DIAM: 2.1 CM
BH CV ECHO MEAS - LVPWD: 1 CM
BH CV ECHO MEAS - MED PEAK E' VEL: 6 CM/SEC
BH CV ECHO MEAS - MV A DUR: 0.14 SEC
BH CV ECHO MEAS - MV A MAX VEL: 95.3 CM/SEC
BH CV ECHO MEAS - MV DEC SLOPE: 304 CM/SEC^2
BH CV ECHO MEAS - MV DEC TIME: 0.27 SEC
BH CV ECHO MEAS - MV E MAX VEL: 83.2 CM/SEC
BH CV ECHO MEAS - MV E/A: 0.87
BH CV ECHO MEAS - MV MEAN PG: 1 MMHG
BH CV ECHO MEAS - MV P1/2T MAX VEL: 91.1 CM/SEC
BH CV ECHO MEAS - MV P1/2T: 87.8 MSEC
BH CV ECHO MEAS - MV V2 MEAN: 49.8 CM/SEC
BH CV ECHO MEAS - MV V2 VTI: 29.8 CM
BH CV ECHO MEAS - MVA P1/2T LCG: 2.4 CM^2
BH CV ECHO MEAS - MVA(P1/2T): 2.5 CM^2
BH CV ECHO MEAS - MVA(VTI): 3.3 CM^2
BH CV ECHO MEAS - PA ACC SLOPE: 10.4 CM/SEC^2
BH CV ECHO MEAS - PA ACC TIME: 0.13 SEC
BH CV ECHO MEAS - PA MAX PG (FULL): 1.7 MMHG
BH CV ECHO MEAS - PA MAX PG: 3.4 MMHG
BH CV ECHO MEAS - PA PR(ACCEL): 20.5 MMHG
BH CV ECHO MEAS - PA V2 MAX: 92.3 CM/SEC
BH CV ECHO MEAS - PULM A REVS DUR: 0.13 SEC
BH CV ECHO MEAS - PULM A REVS VEL: 34.1 CM/SEC
BH CV ECHO MEAS - PULM DIAS VEL: 44.4 CM/SEC
BH CV ECHO MEAS - PULM S/D: 1.4
BH CV ECHO MEAS - PULM SYS VEL: 64.2 CM/SEC
BH CV ECHO MEAS - PVA(V,A): 2 CM^2
BH CV ECHO MEAS - PVA(V,D): 2 CM^2
BH CV ECHO MEAS - QP/QS: 0.42
BH CV ECHO MEAS - RAP SYSTOLE: 3 MMHG
BH CV ECHO MEAS - RV MAX PG: 1.7 MMHG
BH CV ECHO MEAS - RV MEAN PG: 1 MMHG
BH CV ECHO MEAS - RV V1 MAX: 65.4 CM/SEC
BH CV ECHO MEAS - RV V1 MEAN: 46.5 CM/SEC
BH CV ECHO MEAS - RV V1 VTI: 14.5 CM
BH CV ECHO MEAS - RVOT AREA: 2.8 CM^2
BH CV ECHO MEAS - RVOT DIAM: 1.9 CM
BH CV ECHO MEAS - RVSP: 28 MMHG
BH CV ECHO MEAS - SI(CUBED): 17.4 ML/M^2
BH CV ECHO MEAS - SI(LVOT): 79 ML/M^2
BH CV ECHO MEAS - SI(MOD-SP2): 28.8 ML/M^2
BH CV ECHO MEAS - SI(MOD-SP4): 32 ML/M^2
BH CV ECHO MEAS - SI(TEICH): 20.2 ML/M^2
BH CV ECHO MEAS - SV(CUBED): 21.8 ML
BH CV ECHO MEAS - SV(LVOT): 98.7 ML
BH CV ECHO MEAS - SV(MOD-SP2): 36 ML
BH CV ECHO MEAS - SV(MOD-SP4): 40 ML
BH CV ECHO MEAS - SV(RVOT): 41.1 ML
BH CV ECHO MEAS - SV(TEICH): 25.2 ML
BH CV ECHO MEAS - TAPSE (>1.6): 1.7 CM2
BH CV ECHO MEAS - TR MAX VEL: 250 CM/SEC
BH CV XLRA - RV BASE: 3.3 CM
BH CV XLRA - TDI S': 14 CM/SEC
BUN BLD-MCNC: 7 MG/DL (ref 8–23)
BUN/CREAT SERPL: 12.3 (ref 7–25)
CALCIUM SPEC-SCNC: 8.1 MG/DL (ref 8.2–9.6)
CHLORIDE SERPL-SCNC: 99 MMOL/L (ref 98–107)
CO2 SERPL-SCNC: 24.5 MMOL/L (ref 22–29)
CREAT BLD-MCNC: 0.57 MG/DL (ref 0.57–1)
DEPRECATED RDW RBC AUTO: 45.1 FL (ref 37–54)
E/E' RATIO: 11
EOSINOPHIL # BLD AUTO: 0.01 10*3/MM3 (ref 0–0.7)
EOSINOPHIL NFR BLD AUTO: 0.3 % (ref 0.3–6.2)
ERYTHROCYTE [DISTWIDTH] IN BLOOD BY AUTOMATED COUNT: 13 % (ref 11.7–13)
GFR SERPL CREATININE-BSD FRML MDRD: 100 ML/MIN/1.73
GLUCOSE BLD-MCNC: 90 MG/DL (ref 65–99)
GLUCOSE BLDC GLUCOMTR-MCNC: 93 MG/DL (ref 70–130)
GLUCOSE BLDC GLUCOMTR-MCNC: 96 MG/DL (ref 70–130)
GLUCOSE BLDC GLUCOMTR-MCNC: 97 MG/DL (ref 70–130)
GLUCOSE BLDC GLUCOMTR-MCNC: 98 MG/DL (ref 70–130)
HCT VFR BLD AUTO: 34.6 % (ref 35.6–45.5)
HGB BLD-MCNC: 11.1 G/DL (ref 11.9–15.5)
IMM GRANULOCYTES # BLD: 0 10*3/MM3 (ref 0–0.03)
IMM GRANULOCYTES NFR BLD: 0 % (ref 0–0.5)
LEFT ATRIUM VOLUME INDEX: 14 ML/M2
LEFT ATRIUM VOLUME: 19 CM3
LYMPHOCYTES # BLD AUTO: 1.13 10*3/MM3 (ref 0.9–4.8)
LYMPHOCYTES NFR BLD AUTO: 36.8 % (ref 19.6–45.3)
MAXIMAL PREDICTED HEART RATE: 130 BPM
MCH RBC QN AUTO: 30.5 PG (ref 26.9–32)
MCHC RBC AUTO-ENTMCNC: 32.1 G/DL (ref 32.4–36.3)
MCV RBC AUTO: 95.1 FL (ref 80.5–98.2)
MONOCYTES # BLD AUTO: 0.33 10*3/MM3 (ref 0.2–1.2)
MONOCYTES NFR BLD AUTO: 10.7 % (ref 5–12)
NEUTROPHILS # BLD AUTO: 1.6 10*3/MM3 (ref 1.9–8.1)
NEUTROPHILS NFR BLD AUTO: 52.2 % (ref 42.7–76)
PLATELET # BLD AUTO: 177 10*3/MM3 (ref 140–500)
PMV BLD AUTO: 10.4 FL (ref 6–12)
POTASSIUM BLD-SCNC: 3.8 MMOL/L (ref 3.5–5.2)
RBC # BLD AUTO: 3.64 10*6/MM3 (ref 3.9–5.2)
REF LAB TEST RESULTS: NORMAL
SODIUM BLD-SCNC: 136 MMOL/L (ref 136–145)
STRESS TARGET HR: 111 BPM
WBC NRBC COR # BLD: 3.07 10*3/MM3 (ref 4.5–10.7)

## 2018-02-18 PROCEDURE — 94799 UNLISTED PULMONARY SVC/PX: CPT

## 2018-02-18 PROCEDURE — 82962 GLUCOSE BLOOD TEST: CPT

## 2018-02-18 PROCEDURE — 93306 TTE W/DOPPLER COMPLETE: CPT

## 2018-02-18 PROCEDURE — 93306 TTE W/DOPPLER COMPLETE: CPT | Performed by: INTERNAL MEDICINE

## 2018-02-18 PROCEDURE — 80048 BASIC METABOLIC PNL TOTAL CA: CPT | Performed by: INTERNAL MEDICINE

## 2018-02-18 PROCEDURE — 85025 COMPLETE CBC W/AUTO DIFF WBC: CPT | Performed by: INTERNAL MEDICINE

## 2018-02-18 PROCEDURE — 99232 SBSQ HOSP IP/OBS MODERATE 35: CPT | Performed by: INTERNAL MEDICINE

## 2018-02-18 RX ADMIN — ALBUTEROL SULFATE 2.5 MG: 2.5 SOLUTION RESPIRATORY (INHALATION) at 08:53

## 2018-02-18 RX ADMIN — PANTOPRAZOLE SODIUM 40 MG: 40 TABLET, DELAYED RELEASE ORAL at 08:57

## 2018-02-18 RX ADMIN — SODIUM CHLORIDE 100 ML/HR: 9 INJECTION, SOLUTION INTRAVENOUS at 08:57

## 2018-02-18 RX ADMIN — SODIUM CHLORIDE 75 ML/HR: 9 INJECTION, SOLUTION INTRAVENOUS at 22:59

## 2018-02-18 RX ADMIN — AMLODIPINE BESYLATE 5 MG: 5 TABLET ORAL at 08:56

## 2018-02-18 RX ADMIN — LISINOPRIL 40 MG: 40 TABLET ORAL at 08:56

## 2018-02-18 RX ADMIN — ALBUTEROL SULFATE 2.5 MG: 2.5 SOLUTION RESPIRATORY (INHALATION) at 20:38

## 2018-02-18 RX ADMIN — ROSUVASTATIN CALCIUM 5 MG: 5 TABLET, FILM COATED ORAL at 08:56

## 2018-02-18 NOTE — PLAN OF CARE
"Problem: Patient Care Overview (Adult)  Goal: Plan of Care Review  Outcome: Ongoing (interventions implemented as appropriate)   02/18/18 1117   Coping/Psychosocial Response Interventions   Plan Of Care Reviewed With patient   Patient Care Overview   Progress improving   Outcome Evaluation   Outcome Summary/Follow up Plan VSS. Telemetry monitor, SR. Patient stated her stomach felt \"upset\" but refused any medications. Up with assist, very weak. Echo performed today. IV fluids. Will continue to monitor.        Problem: Fall Risk (Adult)  Goal: Absence of Falls  Outcome: Ongoing (interventions implemented as appropriate)      Problem: Respiratory Insufficiency (Adult)  Goal: Acid/Base Balance  Outcome: Ongoing (interventions implemented as appropriate)    Goal: Effective Ventilation  Outcome: Ongoing (interventions implemented as appropriate)        "

## 2018-02-18 NOTE — PLAN OF CARE
Problem: Patient Care Overview (Adult)  Goal: Plan of Care Review  Outcome: Ongoing (interventions implemented as appropriate)   02/18/18 0330   Coping/Psychosocial Response Interventions   Plan Of Care Reviewed With patient   Patient Care Overview   Progress improving   Outcome Evaluation   Outcome Summary/Follow up Plan reported one episode of nausea which was relieved with 4mg IV Zofran, sleeping well, up with one assist to BR, gait slightly unsteady, overall generalized weakness; enc po intake; SR to SB on monitor, sats declined during sleep to as low as 70 briefly x1, and then 80's very briefly both episodes 30sec -1.5mins.; oxygen at 2L set up at bs if needed throughout the night; hob elevated 30-45degrees; MD has noted desaturation in notes and plan for Echo today; may require outpatient heart monitor pending echo results.      Goal: Adult Individualization and Mutuality  Outcome: Ongoing (interventions implemented as appropriate)      Problem: Fall Risk (Adult)  Goal: Absence of Falls  Outcome: Ongoing (interventions implemented as appropriate)      Problem: Respiratory Insufficiency (Adult)  Goal: Acid/Base Balance  Outcome: Ongoing (interventions implemented as appropriate)    Goal: Effective Ventilation  Outcome: Ongoing (interventions implemented as appropriate)

## 2018-02-18 NOTE — PROGRESS NOTES
Hospital Follow Up    LOS:  LOS: 2 days   Patient Name: Cristal Sams  Age/Sex: 89 y.o. female  : 1927  MRN: [id number]    Day of Service: 18   Length of Stay: 2  Encounter Provider: Irwin Thorne MD  Place of Service: [facility] CARDIOLOGY  Patient Care Team:  George Rock MD as PCP - General  Pastor Crews MD as Consulting Physician (Gastroenterology)  George Rock MD as Referring Physician (Internal Medicine)  Milly Penaloza MD as Consulting Physician (Hematology and Oncology)    Subjective:     Chief Complaint: Bradycardia/syncope    Interval History: Patient still feels poorly overall    Objective:     Objective:  Temp:  [98.4 °F (36.9 °C)-99 °F (37.2 °C)] 99 °F (37.2 °C)  Heart Rate:  [54-77] 61  Resp:  [14-20] 16  BP: (124-163)/(56-80) 163/80     Intake/Output Summary (Last 24 hours) at 18 1133  Last data filed at 18 0700   Gross per 24 hour   Intake             3098 ml   Output             1150 ml   Net             1948 ml     Body mass index is 20.25 kg/(m^2).  Last 3 weights    18  1509 18   Weight: 44 kg (97 lb) 44 kg (96 lb 14.4 oz)     Weight change:       Physical Exam:   General : Alert, cooperative, in no acute distress.  Neuro: alert,cooperative and oriented  Lungs: CTAB. Normal respiratory effort and rate.  CV:: Regular rate and rhythm, normal S1 and S2, no murmurs, gallops or rubs.  ABD: Soft, nontender, non-distended. positive bowel sounds  Extr: No edema or cyanosis, moves all extremities    Lab Review:     Results from last 7 days  Lab Units 18  0632 18  0446 18  1541   SODIUM mmol/L 136 131* 128*   POTASSIUM mmol/L 3.8 3.9 4.3   CHLORIDE mmol/L 99 96* 91*   CO2 mmol/L 24.5 23.4 24.6   BUN mg/dL 7* 11 13   CREATININE mg/dL 0.57 0.61 0.77   GLUCOSE mg/dL 90 87 103*   CALCIUM mg/dL 8.1* 8.3 8.8   AST (SGOT) U/L  --   --  23   ALT (SGPT) U/L  --   --  13       Results from last 7  days  Lab Units 02/16/18  1541   TROPONIN T ng/mL <0.010       Results from last 7 days  Lab Units 02/18/18  0632 02/17/18  0446   WBC 10*3/mm3 3.07* 3.28*   HEMOGLOBIN g/dL 11.1* 10.8*   HEMATOCRIT % 34.6* 33.2*   PLATELETS 10*3/mm3 177 188                   Invalid input(s): LDLCALC        Results from last 7 days  Lab Units 02/17/18  0446   TSH mIU/mL 1.740     I reviewed the patient's new clinical results.  I personally viewed and interpreted the patient's EKG  Current Medications:   Scheduled Meds:  albuterol 2.5 mg Nebulization BID - RT   amLODIPine 5 mg Oral Daily   insulin aspart 0-7 Units Subcutaneous 4x Daily With Meals & Nightly   lisinopril 40 mg Oral Daily   pantoprazole 40 mg Oral QAM   rosuvastatin 5 mg Oral Daily     Continuous Infusions:  sodium chloride 100 mL/hr Last Rate: 100 mL/hr (02/18/18 0857)       Allergies:  Allergies   Allergen Reactions   • Contrast Dye    • Novocain [Procaine] Palpitations and Parasthesia     LEG GET NUMB   • Aleve [Naproxen Sodium] GI Intolerance   • Cortizone-10 [Hydrocortisone] Other (See Comments)     UNSURE   • Eye Drops [Naphazoline-Polyethyl Glycol] Other (See Comments)     UNSURE   • Lipitor [Atorvastatin] Other (See Comments)     UNSURE   • Sulfur Other (See Comments)     UNSURE   • Valium [Diazepam] Other (See Comments)     Doesn't like the way it makes her feel   • Zocor [Simvastatin] Other (See Comments)     UNSURE   • Zyrtec [Cetirizine] Other (See Comments)     UNSURE       Assessment:     Principal Problem:    Syncope  Active Problems:    Essential hypertension    DM (diabetes mellitus), type 2    Influenza B    Hyponatremia    Hypoxia    Bradycardia    Convulsions        Plan:      1.  Influenza B  2.  Bradycardia stable  3.  Echo currently in process of being done.  4.  Continue supportive care cardiovascular status appears stable at this point     Irwin Thorne MD  02/18/18  11:33 AM

## 2018-02-18 NOTE — PROGRESS NOTES
Name: Cristal Sams ADMIT: 2018   : 1927  PCP: George Rock MD    MRN: [id number] LOS: 2 days   AGE/SEX: 89 y.o. female  ROOM: [facility]/1   Subjective   Subjective  Cc- cough    Cough is better  Very weak  desatting at times  Some nausea earlier  Improved with zofran    ROS  No f/c  +n/-v  No cp/palp  No soa/cough  Objective   Vital Signs  Temp:  [98.1 °F (36.7 °C)-99 °F (37.2 °C)] 98.1 °F (36.7 °C)  Heart Rate:  [56-77] 56  Resp:  [14-20] 14  BP: (135-163)/(56-80) 141/57  SpO2:  [70 %-95 %] 94 %  on   ;   O2 Device: room air  Body mass index is 24.03 kg/(m^2).    Physical Exam    Alert  Supple, no jvd  Elderly, some muscle atrophy  RRR, no murmurs  Soft, nt  No edema or cyanosis  Lungs clear no resp distress  Pleasant, appropriate    Results Review:       I reviewed the patient's new clinical results.    Results from last 7 days  Lab Units 18  0632 18  0446 18  1541 18  1408   WBC 10*3/mm3 3.07* 3.28* 3.71* 4.60   HEMOGLOBIN g/dL 11.1* 10.8* 11.0* 11.7*   PLATELETS 10*3/mm3 177 188 211 219       Results from last 7 days  Lab Units 18  0632 18  0446 18  1541   SODIUM mmol/L 136 131* 128*   POTASSIUM mmol/L 3.8 3.9 4.3   CHLORIDE mmol/L 99 96* 91*   CO2 mmol/L 24.5 23.4 24.6   BUN mg/dL 7* 11 13   CREATININE mg/dL 0.57 0.61 0.77   GLUCOSE mg/dL 90 87 103*   Estimated Creatinine Clearance: 32.1 mL/min (by C-G formula based on Cr of 0.57).    Results from last 7 days  Lab Units 18  0632 18  0446 18  1541   CALCIUM mg/dL 8.1* 8.3 8.8   ALBUMIN g/dL  --   --  3.70     MRI Brain With & Without Contrast [[id number]] Not Reviewed        Order Status: Completed Collected: 18        Updated: 18       Narrative:         MRI BRAIN W WO CONTRAST-     INDICATIONS: Seizures     TECHNIQUE: Multiplanar multisequence magnetic resonance imaging of the  brain, without and with intravenous contrast material.     COMPARISON: Head CT  from 02/16/2018     FINDINGS:     The diffusion-weighted images show no restricted diffusion to suggest  acute infarct. Small encephalomalacia/old infarct change is apparent  laterally adjacent to the posterior horn of the left lateral ventricle.        The midline structures appear unremarkable.     The brain parenchyma shows moderate periventricular white matter T2  FLAIR signal hyperintensity suggesting chronic small vessel ischemic  change in a patient this age. A few scattered small foci of  susceptibility artifact in the bilateral cerebrum, nonspecific, could  for example be evidence of amyloid angiopathy or sequela of old  hypertensive microhemorrhages. No enhancing lesions of brain are  identified. Vascular flow related artifact is present on the  postcontrast images.     Flow voids in the major arteries at the base of the brain appear  unremarkable.     Partial opacification of the sphenoid sinus. The paranasal sinuses,  mastoid air cells, and orbits otherwise appear unremarkable.          Impression:         No acute infarct. No enhancing lesion in brain. Chronic-appearing  changes. Follow-up as indications persist.           This report was finalized on 2/17/2018 9:15 AM by Dr. Levon Grissom MD.          XR Chest 2 View [[id number]] Not Reviewed       Order Status: Completed Collected: 02/16/18 2113        Updated: 02/17/18 1153       Narrative:         EMERGENCY PA AND LATERAL CHEST X-RAY 02/16/2018     CLINICAL HISTORY: Cough, diagnosed with flu today.      COMPARISON: Correlated to prior chest CT 01/26/2018.     FINDINGS: There are clips in the left axilla from a previous left  axillary dissection. There has been a previous left mastectomy. The  cardiomediastinal silhouette and pulmonary vasculature are within normal  limits. There are scattered tiny noncalcified and calcified nodules in  the lungs likely granulomas. The remainder of the lungs are clear. The  costophrenic angles are sharp.           Impression:            1. No change when compared to chest CT from [facility] on 01/26/2018. No definite active disease is seen in the  chest. There has been a previous left mastectomy and left axillary  dissection.     This report was finalized on 2/17/2018 11:50 AM by Dr. George Balbuena MD.          CT Head Without Contrast [[id number]] Not Reviewed       Order Status: Completed Collected: 02/16/18 1612        Updated: 02/16/18 1622       Narrative:         CT HEAD WITHOUT CONTRAST     CLINICAL HISTORY: Syncope versus seizure.     CT scan of the head was obtained with 3 mm axial images. No intravenous  contrast was administered.     FINDINGS:     The ventricles, sulci, and cisterns are age appropriate. There are  mild-to-moderate changes of chronic small vessel ischemic phenomena.  There is either an enlarged perivascular space or chronic infarct within  the right aspect of the midbrain measuring up to 3 mm in diameter.  Otherwise, the posterior fossa structures are within normal limits.     The extracranial structures are incidentally notable for mucosal  thickening within the sphenoid sinus. There is a mucous retention cyst  within the right aspect of the sphenoid sinus.          Impression:            No evidence for acute intracranial pathology. There is no convincing  evidence to suggest a seizure focus. However, further evaluation could  be performed with MR imaging for much more sensitive and specific  detection of a potential seizure focus if clinically indicated. These  findings and recommendations were directly discussed with Dr. Serrano on  02/16/2018 at approximately 4:18 PM.               albuterol 2.5 mg Nebulization BID - RT   amLODIPine 5 mg Oral Daily   insulin aspart 0-7 Units Subcutaneous 4x Daily With Meals & Nightly   lisinopril 40 mg Oral Daily   pantoprazole 40 mg Oral QAM   rosuvastatin 5 mg Oral Daily       sodium chloride 100 mL/hr Last Rate: 100 mL/hr (02/18/18 0857)    Diet Regular; Consistent Carbohydrate      Assessment/Plan   Active Hospital Problems (** Indicates Principal Problem)    Diagnosis Date Noted   • **Syncope [R55] 02/16/2018   • Neutropenia associated with infection [D70.3] 02/18/2018   • Hypoxia [R09.02] 02/17/2018   • Bradycardia [R00.1] 02/17/2018   • Convulsions [R56.9] 02/17/2018   • Influenza B [J10.1] 02/16/2018   • Hyponatremia [E87.1] 02/16/2018   • DM (diabetes mellitus), type 2 [E11.9] 12/16/2016   • Essential hypertension [I10] 12/16/2016      Resolved Hospital Problems    Diagnosis Date Noted Date Resolved   No resolved problems to display.       Ms. Sams is a 89 y.o. female who has had symptoms of influenza for a few days who has been admitted with syncope with convulsions at doctors office    · Suspect vaso-vagal syncope. She states had nausea before the episode and had been feeling ill due to the flu. Orthostatics reportedly negative.  · Cardiology following for bradycardia/syncope. ECHO pending  · Continue IVFs. Patient declined tamiflu. Does have some hypoxia - some supplemental O2 during hospitalization.   · Hyponatremia improving.  · Neutropenia likely related to flu, will follow    Reviewed previous records    Full code for now but patient to discuss with her family. I recommend DNR/DNI    Rodolfo Sykes MD  [facility]  02/18/18  2:24 PM

## 2018-02-18 NOTE — SIGNIFICANT NOTE
"   02/18/18 0948   Rehab Treatment   Discipline physical therapist   Rehab Evaluation   Evaluation Not Performed patient/family decline, not feeling well  (pt reports nausea and \"stomach hurts not feeling well.\" up in chair upon entering room. encouraged cont'ed mobility with nsg. will follow up tmrw. )   Recommendation   PT - Next Appointment 02/19/18     "

## 2018-02-19 ENCOUNTER — APPOINTMENT (OUTPATIENT)
Dept: GENERAL RADIOLOGY | Facility: HOSPITAL | Age: 83
End: 2018-02-19
Attending: INTERNAL MEDICINE

## 2018-02-19 PROBLEM — Z66 DNR (DO NOT RESUSCITATE): Status: ACTIVE | Noted: 2018-02-19

## 2018-02-19 PROBLEM — R11.2 NAUSEA & VOMITING: Status: ACTIVE | Noted: 2018-02-19

## 2018-02-19 LAB
BASOPHILS # BLD AUTO: 0.01 10*3/MM3 (ref 0–0.2)
BASOPHILS NFR BLD AUTO: 0.4 % (ref 0–1.5)
DEPRECATED RDW RBC AUTO: 44.6 FL (ref 37–54)
EOSINOPHIL # BLD AUTO: 0.01 10*3/MM3 (ref 0–0.7)
EOSINOPHIL NFR BLD AUTO: 0.4 % (ref 0.3–6.2)
ERYTHROCYTE [DISTWIDTH] IN BLOOD BY AUTOMATED COUNT: 12.8 % (ref 11.7–13)
GLUCOSE BLDC GLUCOMTR-MCNC: 102 MG/DL (ref 70–130)
GLUCOSE BLDC GLUCOMTR-MCNC: 110 MG/DL (ref 70–130)
GLUCOSE BLDC GLUCOMTR-MCNC: 113 MG/DL (ref 70–130)
GLUCOSE BLDC GLUCOMTR-MCNC: 88 MG/DL (ref 70–130)
HCT VFR BLD AUTO: 33.9 % (ref 35.6–45.5)
HGB BLD-MCNC: 10.8 G/DL (ref 11.9–15.5)
IMM GRANULOCYTES # BLD: 0 10*3/MM3 (ref 0–0.03)
IMM GRANULOCYTES NFR BLD: 0 % (ref 0–0.5)
LYMPHOCYTES # BLD AUTO: 0.82 10*3/MM3 (ref 0.9–4.8)
LYMPHOCYTES NFR BLD AUTO: 35.3 % (ref 19.6–45.3)
MCH RBC QN AUTO: 30.1 PG (ref 26.9–32)
MCHC RBC AUTO-ENTMCNC: 31.9 G/DL (ref 32.4–36.3)
MCV RBC AUTO: 94.4 FL (ref 80.5–98.2)
MONOCYTES # BLD AUTO: 0.27 10*3/MM3 (ref 0.2–1.2)
MONOCYTES NFR BLD AUTO: 11.6 % (ref 5–12)
NEUTROPHILS # BLD AUTO: 1.21 10*3/MM3 (ref 1.9–8.1)
NEUTROPHILS NFR BLD AUTO: 52.3 % (ref 42.7–76)
PLATELET # BLD AUTO: 176 10*3/MM3 (ref 140–500)
PMV BLD AUTO: 10.6 FL (ref 6–12)
RBC # BLD AUTO: 3.59 10*6/MM3 (ref 3.9–5.2)
WBC NRBC COR # BLD: 2.32 10*3/MM3 (ref 4.5–10.7)

## 2018-02-19 PROCEDURE — 82962 GLUCOSE BLOOD TEST: CPT

## 2018-02-19 PROCEDURE — 97162 PT EVAL MOD COMPLEX 30 MIN: CPT

## 2018-02-19 PROCEDURE — 85025 COMPLETE CBC W/AUTO DIFF WBC: CPT | Performed by: INTERNAL MEDICINE

## 2018-02-19 PROCEDURE — 94799 UNLISTED PULMONARY SVC/PX: CPT

## 2018-02-19 PROCEDURE — 99233 SBSQ HOSP IP/OBS HIGH 50: CPT | Performed by: INTERNAL MEDICINE

## 2018-02-19 PROCEDURE — 97110 THERAPEUTIC EXERCISES: CPT

## 2018-02-19 PROCEDURE — 74022 RADEX COMPL AQT ABD SERIES: CPT

## 2018-02-19 PROCEDURE — 25010000002 FUROSEMIDE PER 20 MG: Performed by: INTERNAL MEDICINE

## 2018-02-19 RX ORDER — LISINOPRIL 20 MG/1
20 TABLET ORAL DAILY
Status: DISCONTINUED | OUTPATIENT
Start: 2018-02-20 | End: 2018-02-23 | Stop reason: HOSPADM

## 2018-02-19 RX ORDER — SCOLOPAMINE TRANSDERMAL SYSTEM 1 MG/1
1 PATCH, EXTENDED RELEASE TRANSDERMAL
Status: DISCONTINUED | OUTPATIENT
Start: 2018-02-19 | End: 2018-02-23 | Stop reason: HOSPADM

## 2018-02-19 RX ORDER — FUROSEMIDE 10 MG/ML
40 INJECTION INTRAMUSCULAR; INTRAVENOUS ONCE
Status: COMPLETED | OUTPATIENT
Start: 2018-02-19 | End: 2018-02-19

## 2018-02-19 RX ORDER — FAMOTIDINE 20 MG/1
20 TABLET, FILM COATED ORAL 2 TIMES DAILY
Status: DISCONTINUED | OUTPATIENT
Start: 2018-02-19 | End: 2018-02-21

## 2018-02-19 RX ADMIN — LISINOPRIL 40 MG: 40 TABLET ORAL at 08:31

## 2018-02-19 RX ADMIN — FUROSEMIDE 40 MG: 10 INJECTION, SOLUTION INTRAMUSCULAR; INTRAVENOUS at 10:44

## 2018-02-19 RX ADMIN — SODIUM CHLORIDE 75 ML/HR: 9 INJECTION, SOLUTION INTRAVENOUS at 12:02

## 2018-02-19 RX ADMIN — ALBUTEROL SULFATE 2.5 MG: 2.5 SOLUTION RESPIRATORY (INHALATION) at 08:21

## 2018-02-19 RX ADMIN — PANTOPRAZOLE SODIUM 40 MG: 40 TABLET, DELAYED RELEASE ORAL at 06:10

## 2018-02-19 RX ADMIN — AMLODIPINE BESYLATE 5 MG: 5 TABLET ORAL at 08:31

## 2018-02-19 RX ADMIN — FAMOTIDINE 20 MG: 20 TABLET, FILM COATED ORAL at 21:33

## 2018-02-19 RX ADMIN — ROSUVASTATIN CALCIUM 5 MG: 5 TABLET, FILM COATED ORAL at 08:31

## 2018-02-19 RX ADMIN — ALBUTEROL SULFATE 2.5 MG: 2.5 SOLUTION RESPIRATORY (INHALATION) at 19:53

## 2018-02-19 RX ADMIN — SCOPALAMINE 1 PATCH: 1 PATCH, EXTENDED RELEASE TRANSDERMAL at 16:09

## 2018-02-19 NOTE — PLAN OF CARE
Problem: Patient Care Overview (Adult)  Goal: Plan of Care Review   02/19/18 0901   Coping/Psychosocial Response Interventions   Plan Of Care Reviewed With patient   Outcome Evaluation   Outcome Summary/Follow up Plan Pt presents from home w/syncopal episode, possible seizure, influenza. Upon exam, pt demonstrates generalized weakness, impaired balance, and decreased endurance limiting mobility. At baseline, pt is independent without AD; currently requiring assist of one for safety. Attempted ambulation w/o AD, but pt unsteady and reaching for furniture/walls. Pt demonstrates improved balance and increased independence with use of rolling walker; recommend walker at this time for safety. Pt would benefit from continued PT to address impairments and assist w/safe transition home. May also benefit from HH PT for further strengthening and balance training.        Problem: Inpatient Physical Therapy  Goal: Bed Mobility Goal LTG- PT   02/19/18 0901   Bed Mobility PT LTG   Bed Mobility PT LTG, Date Established 02/19/18   Bed Mobility PT LTG, Time to Achieve 1 wk   Bed Mobility PT LTG, Activity Type all bed mobility   Bed Mobility PT LTG, [place] Level conditional independence     Goal: Transfer Training Goal 1 LTG- PT   02/19/18 0901   Transfer Training PT LTG   Transfer Training PT LTG, Date Established 02/19/18   Transfer Training PT LTG, Time to Achieve 1 wk   Transfer Training PT LTG, Activity Type all transfers   Transfer Training PT LTG, [place] Level supervision required   Transfer Training PT LTG, Assist Device walker, rolling     Goal: Gait Training Goal LTG- PT   02/19/18 0901   Gait Training PT LTG   Gait Training Goal PT LTG, Date Established 02/19/18   Gait Training Goal PT LTG, Time to Achieve 1 wk   Gait Training Goal PT LTG, [place] Level supervision required   Gait Training Goal PT LTG, Assist Device walker, rolling   Gait Training Goal PT LTG, Distance to Achieve 150

## 2018-02-19 NOTE — PROGRESS NOTES
Hospital Follow Up    LOS:  LOS: 3 days   Patient Name: Cristal Sams  Age/Sex: 89 y.o. female  : 1927  MRN: [id number]    Day of Service: 18   Length of Stay: 3  Encounter Provider: Irwin Thorne MD  Place of Service: [facility] CARDIOLOGY  Patient Care Team:  George Rock MD as PCP - General  Pastor Crews MD as Consulting Physician (Gastroenterology)  George Rock MD as Referring Physician (Internal Medicine)  Milly Penaloza MD as Consulting Physician (Hematology and Oncology)    Subjective:     Chief Complaint: Syncope/bradycardia    Interval History: Patient nods much better today.  Infection looks weaker than she did yesterday and just does not turning around clinically.    Objective:     Objective:  Temp:  [97.2 °F (36.2 °C)-99.6 °F (37.6 °C)] 99.6 °F (37.6 °C)  Heart Rate:  [56-68] 66  Resp:  [14-18] 16  BP: (132-163)/(57-80) 153/65     Intake/Output Summary (Last 24 hours) at 18 0905  Last data filed at 18 0400   Gross per 24 hour   Intake             1210 ml   Output             3240 ml   Net            -2030 ml     Body mass index is 24.03 kg/(m^2).  Last 3 weights    18  1509 18  2053 18  1208   Weight: 44 kg (97 lb) 44 kg (96 lb 14.4 oz) 43.5 kg (96 lb)     Weight change:       Physical Exam:   General : Extremely weak.  Neuro: alert,cooperative and oriented  Lungs: CTAB. Normal respiratory effort and rate.  CV:: Rhonchi and wheezes.  ABD: Soft, nontender, non-distended. positive bowel sounds  Extr: No edema or cyanosis, moves all extremities    Lab Review:     Results from last 7 days  Lab Units 18  0632 18  0446 18  1541   SODIUM mmol/L 136 131* 128*   POTASSIUM mmol/L 3.8 3.9 4.3   CHLORIDE mmol/L 99 96* 91*   CO2 mmol/L 24.5 23.4 24.6   BUN mg/dL 7* 11 13   CREATININE mg/dL 0.57 0.61 0.77   GLUCOSE mg/dL 90 87 103*   CALCIUM mg/dL 8.1* 8.3 8.8   AST (SGOT) U/L  --   --  23   ALT  (SGPT) U/L  --   --  13       Results from last 7 days  Lab Units 02/16/18  1541   TROPONIN T ng/mL <0.010       Results from last 7 days  Lab Units 02/19/18  0551 02/18/18  0632   WBC 10*3/mm3 2.32* 3.07*   HEMOGLOBIN g/dL 10.8* 11.1*   HEMATOCRIT % 33.9* 34.6*   PLATELETS 10*3/mm3 176 177                   Invalid input(s): LDLCALC        Results from last 7 days  Lab Units 02/17/18  0446   TSH mIU/mL 1.740       Current Medications:   Scheduled Meds:  albuterol 2.5 mg Nebulization BID - RT   amLODIPine 5 mg Oral Daily   insulin aspart 0-7 Units Subcutaneous 4x Daily With Meals & Nightly   lisinopril 40 mg Oral Daily   pantoprazole 40 mg Oral QAM   rosuvastatin 5 mg Oral Daily     Continuous Infusions:  sodium chloride 75 mL/hr Last Rate: 75 mL/hr (02/18/18 9261)       Allergies:  Allergies   Allergen Reactions   • Contrast Dye    • Novocain [Procaine] Palpitations and Parasthesia     LEG GET NUMB   • Aleve [Naproxen Sodium] GI Intolerance   • Cortizone-10 [Hydrocortisone] Other (See Comments)     UNSURE   • Eye Drops [Naphazoline-Polyethyl Glycol] Other (See Comments)     UNSURE   • Lipitor [Atorvastatin] Other (See Comments)     UNSURE   • Sulfur Other (See Comments)     UNSURE   • Valium [Diazepam] Other (See Comments)     Doesn't like the way it makes her feel   • Zocor [Simvastatin] Other (See Comments)     UNSURE   • Zyrtec [Cetirizine] Other (See Comments)     UNSURE       Assessment:     Principal Problem:    Syncope  Active Problems:    Essential hypertension    DM (diabetes mellitus), type 2    Influenza B    Hyponatremia    Hypoxia    Bradycardia    Convulsions    Neutropenia associated with infection        Plan:       1.  Bradycardia heart rate remained stable do not think this is an issue.  2.  Agree with probably vasovagal syncope.  3.  Influenza  4.  Neutropenia  5.  Unfortunately no significant clinical improvement.  Agree with Dr. Sykes about need to make patient DO NOT RESUSCITATE.  May even  need to consider palliative care if patient does not continue to improve.  I am when he given a dose of Lasix just to see if it'll turn around some and decrease her congestion in her lungs.    Irwin Thorne MD  02/19/18  9:05 AM

## 2018-02-19 NOTE — THERAPY EVALUATION
Acute Care - Physical Therapy Initial Evaluation  [facility]     Patient Name: Cristal Sams  : 1927  MRN: [id number]  Today's Date: 2018   Onset of Illness/Injury or Date of Surgery Date: 18     Referring Physician: Edgar      Admit Date: 2018     Visit Dx:    ICD-10-CM ICD-9-CM   1. Influenza B J10.1 487.1   2. Seizure, potential R56.9 780.39   3. 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, estrogen receptor positive   • Influenza B   • Syncope   • Hyponatremia   • Hypoxia   • Bradycardia   • Convulsions   • Neutropenia associated with infection     Past Medical History:   Diagnosis Date   • Anemia    • Arthritis    • Breast cancer     LEFT   • Colon polyp    • Diabetes mellitus     DIET CONTROLLED   • GERD (gastroesophageal reflux disease)    • H/O Cataract    • Hiatal hernia    • History of hepatitis    • History of kidney stone    • History of peptic ulcer    • History of vertigo    • Hyperlipidemia    • Hypertension    • Scoliosis    • Unexplained weight loss     #43 lb in 3 months    • Visual impairment      Past Surgical History:   Procedure Laterality Date   • CATARACT EXTRACTION Bilateral    • CATARACT EXTRACTION     • COLONOSCOPY W/ BIOPSIES     • DILATATION AND CURETTAGE     • EYE SURGERY      cataract extraction   • MASTECTOMY WITH SENTINEL NODE BIOPSY AND AXILLARY NODE DISSECTION Left 2017    Procedure:  left total mastectomy, left axillary sentinel lymph node biopsy x 3 ;  Surgeon: Madison Ponce MD;  Location: [facility] OR [facility] [place];  Service:    • SINUS SURGERY       "CAUTERIZE A BLEED          PT ASSESSMENT (last 72 hours)      PT Evaluation       02/19/18 0840 02/18/18 0948    Rehab Evaluation    Document Type evaluation  -EE     Subjective Information agree to therapy;complains of;weakness;fatigue  -EE     Evaluation Not Performed  patient/family decline, not feeling well   pt reports nausea and \"stomach hurts not feeling well.\" up in chair upon entering room. encouraged cont'ed mobility with nsg. will follow up tmrw.   -    Patient Effort, Rehab Treatment good  -EE     Symptoms Noted During/After Treatment none  -EE     General Information    Onset of Illness/Injury or Date of Surgery Date 02/16/18  -EE     Referring Physician Ray  -EE     General Observations Pt supine in bed in no acute distress  -EE     Pertinent History Of Current Problem Admitted w/syncope and possible seizure; flu  -EE     Precautions/Limitations fall precautions;other (see comments)   droplet precautions for flu  -EE     Prior Level of Function independent:;community mobility;all household mobility  -EE     Equipment Currently Used at Home none  -EE     Plans/Goals Discussed With patient;agreed upon  -EE     Barriers to Rehab none identified  -EE     Living Environment    Lives With sibling(s)  -EE     Living Arrangements house  -EE     Home Accessibility no concerns  -EE     Clinical Impression    Patient/Family Goals Statement Go home  -EE     Criteria for Skilled Therapeutic Interventions Met yes;treatment indicated  -EE     Pathology/Pathophysiology Noted (Describe Specifically for Each System) musculoskeletal  -EE     Impairments Found (describe specific impairments) gait, locomotion, and balance  -EE     Rehab Potential good, to achieve stated therapy goals  -EE     Pain Assessment    Pain Assessment No/denies pain  -EE     Cognitive Assessment/Intervention    Current Cognitive/Communication Assessment functional  -EE     Orientation Status oriented x 4  -EE     Follows Commands/Answers " Questions 100% of the time  -EE     Personal Safety mild impairment  -EE     Personal Safety Interventions fall prevention program maintained;gait belt;muscle strengthening facilitated;nonskid shoes/slippers when out of bed;supervised activity  -EE     ROM (Range of Motion)    General ROM no range of motion deficits identified  -EE     MMT (Manual Muscle Testing)    General MMT Assessment lower extremity strength deficits identified  -EE     General MMT Assessment Detail generalized weakness; B LEs grossly 4-/5  -EE     Bed Mobility, Assessment/Treatment    Bed Mob, Supine to Sit, [place] supervision required  -EE     Bed Mob, Sit to Supine, [place] minimum assist (75% patient effort)  -EE     Bed Mobility, Impairments strength decreased  -EE     Transfer Assessment/Treatment    Transfers, Sit-Stand [place] contact guard assist  -EE     Transfers, Stand-Sit [place] contact guard assist  -EE     Toilet Transfer, [place] contact guard assist;minimum assist (75% patient effort);verbal cues required  -EE     Toilet Transfer, Assistive Device other (see comments)   grab bar  -EE     Transfer, Impairments strength decreased;impaired balance  -EE     Gait Assessment/Treatment    Gait, [place] Level contact guard assist;minimum assist (75% patient effort);verbal cues required  -EE     Gait, Assistive Device rolling walker  -EE     Gait, Distance (Feet) 110  -EE     Gait, Gait Deviations [place] decreased;decreased heel strike;step length decreased;narrow base  -EE     Gait, Safety Issues step length decreased;balance decreased during turns  -EE     Gait, Impairments strength decreased;impaired balance  -EE     Gait, Comment Ambulated in room w/o AD; pt reaching for walls/furniture. Improved balance observed with use of rwx.  -EE     Motor Skills/Interventions    Additional Documentation Balance Skills Training (Group)  -EE     Balance Skills Training    Sitting-Level of Assistance Distant  supervision  -EE     Sitting-Balance Support Feet supported  -EE     Standing-Level of Assistance Contact guard  -EE     Static Standing Balance Support assistive device  -EE     Gait Balance-Level of Assistance Contact guard  -EE     Gait Balance Support assistive device  -EE     Positioning and Restraints    Pre-Treatment Position in bed  -EE     Post Treatment Position bed  -EE     In Bed fowlers;call light within reach;encouraged to call for assist;exit alarm on  -EE       02/17/18 0850 02/16/18 2321    Rehab Evaluation    Evaluation Not Performed patient unavailable for evaluation   off floor MRI, neuro consult. will follow up.   -     General Information    Equipment Currently Used at Home  none  -SM    Living Environment    Transportation Available  car;family or friend will provide  -SM      02/16/18 2118 02/16/18 2116    General Information    Equipment Currently Used at Home  none  -DA    Living Environment    Lives With sibling(s)   lives with sister  -DA     Living Arrangements house  -DA     Home Accessibility no concerns  -DA     Stair Railings at Home none  -DA     Type of Financial/Environmental Concern none  -DA     Transportation Available family or friend will provide  -DA       User Key  (r) = Recorded By, (t) = Taken By, (c) = Cosigned By    Initials Name Provider Type     Amelia Gaffney, PT Physical Therapist    DA Sofia Corona, HAYDER Registered Nurse    SONI Lezama, PT Physical Therapist    RYAN Nova RN Registered Nurse          Physical Therapy Education     Title: PT OT SLP Therapies (Active)     Topic: Physical Therapy (Active)     Point: Mobility training (Done)    Learning Progress Summary    Learner Readiness Method Response Comment Documented by Status   Patient Acceptance E NR,VU  EE 02/19/18 0901 Done               Point: Body mechanics (Done)    Learning Progress Summary    Learner Readiness Method Response Comment Documented by Status   Patient Acceptance E NR,VU  EE  02/19/18 0901 Done                      User Key     Initials Effective Dates Name Provider Type Discipline    EE 12/01/15 -  Maria Guadalupe Lezama, PT Physical Therapist PT                PT Recommendation and Plan  Anticipated Equipment Needs At Discharge: front wheeled walker (rec use of walker for improved balance)  Anticipated Discharge Disposition: home with assist, home with home health  Planned Therapy Interventions: balance training, bed mobility training, gait training, home exercise program, patient/family education, strengthening, transfer training  PT Frequency: daily  Plan of Care Review  Plan Of Care Reviewed With: patient  Outcome Summary/Follow up Plan: Pt presents from home w/syncopal episode, possible seizure, influenza. Upon exam, pt demonstrates generalized weakness, impaired balance, and decreased endurance limiting mobility. At baseline, pt is independent without AD; currently requiring assist of one for safety. Attempted ambulation w/o AD, but pt unsteady and reaching for furniture/walls. Pt demonstrates improved balance and increased independence with use of rolling walker; recommend walker at this time for safety. Pt would benefit from continued PT to address impairments and assist w/safe transition home. May also benefit from  PT for further strengthening and balance training.           IP PT Goals       02/19/18 0901          Bed Mobility PT LTG    Bed Mobility PT LTG, Date Established 02/19/18  -EE      Bed Mobility PT LTG, Time to Achieve 1 wk  -EE      Bed Mobility PT LTG, Activity Type all bed mobility  -EE      Bed Mobility PT LTG, [place] Level conditional independence  -EE      Transfer Training PT LTG    Transfer Training PT LTG, Date Established 02/19/18  -EE      Transfer Training PT LTG, Time to Achieve 1 wk  -EE      Transfer Training PT LTG, Activity Type all transfers  -EE      Transfer Training PT LTG, [place] Level supervision required  -EE      Transfer Training PT LTG,  Assist Device walker, rolling  -EE      Gait Training PT LTG    Gait Training Goal PT LTG, Date Established 02/19/18  -EE      Gait Training Goal PT LTG, Time to Achieve 1 wk  -EE      Gait Training Goal PT LTG, Day Level supervision required  -EE      Gait Training Goal PT LTG, Assist Device walker, rolling  -EE      Gait Training Goal PT LTG, Distance to Achieve 150  -EE        User Key  (r) = Recorded By, (t) = Taken By, (c) = Cosigned By    Initials Name Provider Type    EE Maria Guadalupe Lezama PT Physical Therapist                Outcome Measures       02/19/18 0900          How much help from another person do you currently need...    Turning from your back to your side while in flat bed without using bedrails? 4  -EE      Moving from lying on back to sitting on the side of a flat bed without bedrails? 3  -EE      Moving to and from a bed to a chair (including a wheelchair)? 3  -EE      Standing up from a chair using your arms (e.g., wheelchair, bedside chair)? 3  -EE      Climbing 3-5 steps with a railing? 2  -EE      To walk in hospital room? 3  -EE      AM-PAC 6 Clicks Score 18  -EE      Functional Assessment    Outcome Measure Options AM-PAC 6 Clicks Basic Mobility (PT)  -EE        User Key  (r) = Recorded By, (t) = Taken By, (c) = Cosigned By    Initials Name Provider Type    SONI Lezama PT Physical Therapist           Time Calculation:         PT Charges       02/19/18 0903          Time Calculation    Start Time 0840  -EE      Stop Time 0857  -EE      Time Calculation (min) 17 min  -EE      PT Received On 02/19/18  -EE      PT - Next Appointment 02/20/18  -EE      PT Goal Re-Cert Due Date 02/26/18  -EE        User Key  (r) = Recorded By, (t) = Taken By, (c) = Cosigned By    Initials Name Provider Type    SONI Lezama PT Physical Therapist          Therapy Charges for Today     Code Description Service Date Service Provider Modifiers Qty    [id number]  PT EVAL MOD COMPLEXITY 2 2/19/2018 Maria Guadalupe  Teja, PT GP 1    55087212382 HC PT THER PROC EA 15 MIN 2/19/2018 Maria Guadalupe Lezama, PT GP 1          PT G-Codes  Outcome Measure Options: AM-PAC 6 Clicks Basic Mobility (PT)      Maria Guadalupe Lezama, PT  2/19/2018

## 2018-02-19 NOTE — PLAN OF CARE
Problem: Patient Care Overview (Adult)  Goal: Plan of Care Review  Outcome: Ongoing (interventions implemented as appropriate)   02/19/18 0325   Coping/Psychosocial Response Interventions   Plan Of Care Reviewed With patient   Patient Care Overview   Progress improving   Outcome Evaluation   Outcome Summary/Follow up Plan Pt A&O x4, vitals stable, room air, no nausea, no pain reported, generalized weekness reported, up with assist to bathroom, IV fluids continue, no sticks or BP in LEFT arm, bed alarm in use, SCD in use     Goal: Adult Individualization and Mutuality  Outcome: Ongoing (interventions implemented as appropriate)      Problem: Fall Risk (Adult)  Goal: Absence of Falls  Outcome: Ongoing (interventions implemented as appropriate)      Problem: Respiratory Insufficiency (Adult)  Goal: Acid/Base Balance  Outcome: Ongoing (interventions implemented as appropriate)    Goal: Effective Ventilation  Outcome: Ongoing (interventions implemented as appropriate)

## 2018-02-19 NOTE — PLAN OF CARE
Problem: Patient Care Overview (Adult)  Goal: Plan of Care Review  Outcome: Ongoing (interventions implemented as appropriate)   02/19/18 1544   Coping/Psychosocial Response Interventions   Plan Of Care Reviewed With patient   Patient Care Overview   Progress improving   Outcome Evaluation   Outcome Summary/Follow up Plan VSS. Telemetry monitor, NSR. Up with assist, very weak. Complains of intermittent nausea, but refused medication. Abdominal x ray in process. IV fluids. Will continue to monitor.        Problem: Fall Risk (Adult)  Goal: Absence of Falls  Outcome: Ongoing (interventions implemented as appropriate)      Problem: Respiratory Insufficiency (Adult)  Goal: Acid/Base Balance  Outcome: Ongoing (interventions implemented as appropriate)    Goal: Effective Ventilation  Outcome: Ongoing (interventions implemented as appropriate)

## 2018-02-19 NOTE — PROGRESS NOTES
Name: Cristal Sams ADMIT: 2018   : 1927  PCP: George Rock MD    MRN: [id number] LOS: 3 days   AGE/SEX: 89 y.o. female  ROOM: [facility]/   Subjective   Subjective  Cc- cough    Cough is better  Very weak  desatting at times  Worse nausea and vomitting today  Partially improved with zofran  Decreased appetite    ROS  No f/c  +n/-v  No cp/palp  No soa/cough  Objective   Vital Signs  Temp:  [98.2 °F (36.8 °C)-99.6 °F (37.6 °C)] 98.2 °F (36.8 °C)  Heart Rate:  [61-68] 68  Resp:  [16-18] 16  BP: (118-163)/(61-69) 118/64  SpO2:  [90 %-99 %] 99 %  on   ;   O2 Device: room air  Body mass index is 24.03 kg/(m^2).    Physical Exam    Alert  Supple, no jvd  Elderly, some muscle atrophy  RRR, no murmurs  Soft, nt  No edema or cyanosis  Lungs clear no resp distress  Pleasant, appropriate    Results Review:       I reviewed the patient's new clinical results.    Results from last 7 days  Lab Units 18  0551 18  0632 18  0446 18  1541 18  1408   WBC 10*3/mm3 2.32* 3.07* 3.28* 3.71* 4.60   HEMOGLOBIN g/dL 10.8* 11.1* 10.8* 11.0* 11.7*   PLATELETS 10*3/mm3 176 177 188 211 219       Results from last 7 days  Lab Units 18  0632 18  0446 18  1541   SODIUM mmol/L 136 131* 128*   POTASSIUM mmol/L 3.8 3.9 4.3   CHLORIDE mmol/L 99 96* 91*   CO2 mmol/L 24.5 23.4 24.6   BUN mg/dL 7* 11 13   CREATININE mg/dL 0.57 0.61 0.77   GLUCOSE mg/dL 90 87 103*   Estimated Creatinine Clearance: 32.1 mL/min (by C-G formula based on Cr of 0.57).    Results from last 7 days  Lab Units 18  0632 18  0446 18  1541   CALCIUM mg/dL 8.1* 8.3 8.8   ALBUMIN g/dL  --   --  3.70     MRI Brain With & Without Contrast [[id number]] Not Reviewed        Order Status: Completed Collected: 18        Updated: 18       Narrative:         MRI BRAIN W WO CONTRAST-     INDICATIONS: Seizures     TECHNIQUE: Multiplanar multisequence magnetic resonance imaging of  the  brain, without and with intravenous contrast material.     COMPARISON: Head CT from 02/16/2018     FINDINGS:     The diffusion-weighted images show no restricted diffusion to suggest  acute infarct. Small encephalomalacia/old infarct change is apparent  laterally adjacent to the posterior horn of the left lateral ventricle.        The midline structures appear unremarkable.     The brain parenchyma shows moderate periventricular white matter T2  FLAIR signal hyperintensity suggesting chronic small vessel ischemic  change in a patient this age. A few scattered small foci of  susceptibility artifact in the bilateral cerebrum, nonspecific, could  for example be evidence of amyloid angiopathy or sequela of old  hypertensive microhemorrhages. No enhancing lesions of brain are  identified. Vascular flow related artifact is present on the  postcontrast images.     Flow voids in the major arteries at the base of the brain appear  unremarkable.     Partial opacification of the sphenoid sinus. The paranasal sinuses,  mastoid air cells, and orbits otherwise appear unremarkable.          Impression:         No acute infarct. No enhancing lesion in brain. Chronic-appearing  changes. Follow-up as indications persist.           This report was finalized on 2/17/2018 9:15 AM by Dr. Levon Grissom MD.          XR Chest 2 View [[id number]] Not Reviewed       Order Status: Completed Collected: 02/16/18 2113        Updated: 02/17/18 1153       Narrative:         EMERGENCY PA AND LATERAL CHEST X-RAY 02/16/2018     CLINICAL HISTORY: Cough, diagnosed with flu today.      COMPARISON: Correlated to prior chest CT 01/26/2018.     FINDINGS: There are clips in the left axilla from a previous left  axillary dissection. There has been a previous left mastectomy. The  cardiomediastinal silhouette and pulmonary vasculature are within normal  limits. There are scattered tiny noncalcified and calcified nodules in  the lungs likely  granulomas. The remainder of the lungs are clear. The  costophrenic angles are sharp.          Impression:            1. No change when compared to chest CT from [facility] on 01/26/2018. No definite active disease is seen in the  chest. There has been a previous left mastectomy and left axillary  dissection.     This report was finalized on 2/17/2018 11:50 AM by Dr. George Balbuena MD.          CT Head Without Contrast [[id number]] Not Reviewed       Order Status: Completed Collected: 02/16/18 1612        Updated: 02/16/18 1622       Narrative:         CT HEAD WITHOUT CONTRAST     CLINICAL HISTORY: Syncope versus seizure.     CT scan of the head was obtained with 3 mm axial images. No intravenous  contrast was administered.     FINDINGS:     The ventricles, sulci, and cisterns are age appropriate. There are  mild-to-moderate changes of chronic small vessel ischemic phenomena.  There is either an enlarged perivascular space or chronic infarct within  the right aspect of the midbrain measuring up to 3 mm in diameter.  Otherwise, the posterior fossa structures are within normal limits.     The extracranial structures are incidentally notable for mucosal  thickening within the sphenoid sinus. There is a mucous retention cyst  within the right aspect of the sphenoid sinus.          Impression:            No evidence for acute intracranial pathology. There is no convincing  evidence to suggest a seizure focus. However, further evaluation could  be performed with MR imaging for much more sensitive and specific  detection of a potential seizure focus if clinically indicated. These  findings and recommendations were directly discussed with Dr. Serrano on  02/16/2018 at approximately 4:18 PM.         XR Abdomen 2 View With Chest 1 View [[id number]] Not Reviewed        Order Status: Completed Collected: 02/19/18 1546        Updated: 02/19/18 1550       Narrative:         CHEST  ABDOMEN 2 VIEWS     HISTORY:  89-year-old female with nausea vomiting and flulike symptoms  presents for evaluation. HISTORY includes left mastectomy for  malignancy, GERD, asthma, diabetes, hypertension, vertigo     COMPARISON: 02/16/2018     FINDINGS:  1. Stable negative acute chest.  2. Mild scoliosis diffuse vascular calcification.  3. Normal bowel gas pattern without obstruction, free air nor  dilatation.          Impression:         1. Negative acute chest, negative acute abdomen            albuterol 2.5 mg Nebulization BID - RT   amLODIPine 5 mg Oral Daily   insulin aspart 0-7 Units Subcutaneous 4x Daily With Meals & Nightly   lisinopril 40 mg Oral Daily   pantoprazole 40 mg Oral QAM   rosuvastatin 5 mg Oral Daily   Scopolamine 1 patch Transdermal Q72H       sodium chloride 75 mL/hr Last Rate: 75 mL/hr (02/19/18 1202)   Diet Regular; Consistent Carbohydrate      Assessment/Plan   Active Hospital Problems (** Indicates Principal Problem)    Diagnosis Date Noted   • **Syncope [R55] 02/16/2018   • DNR (do not resuscitate) [Z66] 02/19/2018   • Nausea & vomiting [R11.2] 02/19/2018   • Neutropenia associated with infection [D70.3] 02/18/2018   • Hypoxia [R09.02] 02/17/2018   • Bradycardia [R00.1] 02/17/2018   • Convulsions [R56.9] 02/17/2018   • Influenza B [J10.1] 02/16/2018   • Hyponatremia [E87.1] 02/16/2018   • DM (diabetes mellitus), type 2 [E11.9] 12/16/2016   • Essential hypertension [I10] 12/16/2016      Resolved Hospital Problems    Diagnosis Date Noted Date Resolved   No resolved problems to display.       Ms. Sams is a 89 y.o. female who has had symptoms of influenza for a few days who has been admitted with syncope with convulsions at doctors office    · Suspect vaso-vagal syncope. She states had nausea before the episode and had been feeling ill due to the flu. Orthostatics reportedly negative.  · Cardiology following for bradycardia/syncope.   · Patient declined tamiflu. Does have some hypoxia - some supplemental O2 during  hospitalization as needed  · Hyponatremia improving.  · Neutropenia likely related to flu, will follow  · N/V today. Check KUB which was ok. Add scopalamine patch, pepcid BID  · DNR/DNI- discussed code status with patient and she now wishes for DNR/DNI which I think is certainly appropriate    Reviewed previous records  D/w staff    Rodolfo Sykes MD  [facility]  02/19/18  5:32 PM

## 2018-02-20 PROBLEM — K59.01 SLOW TRANSIT CONSTIPATION: Status: ACTIVE | Noted: 2018-02-20

## 2018-02-20 LAB
ANION GAP SERPL CALCULATED.3IONS-SCNC: 12 MMOL/L
BASOPHILS # BLD AUTO: 0.01 10*3/MM3 (ref 0–0.2)
BASOPHILS NFR BLD AUTO: 0.4 % (ref 0–1.5)
BUN BLD-MCNC: 7 MG/DL (ref 8–23)
BUN/CREAT SERPL: 13.5 (ref 7–25)
CALCIUM SPEC-SCNC: 8.7 MG/DL (ref 8.2–9.6)
CHLORIDE SERPL-SCNC: 96 MMOL/L (ref 98–107)
CO2 SERPL-SCNC: 26 MMOL/L (ref 22–29)
CREAT BLD-MCNC: 0.52 MG/DL (ref 0.57–1)
DEPRECATED RDW RBC AUTO: 43.6 FL (ref 37–54)
EOSINOPHIL # BLD AUTO: 0.02 10*3/MM3 (ref 0–0.7)
EOSINOPHIL NFR BLD AUTO: 0.8 % (ref 0.3–6.2)
ERYTHROCYTE [DISTWIDTH] IN BLOOD BY AUTOMATED COUNT: 12.7 % (ref 11.7–13)
GFR SERPL CREATININE-BSD FRML MDRD: 111 ML/MIN/1.73
GLUCOSE BLD-MCNC: 94 MG/DL (ref 65–99)
GLUCOSE BLDC GLUCOMTR-MCNC: 107 MG/DL (ref 70–130)
GLUCOSE BLDC GLUCOMTR-MCNC: 111 MG/DL (ref 70–130)
GLUCOSE BLDC GLUCOMTR-MCNC: 114 MG/DL (ref 70–130)
GLUCOSE BLDC GLUCOMTR-MCNC: 120 MG/DL (ref 70–130)
GLUCOSE BLDC GLUCOMTR-MCNC: 90 MG/DL (ref 70–130)
HCT VFR BLD AUTO: 33.9 % (ref 35.6–45.5)
HGB BLD-MCNC: 11.1 G/DL (ref 11.9–15.5)
IMM GRANULOCYTES # BLD: 0 10*3/MM3 (ref 0–0.03)
IMM GRANULOCYTES NFR BLD: 0 % (ref 0–0.5)
LYMPHOCYTES # BLD AUTO: 0.99 10*3/MM3 (ref 0.9–4.8)
LYMPHOCYTES NFR BLD AUTO: 38.8 % (ref 19.6–45.3)
MCH RBC QN AUTO: 30.7 PG (ref 26.9–32)
MCHC RBC AUTO-ENTMCNC: 32.7 G/DL (ref 32.4–36.3)
MCV RBC AUTO: 93.9 FL (ref 80.5–98.2)
MONOCYTES # BLD AUTO: 0.28 10*3/MM3 (ref 0.2–1.2)
MONOCYTES NFR BLD AUTO: 11 % (ref 5–12)
NEUTROPHILS # BLD AUTO: 1.25 10*3/MM3 (ref 1.9–8.1)
NEUTROPHILS NFR BLD AUTO: 49 % (ref 42.7–76)
PLATELET # BLD AUTO: 172 10*3/MM3 (ref 140–500)
PMV BLD AUTO: 10.5 FL (ref 6–12)
POTASSIUM BLD-SCNC: 3.2 MMOL/L (ref 3.5–5.2)
RBC # BLD AUTO: 3.61 10*6/MM3 (ref 3.9–5.2)
SODIUM BLD-SCNC: 134 MMOL/L (ref 136–145)
WBC NRBC COR # BLD: 2.55 10*3/MM3 (ref 4.5–10.7)

## 2018-02-20 PROCEDURE — 85025 COMPLETE CBC W/AUTO DIFF WBC: CPT | Performed by: INTERNAL MEDICINE

## 2018-02-20 PROCEDURE — 82962 GLUCOSE BLOOD TEST: CPT

## 2018-02-20 PROCEDURE — 99231 SBSQ HOSP IP/OBS SF/LOW 25: CPT | Performed by: INTERNAL MEDICINE

## 2018-02-20 PROCEDURE — 94799 UNLISTED PULMONARY SVC/PX: CPT

## 2018-02-20 PROCEDURE — 25010000002 ONDANSETRON PER 1 MG: Performed by: HOSPITALIST

## 2018-02-20 PROCEDURE — 97110 THERAPEUTIC EXERCISES: CPT

## 2018-02-20 PROCEDURE — 80048 BASIC METABOLIC PNL TOTAL CA: CPT | Performed by: INTERNAL MEDICINE

## 2018-02-20 RX ORDER — POTASSIUM CHLORIDE 750 MG/1
40 CAPSULE, EXTENDED RELEASE ORAL ONCE
Status: COMPLETED | OUTPATIENT
Start: 2018-02-20 | End: 2018-02-20

## 2018-02-20 RX ORDER — SENNA AND DOCUSATE SODIUM 50; 8.6 MG/1; MG/1
2 TABLET, FILM COATED ORAL 2 TIMES DAILY
Status: DISCONTINUED | OUTPATIENT
Start: 2018-02-20 | End: 2018-02-23 | Stop reason: HOSPADM

## 2018-02-20 RX ADMIN — AMLODIPINE BESYLATE 5 MG: 5 TABLET ORAL at 09:53

## 2018-02-20 RX ADMIN — ONDANSETRON 4 MG: 2 INJECTION INTRAMUSCULAR; INTRAVENOUS at 16:07

## 2018-02-20 RX ADMIN — PANTOPRAZOLE SODIUM 40 MG: 40 TABLET, DELAYED RELEASE ORAL at 06:25

## 2018-02-20 RX ADMIN — ALBUTEROL SULFATE 2.5 MG: 2.5 SOLUTION RESPIRATORY (INHALATION) at 07:31

## 2018-02-20 RX ADMIN — ROSUVASTATIN CALCIUM 5 MG: 5 TABLET, FILM COATED ORAL at 09:53

## 2018-02-20 RX ADMIN — ALBUTEROL SULFATE 2.5 MG: 2.5 SOLUTION RESPIRATORY (INHALATION) at 19:20

## 2018-02-20 RX ADMIN — DOCUSATE SODIUM -SENNOSIDES 2 TABLET: 50; 8.6 TABLET, COATED ORAL at 18:13

## 2018-02-20 RX ADMIN — LISINOPRIL 20 MG: 20 TABLET ORAL at 09:52

## 2018-02-20 RX ADMIN — FAMOTIDINE 20 MG: 20 TABLET, FILM COATED ORAL at 09:53

## 2018-02-20 RX ADMIN — POTASSIUM CHLORIDE 40 MEQ: 750 CAPSULE, EXTENDED RELEASE ORAL at 10:03

## 2018-02-20 RX ADMIN — FAMOTIDINE 20 MG: 20 TABLET, FILM COATED ORAL at 20:53

## 2018-02-20 NOTE — PROGRESS NOTES
Hospital Follow Up    LOS:  LOS: 4 days   Patient Name: Cristal Sams  Age/Sex: 89 y.o. female  : 1927  MRN: [id number]    Day of Service: 18   Length of Stay: 4  Encounter Provider: Irwin Thorne MD  Place of Service: [facility] CARDIOLOGY  Patient Care Team:  George Rock MD as PCP - General  Pastor Crews MD as Consulting Physician (Gastroenterology)  George Rock MD as Referring Physician (Internal Medicine)  Milly Penaloza MD as Consulting Physician (Hematology and Oncology)    Subjective:     Chief Complaint: Syncope/bradycardia.    Interval History: Patient about the same she says she feels about the same she looked a little bit stronger but says she still felt incredibly weak.    Objective:     Objective:  Temp:  [98.2 °F (36.8 °C)-99 °F (37.2 °C)] 99 °F (37.2 °C)  Heart Rate:  [52-73] 57  Resp:  [16-18] 18  BP: (118-154)/(55-84) 123/55     Intake/Output Summary (Last 24 hours) at 18 0907  Last data filed at 18 0037   Gross per 24 hour   Intake              300 ml   Output             2950 ml   Net            -2650 ml     Body mass index is 24.03 kg/(m^2).  Last 3 weights    18  1509 18  2053 18  1208   Weight: 44 kg (97 lb) 44 kg (96 lb 14.4 oz) 43.5 kg (96 lb)     Weight change:       Physical Exam:   General : Alert,  Neuro: alert,cooperative and oriented  Lungs: Rhonchi  CV:: Regular rate and rhythm, normal S1 and S2, no murmurs, gallops or rubs.  ABD: Soft, nontender, non-distended. positive bowel sounds  Extr: No edema or cyanosis, moves all extremities    Lab Review:     Results from last 7 days  Lab Units 18  0442 18  0632  18  1541   SODIUM mmol/L 134* 136  < > 128*   POTASSIUM mmol/L 3.2* 3.8  < > 4.3   CHLORIDE mmol/L 96* 99  < > 91*   CO2 mmol/L 26.0 24.5  < > 24.6   BUN mg/dL 7* 7*  < > 13   CREATININE mg/dL 0.52* 0.57  < > 0.77   GLUCOSE mg/dL 94 90  < > 103*   CALCIUM mg/dL 8.7  8.1*  < > 8.8   AST (SGOT) U/L  --   --   --  23   ALT (SGPT) U/L  --   --   --  13   < > = values in this interval not displayed.    Results from last 7 days  Lab Units 02/16/18  1541   TROPONIN T ng/mL <0.010       Results from last 7 days  Lab Units 02/20/18  0442 02/19/18  0551   WBC 10*3/mm3 2.55* 2.32*   HEMOGLOBIN g/dL 11.1* 10.8*   HEMATOCRIT % 33.9* 33.9*   PLATELETS 10*3/mm3 172 176                   Invalid input(s): LDLCALC        Results from last 7 days  Lab Units 02/17/18  0446   TSH mIU/mL 1.740     I reviewed the patient's new clinical results.  I personally viewed and interpreted the patient's EKG  Current Medications:   Scheduled Meds:  albuterol 2.5 mg Nebulization BID - RT   amLODIPine 5 mg Oral Daily   famotidine 20 mg Oral BID   insulin aspart 0-7 Units Subcutaneous 4x Daily With Meals & Nightly   lisinopril 20 mg Oral Daily   pantoprazole 40 mg Oral QAM   potassium chloride 40 mEq Oral Once   rosuvastatin 5 mg Oral Daily   Scopolamine 1 patch Transdermal Q72H     Continuous Infusions:  sodium chloride 50 mL/hr Last Rate: 50 mL/hr (02/19/18 1734)       Allergies:  Allergies   Allergen Reactions   • Contrast Dye    • Novocain [Procaine] Palpitations and Parasthesia     LEG GET NUMB   • Aleve [Naproxen Sodium] GI Intolerance   • Cortizone-10 [Hydrocortisone] Other (See Comments)     UNSURE   • Eye Drops [Naphazoline-Polyethyl Glycol] Other (See Comments)     UNSURE   • Lipitor [Atorvastatin] Other (See Comments)     UNSURE   • Sulfur Other (See Comments)     UNSURE   • Valium [Diazepam] Other (See Comments)     Doesn't like the way it makes her feel   • Zocor [Simvastatin] Other (See Comments)     UNSURE   • Zyrtec [Cetirizine] Other (See Comments)     UNSURE       Assessment:     Principal Problem:    Syncope  Active Problems:    Essential hypertension    DM (diabetes mellitus), type 2    Influenza B    Hyponatremia    Hypoxia    Bradycardia    Convulsions    Neutropenia associated with  infection    DNR (do not resuscitate)    Nausea & vomiting        Plan:  1.  Bradycardia heart rate remained stable do not think this is an issue.  2.  Agree with probably vasovagal syncope.  3.  Influenza  4.  Neutropenia  5.  Unfortunately no significant clinical improvement.     6.  I'm not adding anything at this point.  Patient stable from a cardiac standpoint.  We'll sign off please call if questions, issues, concerns arise.      Irwin Thorne MD  02/20/18  9:07 AM

## 2018-02-20 NOTE — THERAPY TREATMENT NOTE
Acute Care - Physical Therapy Treatment Note  [facility]     Patient Name: Cristal Sams  : 1927  MRN: [id number]  Today's Date: 2018  Onset of Illness/Injury or Date of Surgery Date: 18     Referring Physician: Edgar    Admit Date: 2018    Visit Dx:    ICD-10-CM ICD-9-CM   1. Influenza B J10.1 487.1   2. Seizure, potential R56.9 780.39   3. 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, estrogen receptor positive   • Influenza B   • Syncope   • Hyponatremia   • Hypoxia   • Bradycardia   • Convulsions   • Neutropenia associated with infection   • DNR (do not resuscitate)   • Nausea & vomiting               Adult Rehabilitation Note       18 0852          Rehab Assessment/Intervention    Discipline physical therapist  -      Document Type therapy note (daily note)  -      Subjective Information agree to therapy;complains of;pain  -CH      Patient Effort, Rehab Treatment good  -      Symptoms Noted During/After Treatment fatigue  -      Precautions/Limitations fall precautions   infection precaution  -      Recorded by [CH] Ariane Saavedra, PT      Pain Assessment    Pain Assessment 0-10  -      Pain Score 4   pt reports she is sore from coughing  -      Pain Location Rib cage  -      Pain Orientation Left  -      Pain Intervention(s) Repositioned  -      Recorded by [CH] Ariane Saavedra, PT      Cognitive Assessment/Intervention    Current Cognitive/Communication Assessment functional  -      Orientation Status oriented x 4  -      Follows Commands/Answers Questions  100% of the time  -      Personal Safety WNL/WFL  -      Personal Safety Interventions fall prevention program maintained;gait belt;nonskid shoes/slippers when out of bed  -      Recorded by [CH] Ariane Saavedra, PT      Bed Mobility, Assessment/Treatment    Bed Mob, Supine to Sit, [place] supervision required  -      Bed Mob, Sit to Supine, [place] contact guard assist  -      Bed Mobility, Comment required some assistance to get feet back in bed  -      Recorded by [CH] Ariane Saavedra PT      Transfer Assessment/Treatment    Transfers, Sit-Stand [place] verbal cues required;nonverbal cues required (demo/gesture);contact guard assist  -      Transfers, Stand-Sit [place] verbal cues required;nonverbal cues required (demo/gesture);contact guard assist  -      Transfers, Sit-Stand-Sit, Assist Device rolling walker  -      Recorded by [CH] Ariane Saavedra PT      Gait Assessment/Treatment    Gait, [place] Level verbal cues required;nonverbal cues required (demo/gesture);contact guard assist  -      Gait, Assistive Device rolling walker  -      Gait, Distance (Feet) 100  -      Gait, Gait Deviations alton decreased;step length decreased;stride length decreased  -      Gait, Comment gait distance limited secondary to increased L flank pain  -      Recorded by [CH] Ariane Saavedra PT      Positioning and Restraints    Pre-Treatment Position in bed  -      Post Treatment Position bed  -      In Bed supine;call light within reach;encouraged to call for assist;exit alarm on  -      Recorded by [CH] Ariane Saavedra PT        User Key  (r) = Recorded By, (t) = Taken By, (c) = Cosigned By    Initials Name Effective Dates     Ariane Saavedra, PT 12/01/15 -                 IP PT Goals       02/19/18 0901          Bed Mobility PT LTG    Bed Mobility PT LTG, Date Established 02/19/18  -EE      Bed Mobility PT LTG, Time to Achieve 1 wk  -EE      Bed  Mobility PT LTG, Activity Type all bed mobility  -EE      Bed Mobility PT LTG, [place] Level conditional independence  -EE      Transfer Training PT LTG    Transfer Training PT LTG, Date Established 02/19/18  -EE      Transfer Training PT LTG, Time to Achieve 1 wk  -EE      Transfer Training PT LTG, Activity Type all transfers  -EE      Transfer Training PT LTG, [place] Level supervision required  -EE      Transfer Training PT LTG, Assist Device walker, rolling  -EE      Gait Training PT LTG    Gait Training Goal PT LTG, Date Established 02/19/18  -EE      Gait Training Goal PT LTG, Time to Achieve 1 wk  -EE      Gait Training Goal PT LTG, [place] Level supervision required  -EE      Gait Training Goal PT LTG, Assist Device walker, rolling  -EE      Gait Training Goal PT LTG, Distance to Achieve 150  -EE        User Key  (r) = Recorded By, (t) = Taken By, (c) = Cosigned By    Initials Name Provider Type    EE Maria Guadalupe Lezama, CRISTOPHER Physical Therapist          Physical Therapy Education     Title: PT OT SLP Therapies (Active)     Topic: Physical Therapy (Active)     Point: Mobility training (Done)    Learning Progress Summary    Learner Readiness Method Response Comment Documented by Status   Patient Acceptance E,TB,D VU,NR   02/20/18 0856 Done    Acceptance E NR,VU  EE 02/19/18 0901 Done               Point: Body mechanics (Done)    Learning Progress Summary    Learner Readiness Method Response Comment Documented by Status   Patient Acceptance E,TB,D VU,NR   02/20/18 0856 Done    Acceptance E NR,VU  EE 02/19/18 0901 Done               Point: Precautions (Done)    Learning Progress Summary    Learner Readiness Method Response Comment Documented by Status   Patient Acceptance E,TB,D VU,NR   02/20/18 0856 Done                      User Key     Initials Effective Dates Name Provider Type Discipline     12/01/15 -  Ariane Saavedra, PT Physical Therapist PT     12/01/15 -  Maria Guadalupe Lezama PT Physical  Therapist PT                    PT Recommendation and Plan  Anticipated Equipment Needs At Discharge: front wheeled walker (rec use of walker for improved balance)  Anticipated Discharge Disposition: home with assist, home with home health  Planned Therapy Interventions: balance training, bed mobility training, gait training, home exercise program, patient/family education, strengthening, transfer training  PT Frequency: daily  Plan of Care Review  Plan Of Care Reviewed With: patient  Outcome Summary/Follow up Plan: Pt with decresaed gait distance today secondary to increased flank pain and fatigue. Pt states she just feels like she has not woken up yet. Pt also with complaints of nausea. PT will continue to follow to address strength, mobiilty, and gait.          Outcome Measures       02/20/18 0800 02/19/18 0900       How much help from another person do you currently need...    Turning from your back to your side while in flat bed without using bedrails? 4  -CH 4  -EE     Moving from lying on back to sitting on the side of a flat bed without bedrails? 3  -CH 3  -EE     Moving to and from a bed to a chair (including a wheelchair)? 3  -CH 3  -EE     Standing up from a chair using your arms (e.g., wheelchair, bedside chair)? 3  -CH 3  -EE     Climbing 3-5 steps with a railing? 2  -CH 2  -EE     To walk in hospital room? 3  -CH 3  -EE     AM-PAC 6 Clicks Score 18  -CH 18  -EE     Functional Assessment    Outcome Measure Options AM-PAC 6 Clicks Basic Mobility (PT)  -CH AM-PAC 6 Clicks Basic Mobility (PT)  -EE       User Key  (r) = Recorded By, (t) = Taken By, (c) = Cosigned By    Initials Name Provider Type    ALEYDA Saavedra, PT Physical Therapist    EE Maria Guadalupe Lezama, PT Physical Therapist           Time Calculation:         PT Charges       02/20/18 0859          Time Calculation    Start Time 0830  -      Stop Time 0850  -      Time Calculation (min) 20 min  -      PT Received On 02/20/18  -      PT -  Next Appointment 02/21/18  -        User Key  (r) = Recorded By, (t) = Taken By, (c) = Cosigned By    Initials Name Provider Type     Ariane Saavedra PT Physical Therapist          Therapy Charges for Today     Code Description Service Date Service Provider Modifiers Qty    73248700487 HC PT THER PROC EA 15 MIN 2/20/2018 Ariane Saavedra, PT GP 1          PT G-Codes  Outcome Measure Options: AM-PAC 6 Clicks Basic Mobility (PT)    Ariane Saavedra PT  2/20/2018

## 2018-02-20 NOTE — PROGRESS NOTES
Name: Cristal Sams ADMIT: 2018   : 1927  PCP: George Rock MD    MRN: [id number] LOS: 4 days   AGE/SEX: 89 y.o. female  ROOM: [facility]/   Subjective   Subjective  Cc- cough    Cough is better  Still some weakness    vomitting is resolved but still with nausea and decreased appetite  Improved with meds    ROS  No f/c  +n/-v  No cp/palp  No soa/cough  Objective   Vital Signs  Temp:  [97.7 °F (36.5 °C)-99 °F (37.2 °C)] 98 °F (36.7 °C)  Heart Rate:  [52-73] 55  Resp:  [18] 18  BP: (113-154)/(55-84) 113/59  SpO2:  [91 %-97 %] 97 %  on   ;   O2 Device: room air  Body mass index is 24.03 kg/(m^2).    Physical Exam    Alert  Chronically ill  Supple, no jvd  Elderly, some muscle atrophy  RRR, no murmurs  Soft, nt  No edema or cyanosis  Lungs clear no resp distress  Pleasant, appropriate    Results Review:       I reviewed the patient's new clinical results.    Results from last 7 days  Lab Units 18  0442 18  0551 18  0632 18  0446 18  1541 18  1408   WBC 10*3/mm3 2.55* 2.32* 3.07* 3.28* 3.71* 4.60   HEMOGLOBIN g/dL 11.1* 10.8* 11.1* 10.8* 11.0* 11.7*   PLATELETS 10*3/mm3 172 176 177 188 211 219       Results from last 7 days  Lab Units 18  0442 18  0632 18  0446 18  1541   SODIUM mmol/L 134* 136 131* 128*   POTASSIUM mmol/L 3.2* 3.8 3.9 4.3   CHLORIDE mmol/L 96* 99 96* 91*   CO2 mmol/L 26.0 24.5 23.4 24.6   BUN mg/dL 7* 7* 11 13   CREATININE mg/dL 0.52* 0.57 0.61 0.77   GLUCOSE mg/dL 94 90 87 103*   Estimated Creatinine Clearance: 32.1 mL/min (by C-G formula based on Cr of 0.52).    Results from last 7 days  Lab Units 18  0442 18  0632 18  0446 18  1541   CALCIUM mg/dL 8.7 8.1* 8.3 8.8   ALBUMIN g/dL  --   --   --  3.70     MRI Brain With & Without Contrast [[id number]] Not Reviewed        Order Status: Completed Collected: 18        Updated: 18       Narrative:         MRI BRAIN W WO  CONTRAST-     INDICATIONS: Seizures     TECHNIQUE: Multiplanar multisequence magnetic resonance imaging of the  brain, without and with intravenous contrast material.     COMPARISON: Head CT from 02/16/2018     FINDINGS:     The diffusion-weighted images show no restricted diffusion to suggest  acute infarct. Small encephalomalacia/old infarct change is apparent  laterally adjacent to the posterior horn of the left lateral ventricle.        The midline structures appear unremarkable.     The brain parenchyma shows moderate periventricular white matter T2  FLAIR signal hyperintensity suggesting chronic small vessel ischemic  change in a patient this age. A few scattered small foci of  susceptibility artifact in the bilateral cerebrum, nonspecific, could  for example be evidence of amyloid angiopathy or sequela of old  hypertensive microhemorrhages. No enhancing lesions of brain are  identified. Vascular flow related artifact is present on the  postcontrast images.     Flow voids in the major arteries at the base of the brain appear  unremarkable.     Partial opacification of the sphenoid sinus. The paranasal sinuses,  mastoid air cells, and orbits otherwise appear unremarkable.          Impression:         No acute infarct. No enhancing lesion in brain. Chronic-appearing  changes. Follow-up as indications persist.           This report was finalized on 2/17/2018 9:15 AM by Dr. Levon Grissom MD.          XR Chest 2 View [[id number]] Not Reviewed       Order Status: Completed Collected: 02/16/18 2113        Updated: 02/17/18 1153       Narrative:         EMERGENCY PA AND LATERAL CHEST X-RAY 02/16/2018     CLINICAL HISTORY: Cough, diagnosed with flu today.      COMPARISON: Correlated to prior chest CT 01/26/2018.     FINDINGS: There are clips in the left axilla from a previous left  axillary dissection. There has been a previous left mastectomy. The  cardiomediastinal silhouette and pulmonary vasculature are within  normal  limits. There are scattered tiny noncalcified and calcified nodules in  the lungs likely granulomas. The remainder of the lungs are clear. The  costophrenic angles are sharp.          Impression:            1. No change when compared to chest CT from [facility] on 01/26/2018. No definite active disease is seen in the  chest. There has been a previous left mastectomy and left axillary  dissection.     This report was finalized on 2/17/2018 11:50 AM by Dr. George Balbuena MD.          CT Head Without Contrast [[id number]] Not Reviewed       Order Status: Completed Collected: 02/16/18 1612        Updated: 02/16/18 1622       Narrative:         CT HEAD WITHOUT CONTRAST     CLINICAL HISTORY: Syncope versus seizure.     CT scan of the head was obtained with 3 mm axial images. No intravenous  contrast was administered.     FINDINGS:     The ventricles, sulci, and cisterns are age appropriate. There are  mild-to-moderate changes of chronic small vessel ischemic phenomena.  There is either an enlarged perivascular space or chronic infarct within  the right aspect of the midbrain measuring up to 3 mm in diameter.  Otherwise, the posterior fossa structures are within normal limits.     The extracranial structures are incidentally notable for mucosal  thickening within the sphenoid sinus. There is a mucous retention cyst  within the right aspect of the sphenoid sinus.          Impression:            No evidence for acute intracranial pathology. There is no convincing  evidence to suggest a seizure focus. However, further evaluation could  be performed with MR imaging for much more sensitive and specific  detection of a potential seizure focus if clinically indicated. These  findings and recommendations were directly discussed with Dr. Serrano on  02/16/2018 at approximately 4:18 PM.         XR Abdomen 2 View With Chest 1 View [[id number]] Not Reviewed        Order Status: Completed Collected: 02/19/18 1546         Updated: 02/19/18 0930       Narrative:         CHEST  ABDOMEN 2 VIEWS     HISTORY: 89-year-old female with nausea vomiting and flulike symptoms  presents for evaluation. HISTORY includes left mastectomy for  malignancy, GERD, asthma, diabetes, hypertension, vertigo     COMPARISON: 02/16/2018     FINDINGS:  1. Stable negative acute chest.  2. Mild scoliosis diffuse vascular calcification.  3. Normal bowel gas pattern without obstruction, free air nor  dilatation.          Impression:         1. Negative acute chest, negative acute abdomen            albuterol 2.5 mg Nebulization BID - RT   amLODIPine 5 mg Oral Daily   famotidine 20 mg Oral BID   insulin aspart 0-7 Units Subcutaneous 4x Daily With Meals & Nightly   lisinopril 20 mg Oral Daily   pantoprazole 40 mg Oral QAM   rosuvastatin 5 mg Oral Daily   Scopolamine 1 patch Transdermal Q72H   sennosides-docusate sodium 2 tablet Oral BID       sodium chloride 50 mL/hr Last Rate: 50 mL/hr (02/19/18 1284)   Diet Regular; Consistent Carbohydrate      Assessment/Plan   Active Hospital Problems (** Indicates Principal Problem)    Diagnosis Date Noted   • **Syncope [R55] 02/16/2018   • Slow transit constipation [K59.01] 02/20/2018   • DNR (do not resuscitate) [Z66] 02/19/2018   • Nausea & vomiting [R11.2] 02/19/2018   • Neutropenia associated with infection [D70.3] 02/18/2018   • Hypoxia [R09.02] 02/17/2018   • Bradycardia [R00.1] 02/17/2018   • Convulsions [R56.9] 02/17/2018   • Influenza B [J10.1] 02/16/2018   • Hyponatremia [E87.1] 02/16/2018   • DM (diabetes mellitus), type 2 [E11.9] 12/16/2016   • Essential hypertension [I10] 12/16/2016      Resolved Hospital Problems    Diagnosis Date Noted Date Resolved   No resolved problems to display.       Ms. Sams is a 89 y.o. female who has had symptoms of influenza for a few days who has been admitted with syncope with convulsions at doctors office    · Suspect vaso-vagal syncope. She states had nausea before the  episode and had been feeling ill due to the flu. Orthostatics reportedly negative.  · Cardiology following for bradycardia/syncope.   · Patient declined tamiflu. Does have some hypoxia - some supplemental O2 during hospitalization as needed. Check nocturnal O2 tonight to see if needs home O2  · Hyponatremia improved. Replace K  · Neutropenia likely related to flu, will follow  · N/V . KUB was normal. scopalamine patch, pepcid BID. Still with some nausea- PRN zofran  · DNR/DNI  · Add agents for constipation    Reviewed previous records    Had planned on discharge today but still weak and with nausea (and 89 y/o). Will hold one day but likely discharge to home 2/21.    Rodolfo Sykes MD  [facility]  02/20/18  3:52 PM

## 2018-02-20 NOTE — PLAN OF CARE
Problem: Patient Care Overview (Adult)  Goal: Plan of Care Review  Outcome: Ongoing (interventions implemented as appropriate)   02/20/18 0857   Coping/Psychosocial Response Interventions   Plan Of Care Reviewed With patient   Outcome Evaluation   Outcome Summary/Follow up Plan Pt with decresaed gait distance today secondary to increased flank pain and fatigue. Pt states she just feels like she has not woken up yet. Pt also with complaints of nausea. PT will continue to follow to address strength, mobiilty, and gait.

## 2018-02-20 NOTE — PROGRESS NOTES
Discharge Planning Assessment  [facility]     Patient Name: Cristal Sams  MRN: [id number]  Today's Date: 2/20/2018    Admit Date: 2/16/2018          Discharge Needs Assessment     None            Discharge Plan       02/20/18 1235    Case Management/Social Work Plan    Plan Patient wants to go home on discharge. She states family will transport. She is not interested in HH    Additional Comments IMM 2-20. Face sheet, pharmacy and PCP verified . Patient lives with her older sister, Eulalia Soni  (age 97 ) in a house and wants to go home on discharge. She has a walker, but doesn't use it . No other DME. She is not interested in HH or rehab. She states her niece, mine helps her . Her nephew  Alexander is her POA . She anticipates no needs.........   MATT Zuniga        Discharge Placement     No information found                Demographic Summary       02/20/18 1221    Referral Information    Admission Type inpatient   IMM 2-20    Arrived From home or self-care    Referral Source admission list    Reason For Consult discharge planning    Primary Care Physician Information    Name George Rock MD            Functional Status       02/20/18 1223    Functional Status Current    Ambulation 3-->assistive equipment and person    Transferring 0-->independent    Toileting 0-->independent    Bathing 0-->independent    Dressing 2-->assistive person    Eating 0-->independent    Communication 0-->understands/communicates without difficulty    Swallowing (if score 2 or more for any item, consult Rehab Services) 0-->swallows foods/liquids without difficulty    Functional Status Prior    Ambulation 0-->independent    Transferring 0-->independent    Toileting 0-->independent    Bathing 0-->independent    Dressing 0-->independent    Eating 0-->independent    Communication 0-->understands/communicates without difficulty    Swallowing 0-->swallows foods/liquids without difficulty    IADL    Medications independent    Meal  Preparation independent    Housekeeping independent    Laundry independent    Shopping independent    Oral Care independent            Psychosocial     None            Abuse/Neglect     None            Legal       02/20/18 1224    Legal    Legal Comments Patient states her nephew, Alexander Soni [phone] is her POA            Substance Abuse     None            Patient Forms     None          MATT Zuniga

## 2018-02-20 NOTE — PLAN OF CARE
Problem: Patient Care Overview (Adult)  Goal: Plan of Care Review  Outcome: Ongoing (interventions implemented as appropriate)   02/20/18 2035   Coping/Psychosocial Response Interventions   Plan Of Care Reviewed With patient   Patient Care Overview   Progress improving   Outcome Evaluation   Outcome Summary/Follow up Plan pt is alert and oriented x4. denies pain. complains of nausea. 1 dose of zofran given. pt plan for discharge tomorrow. will continue to monitor      Goal: Adult Individualization and Mutuality  Outcome: Ongoing (interventions implemented as appropriate)    Goal: Discharge Needs Assessment  Outcome: Ongoing (interventions implemented as appropriate)      Problem: Fall Risk (Adult)  Goal: Absence of Falls  Outcome: Ongoing (interventions implemented as appropriate)      Problem: Respiratory Insufficiency (Adult)  Goal: Acid/Base Balance  Outcome: Ongoing (interventions implemented as appropriate)    Goal: Effective Ventilation  Outcome: Ongoing (interventions implemented as appropriate)

## 2018-02-20 NOTE — PLAN OF CARE
Problem: Patient Care Overview (Adult)  Goal: Plan of Care Review  Outcome: Ongoing (interventions implemented as appropriate)   02/20/18 0856   Coping/Psychosocial Response Interventions   Plan Of Care Reviewed With patient   Patient Care Overview   Progress improving   Outcome Evaluation   Outcome Summary/Follow up Plan Pt A&Ox4, up with assist x1, no complaints of nausea nor pain, iv fluids continuous, room air, con carb diet, vitals WNL, states she doesnt feel much improvement in strength     Goal: Adult Individualization and Mutuality  Outcome: Ongoing (interventions implemented as appropriate)      Problem: Fall Risk (Adult)  Goal: Absence of Falls  Outcome: Ongoing (interventions implemented as appropriate)      Problem: Respiratory Insufficiency (Adult)  Goal: Acid/Base Balance  Outcome: Ongoing (interventions implemented as appropriate)    Goal: Effective Ventilation  Outcome: Ongoing (interventions implemented as appropriate)

## 2018-02-21 LAB
ALBUMIN SERPL-MCNC: 3.3 G/DL (ref 3.5–5.2)
ALBUMIN/GLOB SERPL: 1.3 G/DL
ALP SERPL-CCNC: 58 U/L (ref 39–117)
ALT SERPL W P-5'-P-CCNC: 14 U/L (ref 1–33)
ANION GAP SERPL CALCULATED.3IONS-SCNC: 12.1 MMOL/L
AST SERPL-CCNC: 19 U/L (ref 1–32)
BASOPHILS # BLD AUTO: 0.01 10*3/MM3 (ref 0–0.2)
BASOPHILS NFR BLD AUTO: 0.2 % (ref 0–1.5)
BILIRUB SERPL-MCNC: 0.3 MG/DL (ref 0.1–1.2)
BUN BLD-MCNC: 11 MG/DL (ref 8–23)
BUN/CREAT SERPL: 22 (ref 7–25)
CALCIUM SPEC-SCNC: 8.6 MG/DL (ref 8.2–9.6)
CHLORIDE SERPL-SCNC: 96 MMOL/L (ref 98–107)
CO2 SERPL-SCNC: 21.9 MMOL/L (ref 22–29)
CREAT BLD-MCNC: 0.5 MG/DL (ref 0.57–1)
DEPRECATED RDW RBC AUTO: 45.3 FL (ref 37–54)
EOSINOPHIL # BLD AUTO: 0.02 10*3/MM3 (ref 0–0.7)
EOSINOPHIL NFR BLD AUTO: 0.3 % (ref 0.3–6.2)
ERYTHROCYTE [DISTWIDTH] IN BLOOD BY AUTOMATED COUNT: 12.8 % (ref 11.7–13)
GFR SERPL CREATININE-BSD FRML MDRD: 116 ML/MIN/1.73
GLOBULIN UR ELPH-MCNC: 2.5 GM/DL
GLUCOSE BLD-MCNC: 112 MG/DL (ref 65–99)
GLUCOSE BLDC GLUCOMTR-MCNC: 102 MG/DL (ref 70–130)
GLUCOSE BLDC GLUCOMTR-MCNC: 107 MG/DL (ref 70–130)
GLUCOSE BLDC GLUCOMTR-MCNC: 92 MG/DL (ref 70–130)
GLUCOSE BLDC GLUCOMTR-MCNC: 93 MG/DL (ref 70–130)
HCT VFR BLD AUTO: 38.4 % (ref 35.6–45.5)
HGB BLD-MCNC: 12.1 G/DL (ref 11.9–15.5)
IMM GRANULOCYTES # BLD: 0 10*3/MM3 (ref 0–0.03)
IMM GRANULOCYTES NFR BLD: 0 % (ref 0–0.5)
LYMPHOCYTES # BLD AUTO: 0.57 10*3/MM3 (ref 0.9–4.8)
LYMPHOCYTES NFR BLD AUTO: 9.8 % (ref 19.6–45.3)
MAGNESIUM SERPL-MCNC: 2 MG/DL (ref 1.6–2.4)
MCH RBC QN AUTO: 30.3 PG (ref 26.9–32)
MCHC RBC AUTO-ENTMCNC: 31.5 G/DL (ref 32.4–36.3)
MCV RBC AUTO: 96.2 FL (ref 80.5–98.2)
MONOCYTES # BLD AUTO: 0.3 10*3/MM3 (ref 0.2–1.2)
MONOCYTES NFR BLD AUTO: 5.1 % (ref 5–12)
NEUTROPHILS # BLD AUTO: 4.94 10*3/MM3 (ref 1.9–8.1)
NEUTROPHILS NFR BLD AUTO: 84.6 % (ref 42.7–76)
PLATELET # BLD AUTO: 173 10*3/MM3 (ref 140–500)
PMV BLD AUTO: 10.7 FL (ref 6–12)
POTASSIUM BLD-SCNC: 4 MMOL/L (ref 3.5–5.2)
PROT SERPL-MCNC: 5.8 G/DL (ref 6–8.5)
RBC # BLD AUTO: 3.99 10*6/MM3 (ref 3.9–5.2)
SODIUM BLD-SCNC: 130 MMOL/L (ref 136–145)
WBC NRBC COR # BLD: 5.84 10*3/MM3 (ref 4.5–10.7)

## 2018-02-21 PROCEDURE — 25010000002 ONDANSETRON PER 1 MG: Performed by: HOSPITALIST

## 2018-02-21 PROCEDURE — 94799 UNLISTED PULMONARY SVC/PX: CPT

## 2018-02-21 PROCEDURE — 80053 COMPREHEN METABOLIC PANEL: CPT | Performed by: INTERNAL MEDICINE

## 2018-02-21 PROCEDURE — 83735 ASSAY OF MAGNESIUM: CPT | Performed by: INTERNAL MEDICINE

## 2018-02-21 PROCEDURE — 97110 THERAPEUTIC EXERCISES: CPT

## 2018-02-21 PROCEDURE — 82962 GLUCOSE BLOOD TEST: CPT

## 2018-02-21 PROCEDURE — 85025 COMPLETE CBC W/AUTO DIFF WBC: CPT | Performed by: INTERNAL MEDICINE

## 2018-02-21 RX ORDER — FAMOTIDINE 20 MG/1
20 TABLET, FILM COATED ORAL DAILY
Status: DISCONTINUED | OUTPATIENT
Start: 2018-02-22 | End: 2018-02-22

## 2018-02-21 RX ADMIN — ROSUVASTATIN CALCIUM 5 MG: 5 TABLET, FILM COATED ORAL at 10:26

## 2018-02-21 RX ADMIN — ALBUTEROL SULFATE 2.5 MG: 2.5 SOLUTION RESPIRATORY (INHALATION) at 05:19

## 2018-02-21 RX ADMIN — FAMOTIDINE 20 MG: 20 TABLET, FILM COATED ORAL at 10:25

## 2018-02-21 RX ADMIN — ONDANSETRON 4 MG: 2 INJECTION INTRAMUSCULAR; INTRAVENOUS at 09:58

## 2018-02-21 RX ADMIN — AMLODIPINE BESYLATE 5 MG: 5 TABLET ORAL at 10:26

## 2018-02-21 RX ADMIN — LISINOPRIL 20 MG: 20 TABLET ORAL at 10:26

## 2018-02-21 RX ADMIN — ALBUTEROL SULFATE 2.5 MG: 2.5 SOLUTION RESPIRATORY (INHALATION) at 20:34

## 2018-02-21 NOTE — PLAN OF CARE
Problem: Patient Care Overview (Adult)  Goal: Plan of Care Review  Outcome: Ongoing (interventions implemented as appropriate)   02/21/18 1630   Coping/Psychosocial Response Interventions   Plan Of Care Reviewed With patient   Patient Care Overview   Progress improving   Outcome Evaluation   Outcome Summary/Follow up Plan pt complaining of nausea. zofran given one time. pt stated mild relief. refusing to take second dose of zofran. denies pain. up with assist x1. orthostatics negative. alert and oriented. will continue to monitor. will continue to monitor. no signs or sytmpoms of distress.      Goal: Adult Individualization and Mutuality  Outcome: Ongoing (interventions implemented as appropriate)    Goal: Discharge Needs Assessment  Outcome: Ongoing (interventions implemented as appropriate)      Problem: Fall Risk (Adult)  Goal: Absence of Falls  Outcome: Ongoing (interventions implemented as appropriate)      Problem: Respiratory Insufficiency (Adult)  Goal: Acid/Base Balance  Outcome: Ongoing (interventions implemented as appropriate)    Goal: Effective Ventilation  Outcome: Ongoing (interventions implemented as appropriate)

## 2018-02-21 NOTE — PROGRESS NOTES
Name: Cristal Sams ADMIT: 2018   : 1927  PCP: George Rock MD    MRN: [id number] LOS: 5 days   AGE/SEX: 89 y.o. female  ROOM: [facility]/1   Subjective   Subjective  Cc- cough    6 diarrhea, green/water per pt, no mucus or blood, not usual for pt  Pre-syncope last night, orthostatics negative  Cough is better  Still some weakness continues    vomitting is resolved but still with nausea and decreased appetite  Improved with meds    ROS  No f/c  +n/-v  No cp/palp  No soa/cough  Objective   Vital Signs  Temp:  [96.9 °F (36.1 °C)-98.3 °F (36.8 °C)] 98.3 °F (36.8 °C)  Heart Rate:  [49-77] 77  Resp:  [16-20] 20  BP: (113-146)/(54-75) 146/72  SpO2:  [92 %-99 %] 95 %  on  Flow (L/min):  [2] 2;   O2 Device: room air  Body mass index is 24.03 kg/(m^2).    Physical Exam    Alert, sitting in chair  Chronically ill  Supple, no jvd  Elderly, some muscle atrophy  RRR, no murmurs  Soft, nt  No edema or cyanosis  Lungs clear no resp distress  Pleasant, appropriate    Results Review:       I reviewed the patient's new clinical results.    Results from last 7 days  Lab Units 18  0450 18  0442 18  0551 18  0632 18  0446 18  1541 18  1408   WBC 10*3/mm3 5.84 2.55* 2.32* 3.07* 3.28* 3.71* 4.60   HEMOGLOBIN g/dL 12.1 11.1* 10.8* 11.1* 10.8* 11.0* 11.7*   PLATELETS 10*3/mm3 173 172 176 177 188 211 219       Results from last 7 days  Lab Units 18  0450 18  0442 18  0632 18  0446 18  1541   SODIUM mmol/L 130* 134* 136 131* 128*   POTASSIUM mmol/L 4.0 3.2* 3.8 3.9 4.3   CHLORIDE mmol/L 96* 96* 99 96* 91*   CO2 mmol/L 21.9* 26.0 24.5 23.4 24.6   BUN mg/dL 11 7* 7* 11 13   CREATININE mg/dL 0.50* 0.52* 0.57 0.61 0.77   GLUCOSE mg/dL 112* 94 90 87 103*   Estimated Creatinine Clearance: 32.1 mL/min (by C-G formula based on Cr of 0.5).    Results from last 7 days  Lab Units 18  0450 18  0442 18  0632 18  0446 18  1541    CALCIUM mg/dL 8.6 8.7 8.1* 8.3 8.8   ALBUMIN g/dL 3.30*  --   --   --  3.70   MAGNESIUM mg/dL 2.0  --   --   --   --      MRI Brain With & Without Contrast [[id number]] Not Reviewed        Order Status: Completed Collected: 02/17/18 0900        Updated: 02/17/18 0919       Narrative:         MRI BRAIN W WO CONTRAST-     INDICATIONS: Seizures     TECHNIQUE: Multiplanar multisequence magnetic resonance imaging of the  brain, without and with intravenous contrast material.     COMPARISON: Head CT from 02/16/2018     FINDINGS:     The diffusion-weighted images show no restricted diffusion to suggest  acute infarct. Small encephalomalacia/old infarct change is apparent  laterally adjacent to the posterior horn of the left lateral ventricle.        The midline structures appear unremarkable.     The brain parenchyma shows moderate periventricular white matter T2  FLAIR signal hyperintensity suggesting chronic small vessel ischemic  change in a patient this age. A few scattered small foci of  susceptibility artifact in the bilateral cerebrum, nonspecific, could  for example be evidence of amyloid angiopathy or sequela of old  hypertensive microhemorrhages. No enhancing lesions of brain are  identified. Vascular flow related artifact is present on the  postcontrast images.     Flow voids in the major arteries at the base of the brain appear  unremarkable.     Partial opacification of the sphenoid sinus. The paranasal sinuses,  mastoid air cells, and orbits otherwise appear unremarkable.          Impression:         No acute infarct. No enhancing lesion in brain. Chronic-appearing  changes. Follow-up as indications persist.           This report was finalized on 2/17/2018 9:15 AM by Dr. Levon Grissom MD.          XR Chest 2 View [[id number]] Not Reviewed       Order Status: Completed Collected: 02/16/18 2113        Updated: 02/17/18 1153       Narrative:         EMERGENCY PA AND LATERAL CHEST X-RAY  02/16/2018     CLINICAL HISTORY: Cough, diagnosed with flu today.      COMPARISON: Correlated to prior chest CT 01/26/2018.     FINDINGS: There are clips in the left axilla from a previous left  axillary dissection. There has been a previous left mastectomy. The  cardiomediastinal silhouette and pulmonary vasculature are within normal  limits. There are scattered tiny noncalcified and calcified nodules in  the lungs likely granulomas. The remainder of the lungs are clear. The  costophrenic angles are sharp.          Impression:            1. No change when compared to chest CT from [facility] on 01/26/2018. No definite active disease is seen in the  chest. There has been a previous left mastectomy and left axillary  dissection.     This report was finalized on 2/17/2018 11:50 AM by Dr. George Balbuena MD.          CT Head Without Contrast [[id number]] Not Reviewed       Order Status: Completed Collected: 02/16/18 1612        Updated: 02/16/18 1622       Narrative:         CT HEAD WITHOUT CONTRAST     CLINICAL HISTORY: Syncope versus seizure.     CT scan of the head was obtained with 3 mm axial images. No intravenous  contrast was administered.     FINDINGS:     The ventricles, sulci, and cisterns are age appropriate. There are  mild-to-moderate changes of chronic small vessel ischemic phenomena.  There is either an enlarged perivascular space or chronic infarct within  the right aspect of the midbrain measuring up to 3 mm in diameter.  Otherwise, the posterior fossa structures are within normal limits.     The extracranial structures are incidentally notable for mucosal  thickening within the sphenoid sinus. There is a mucous retention cyst  within the right aspect of the sphenoid sinus.          Impression:            No evidence for acute intracranial pathology. There is no convincing  evidence to suggest a seizure focus. However, further evaluation could  be performed with MR imaging for much more  sensitive and specific  detection of a potential seizure focus if clinically indicated. These  findings and recommendations were directly discussed with Dr. Serrano on  02/16/2018 at approximately 4:18 PM.         XR Abdomen 2 View With Chest 1 View [[id number]] Not Reviewed        Order Status: Completed Collected: 02/19/18 1546        Updated: 02/19/18 1550       Narrative:         CHEST  ABDOMEN 2 VIEWS     HISTORY: 89-year-old female with nausea vomiting and flulike symptoms  presents for evaluation. HISTORY includes left mastectomy for  malignancy, GERD, asthma, diabetes, hypertension, vertigo     COMPARISON: 02/16/2018     FINDINGS:  1. Stable negative acute chest.  2. Mild scoliosis diffuse vascular calcification.  3. Normal bowel gas pattern without obstruction, free air nor  dilatation.          Impression:         1. Negative acute chest, negative acute abdomen            albuterol 2.5 mg Nebulization BID - RT   amLODIPine 5 mg Oral Daily   [START ON 2/22/2018] famotidine 20 mg Oral Daily   insulin aspart 0-7 Units Subcutaneous 4x Daily With Meals & Nightly   lisinopril 20 mg Oral Daily   pantoprazole 40 mg Oral QAM   rosuvastatin 5 mg Oral Daily   Scopolamine 1 patch Transdermal Q72H   sennosides-docusate sodium 2 tablet Oral BID       sodium chloride 50 mL/hr Last Rate: 50 mL/hr (02/19/18 1734)   Diet Regular; Consistent Carbohydrate      Assessment/Plan   Active Hospital Problems (** Indicates Principal Problem)    Diagnosis Date Noted   • **Syncope [R55] 02/16/2018   • Slow transit constipation [K59.01] 02/20/2018   • DNR (do not resuscitate) [Z66] 02/19/2018   • Nausea & vomiting [R11.2] 02/19/2018   • Neutropenia associated with infection [D70.3] 02/18/2018   • Hypoxia [R09.02] 02/17/2018   • Bradycardia [R00.1] 02/17/2018   • Convulsions [R56.9] 02/17/2018   • Influenza B [J10.1] 02/16/2018   • Hyponatremia [E87.1] 02/16/2018   • DM (diabetes mellitus), type 2 [E11.9] 12/16/2016   • Essential  hypertension [I10] 12/16/2016      Resolved Hospital Problems    Diagnosis Date Noted Date Resolved   No resolved problems to display.       Ms. Sams is a 89 y.o. female who has had symptoms of influenza for a few days who has been admitted with syncope with convulsions at doctors office    · Diarrhea: suspect viral cause but will check C. Diff.  Recheck labs in am  · Presyncope last night related to diarrhea, orthostatics negative  · Suspect vaso-vagal syncope. She states had nausea before the episode and had been feeling ill due to the flu.    · Cardiology following for bradycardia/syncope.   · Patient declined tamiflu. Does have some hypoxia - some supplemental O2 during hospitalization as needed but none now. Ordered nocturnal O2 last night to see if needs home O2 but no results yet  · Hyponatremia improved. Replace K   · Neutropenia likely related to flu, will follow  · N/V . KUB was normal. scopalamine patch, pepcid BID. Still with some nausea- PRN zofran  · DNR/DNI  · Add agents for constipation    Reviewed previous records    Had planned on discharge today but  Having diarrhea now, still weak and with nausea (and 89 y/o). Will hold one day but likely discharge to home 2/22.    Mathieu Nesbitt MD  [facility]  02/21/18  12:47 PM

## 2018-02-21 NOTE — PLAN OF CARE
Problem: Patient Care Overview (Adult)  Goal: Plan of Care Review  Outcome: Ongoing (interventions implemented as appropriate)   02/21/18 0403   Coping/Psychosocial Response Interventions   Plan Of Care Reviewed With patient   Patient Care Overview   Progress no change   Outcome Evaluation   Outcome Summary/Follow up Plan Patient had an episode of fainting, VSS, kept on O2 at 2L for comfort, offered zofran but refused, did had a BM, no vomiting, will continue to monitor     Goal: Adult Individualization and Mutuality  Outcome: Ongoing (interventions implemented as appropriate)   02/21/18 0403   Individualization   Patient Specific Preferences not to faint    Patient Specific Goals monitor for any syncopal episode, get stronger, monitor for nausea   Patient Specific Interventions educated to stay in bed for now due to possible fainting, kept on bed alarm       Problem: Fall Risk (Adult)  Goal: Absence of Falls  Outcome: Ongoing (interventions implemented as appropriate)   02/21/18 0403   Fall Risk (Adult)   Absence of Falls making progress toward outcome       Problem: Respiratory Insufficiency (Adult)  Goal: Acid/Base Balance  Outcome: Ongoing (interventions implemented as appropriate)   02/21/18 0403   Respiratory Insufficiency (Adult)   Acid/Base Balance making progress toward outcome     Goal: Effective Ventilation  Outcome: Ongoing (interventions implemented as appropriate)   02/21/18 0403   Respiratory Insufficiency (Adult)   Effective Ventilation making progress toward outcome

## 2018-02-21 NOTE — NURSING NOTE
Patient about togo to bathroom with with assist when verbalized she feels like she will faint.  BP and taken and recorded, patient laid back in bed and O2 2LNC given, BP taken again and accucheck taken at 114mg/dl.     2345  Felt better, color beginning to come back, now more pinkish.  Told patient that she cannot stand up or walk for now.  Offered bed pan but patient said she does not feel like peeing for now.  Patient has brief on also. Will monitor

## 2018-02-21 NOTE — SIGNIFICANT NOTE
02/21/18 0920   Rehab Treatment   Discipline physical therapist   Treatment Not Performed patient/family decline treatment, pt not feeling well  (c/o diarrhea when moving, will check pm)   Recommendation   PT - Next Appointment 02/21/18

## 2018-02-21 NOTE — THERAPY TREATMENT NOTE
Acute Care - Physical Therapy Treatment Note  [facility]     Patient Name: Cristal Sams  : 1927  MRN: [id number]  Today's Date: 2018  Onset of Illness/Injury or Date of Surgery Date: 18     Referring Physician: Edgar    Admit Date: 2018    Visit Dx:    ICD-10-CM ICD-9-CM   1. Influenza B J10.1 487.1   2. Seizure, potential R56.9 780.39   3. 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, estrogen receptor positive   • Influenza B   • Syncope   • Hyponatremia   • Hypoxia   • Bradycardia   • Convulsions   • Neutropenia associated with infection   • DNR (do not resuscitate)   • Nausea & vomiting   • Slow transit constipation               Adult Rehabilitation Note       18 1500 18 0852       Rehab Assessment/Intervention    Discipline physical therapist  -EE physical therapist  -     Document Type therapy note (daily note)  -EE therapy note (daily note)  -     Subjective Information agree to therapy;complains of;fatigue  -EE agree to therapy;complains of;pain  -CH     Patient Effort, Rehab Treatment good  -EE good  -CH     Symptoms Noted During/After Treatment none  -EE fatigue  -CH     Precautions/Limitations fall precautions;other (see comments)   droplet precautions  -EE fall precautions   infection precaution  -CH     Recorded by [EE] Maria Guadalupe Lezama, PT [CH] Ariane Saavedra, CRISTOPHER     Pain Assessment    Pain Assessment No/denies pain  -EE 0-10  -CH     Pain Score  4   pt reports she is sore from coughing  -CH     Pain Location  Rib cage  -CH     Pain Orientation  Left  -CH     Pain  Intervention(s)  Repositioned  -CH     Recorded by [EE] Maria Guadalupe Lezama PT [CH] Ariane Saavedra, PT     Cognitive Assessment/Intervention    Current Cognitive/Communication Assessment functional  -EE functional  -CH     Orientation Status oriented x 4  -EE oriented x 4  -CH     Follows Commands/Answers Questions 100% of the time  -% of the time  -CH     Personal Safety WNL/WFL  -EE WNL/WFL  -CH     Personal Safety Interventions fall prevention program maintained;gait belt;muscle strengthening facilitated;nonskid shoes/slippers when out of bed;supervised activity  -EE fall prevention program maintained;gait belt;nonskid shoes/slippers when out of bed  -CH     Recorded by [EE] Maria Guadalupe Lezama, PT [CH] Ariane Saavedra, PT     Bed Mobility, Assessment/Treatment    Bed Mobility, Scoot/Bridge, [place] supervision required  -EE      Bed Mob, Supine to Sit, [place] supervision required  -EE supervision required  -CH     Bed Mob, Sit to Supine, [place] supervision required  -EE contact guard assist  -CH     Bed Mobility, Impairments strength decreased  -EE      Bed Mobility, Comment  required some assistance to get feet back in bed  -CH     Recorded by [EE] Maria Guadalupe Lezama, PT [CH] Ariane Saavedra, PT     Transfer Assessment/Treatment    Transfers, Sit-Stand [place] contact guard assist  -EE verbal cues required;nonverbal cues required (demo/gesture);contact guard assist  -CH     Transfers, Stand-Sit [place] contact guard assist  -EE verbal cues required;nonverbal cues required (demo/gesture);contact guard assist  -CH     Transfers, Sit-Stand-Sit, Assist Device rolling walker  -EE rolling walker  -CH     Transfer, Impairments strength decreased  -EE      Recorded by [EE] Maria Guadalupe Lezama, PT [CH] Ariane Saavedra, PT     Gait Assessment/Treatment    Gait, [place] Level contact guard assist;verbal cues required  -EE verbal cues required;nonverbal cues required (demo/gesture);contact guard assist   -CH     Gait, Assistive Device rolling walker  -EE rolling walker  -CH     Gait, Distance (Feet) 120  -  -CH     Gait, Gait Deviations [place] decreased;step length decreased;narrow base  -EE [place] decreased;step length decreased;stride length decreased  -CH     Gait, Safety Issues step length decreased;balance decreased during turns  -EE      Gait, Impairments strength decreased;impaired balance  -EE      Gait, Comment Verbal cues to widen DON  -EE gait distance limited secondary to increased L flank pain  -CH     Recorded by [EE] Maria Guadalupe Lezama, PT [CH] Ariane Saavedra PT     Positioning and Restraints    Pre-Treatment Position in bed  -EE in bed  -CH     Post Treatment Position bed  -EE bed  -CH     In Bed fowlers;call light within reach;encouraged to call for assist;exit alarm on  -EE supine;call light within reach;encouraged to call for assist;exit alarm on  -CH     Recorded by [EE] Maria Guadalupe Lezama, PT [CH] Ariane Saavedra PT       User Key  (r) = Recorded By, (t) = Taken By, (c) = Cosigned By    Initials Name Effective Dates     Ariane Savaedra, PT 12/01/15 -     EE Maria Guadalupe Lezama, PT 12/01/15 -                 IP PT Goals       02/19/18 0901          Bed Mobility PT LTG    Bed Mobility PT LTG, Date Established 02/19/18  -EE      Bed Mobility PT LTG, Time to Achieve 1 wk  -EE      Bed Mobility PT LTG, Activity Type all bed mobility  -EE      Bed Mobility PT LTG, [place] Level conditional independence  -EE      Transfer Training PT LTG    Transfer Training PT LTG, Date Established 02/19/18  -EE      Transfer Training PT LTG, Time to Achieve 1 wk  -EE      Transfer Training PT LTG, Activity Type all transfers  -EE      Transfer Training PT LTG, [place] Level supervision required  -EE      Transfer Training PT LTG, Assist Device walker, rolling  -EE      Gait Training PT LTG    Gait Training Goal PT LTG, Date Established 02/19/18  -EE      Gait Training Goal PT LTG, Time to Achieve 1 wk  -EE       Gait Training Goal PT LTG, [place] Level supervision required  -EE      Gait Training Goal PT LTG, Assist Device walker, rolling  -EE      Gait Training Goal PT LTG, Distance to Achieve 150  -EE        User Key  (r) = Recorded By, (t) = Taken By, (c) = Cosigned By    Initials Name Provider Type    EE Maria Guadalupe Lezama, PT Physical Therapist          Physical Therapy Education     Title: PT OT SLP Therapies (Active)     Topic: Physical Therapy (Active)     Point: Mobility training (Done)    Learning Progress Summary    Learner Readiness Method Response Comment Documented by Status   Patient Acceptance E NR,VU  EE 02/21/18 1517 Done    Acceptance E,TB,D VU,NR   02/20/18 0856 Done    Acceptance E NR,VU  EE 02/19/18 0901 Done               Point: Body mechanics (Done)    Learning Progress Summary    Learner Readiness Method Response Comment Documented by Status   Patient Acceptance E NR,VU  EE 02/21/18 1517 Done    Acceptance E,TB VU  SB 02/20/18 1854 Done    Acceptance E,TB,D VU,NR   02/20/18 0856 Done    Acceptance E NR,VU  EE 02/19/18 0901 Done               Point: Precautions (Done)    Learning Progress Summary    Learner Readiness Method Response Comment Documented by Status   Patient Acceptance E,TB,D VU,NR   02/20/18 0856 Done                      User Key     Initials Effective Dates Name Provider Type Discipline     12/01/15 -  Ariane Saavedra, PT Physical Therapist PT     12/01/15 -  Maria Guadalupe Lezama, PT Physical Therapist PT     06/16/16 -  Chula Murillo, RN Registered Nurse Nurse                    PT Recommendation and Plan  Anticipated Equipment Needs At Discharge: front wheeled walker (rec use of walker for improved balance)  Anticipated Discharge Disposition: home with assist, home with home health  Planned Therapy Interventions: balance training, bed mobility training, gait training, home exercise program, patient/family education, strengthening, transfer training  PT Frequency: daily  Plan  of Care Review  Plan Of Care Reviewed With: patient  Progress: improving  Outcome Summary/Follow up Plan: Pt demonstrates improved endurance as evidenced by tolerance of increased gait distance. Cues for gait mechanics; no LOB observed during ambulation. No new PT concerns.           Outcome Measures       02/21/18 1500 02/20/18 0800 02/19/18 0900    How much help from another person do you currently need...    Turning from your back to your side while in flat bed without using bedrails? 4  -EE 4  -CH 4  -EE    Moving from lying on back to sitting on the side of a flat bed without bedrails? 4  -EE 3  -CH 3  -EE    Moving to and from a bed to a chair (including a wheelchair)? 3  -EE 3  -CH 3  -EE    Standing up from a chair using your arms (e.g., wheelchair, bedside chair)? 3  -EE 3  -CH 3  -EE    Climbing 3-5 steps with a railing? 3  -EE 2  -CH 2  -EE    To walk in hospital room? 3  -EE 3  -CH 3  -EE    AM-PAC 6 Clicks Score 20  -EE 18  -CH 18  -EE    Functional Assessment    Outcome Measure Options AM-PAC 6 Clicks Basic Mobility (PT)  -EE AM-PAC 6 Clicks Basic Mobility (PT)  -CH AM-PAC 6 Clicks Basic Mobility (PT)  -EE      User Key  (r) = Recorded By, (t) = Taken By, (c) = Cosigned By    Initials Name Provider Type    ALEYDA Saavedra, PT Physical Therapist    EE Maria Guadalupe Lezama, PT Physical Therapist           Time Calculation:         PT Charges       02/21/18 1518 02/21/18 0920       Time Calculation    Start Time 1500  -EE      Stop Time 1513  -EE      Time Calculation (min) 13 min  -EE      PT Received On 02/21/18  -EE      PT - Next Appointment 02/22/18  -EE 02/21/18  -EF       User Key  (r) = Recorded By, (t) = Taken By, (c) = Cosigned By    Initials Name Provider Type    VASILE Erwin, PT Physical Therapist    EE Maria Guadalupe Lezama, PT Physical Therapist          Therapy Charges for Today     Code Description Service Date Service Provider Modifiers Qty    60956960705 HC PT THER PROC EA 15 MIN 2/21/2018  Maria Guadalupe Lezama, PT GP 1          PT G-Codes  Outcome Measure Options: AM-PAC 6 Clicks Basic Mobility (PT)    Maria Guadalupe Lezama, PT  2/21/2018

## 2018-02-21 NOTE — PLAN OF CARE
Problem: Patient Care Overview (Adult)  Goal: Plan of Care Review   02/21/18 1517   Coping/Psychosocial Response Interventions   Plan Of Care Reviewed With patient   Patient Care Overview   Progress improving   Outcome Evaluation   Outcome Summary/Follow up Plan Pt demonstrates improved endurance as evidenced by tolerance of increased gait distance. Cues for gait mechanics; no LOB observed during ambulation. No new PT concerns.

## 2018-02-22 LAB
ANION GAP SERPL CALCULATED.3IONS-SCNC: 10.5 MMOL/L
BASOPHILS # BLD AUTO: 0.01 10*3/MM3 (ref 0–0.2)
BASOPHILS NFR BLD AUTO: 0.3 % (ref 0–1.5)
BUN BLD-MCNC: 7 MG/DL (ref 8–23)
BUN/CREAT SERPL: 17.5 (ref 7–25)
CALCIUM SPEC-SCNC: 8.5 MG/DL (ref 8.2–9.6)
CHLORIDE SERPL-SCNC: 102 MMOL/L (ref 98–107)
CO2 SERPL-SCNC: 22.5 MMOL/L (ref 22–29)
CREAT BLD-MCNC: 0.4 MG/DL (ref 0.57–1)
DEPRECATED RDW RBC AUTO: 44.8 FL (ref 37–54)
EOSINOPHIL # BLD AUTO: 0.04 10*3/MM3 (ref 0–0.7)
EOSINOPHIL NFR BLD AUTO: 1.1 % (ref 0.3–6.2)
ERYTHROCYTE [DISTWIDTH] IN BLOOD BY AUTOMATED COUNT: 12.9 % (ref 11.7–13)
GFR SERPL CREATININE-BSD FRML MDRD: 150 ML/MIN/1.73
GLUCOSE BLD-MCNC: 88 MG/DL (ref 65–99)
GLUCOSE BLDC GLUCOMTR-MCNC: 83 MG/DL (ref 70–130)
GLUCOSE BLDC GLUCOMTR-MCNC: 83 MG/DL (ref 70–130)
GLUCOSE BLDC GLUCOMTR-MCNC: 87 MG/DL (ref 70–130)
GLUCOSE BLDC GLUCOMTR-MCNC: 87 MG/DL (ref 70–130)
HCT VFR BLD AUTO: 35.5 % (ref 35.6–45.5)
HGB BLD-MCNC: 11.4 G/DL (ref 11.9–15.5)
IMM GRANULOCYTES # BLD: 0 10*3/MM3 (ref 0–0.03)
IMM GRANULOCYTES NFR BLD: 0 % (ref 0–0.5)
LYMPHOCYTES # BLD AUTO: 0.99 10*3/MM3 (ref 0.9–4.8)
LYMPHOCYTES NFR BLD AUTO: 28.1 % (ref 19.6–45.3)
MCH RBC QN AUTO: 30.3 PG (ref 26.9–32)
MCHC RBC AUTO-ENTMCNC: 32.1 G/DL (ref 32.4–36.3)
MCV RBC AUTO: 94.4 FL (ref 80.5–98.2)
MONOCYTES # BLD AUTO: 0.37 10*3/MM3 (ref 0.2–1.2)
MONOCYTES NFR BLD AUTO: 10.5 % (ref 5–12)
NEUTROPHILS # BLD AUTO: 2.11 10*3/MM3 (ref 1.9–8.1)
NEUTROPHILS NFR BLD AUTO: 60 % (ref 42.7–76)
PLATELET # BLD AUTO: 187 10*3/MM3 (ref 140–500)
PMV BLD AUTO: 10.9 FL (ref 6–12)
POTASSIUM BLD-SCNC: 3.5 MMOL/L (ref 3.5–5.2)
RBC # BLD AUTO: 3.76 10*6/MM3 (ref 3.9–5.2)
SODIUM BLD-SCNC: 135 MMOL/L (ref 136–145)
WBC NRBC COR # BLD: 3.52 10*3/MM3 (ref 4.5–10.7)

## 2018-02-22 PROCEDURE — 97110 THERAPEUTIC EXERCISES: CPT

## 2018-02-22 PROCEDURE — 99232 SBSQ HOSP IP/OBS MODERATE 35: CPT | Performed by: INTERNAL MEDICINE

## 2018-02-22 PROCEDURE — 82962 GLUCOSE BLOOD TEST: CPT

## 2018-02-22 PROCEDURE — 25010000002 ONDANSETRON PER 1 MG: Performed by: HOSPITALIST

## 2018-02-22 PROCEDURE — 80048 BASIC METABOLIC PNL TOTAL CA: CPT | Performed by: INTERNAL MEDICINE

## 2018-02-22 PROCEDURE — 85025 COMPLETE CBC W/AUTO DIFF WBC: CPT | Performed by: INTERNAL MEDICINE

## 2018-02-22 PROCEDURE — 94799 UNLISTED PULMONARY SVC/PX: CPT

## 2018-02-22 RX ADMIN — AMLODIPINE BESYLATE 5 MG: 5 TABLET ORAL at 08:58

## 2018-02-22 RX ADMIN — SODIUM CHLORIDE 50 ML/HR: 9 INJECTION, SOLUTION INTRAVENOUS at 00:45

## 2018-02-22 RX ADMIN — ALBUTEROL SULFATE 2.5 MG: 2.5 SOLUTION RESPIRATORY (INHALATION) at 07:04

## 2018-02-22 RX ADMIN — SCOPALAMINE 1 PATCH: 1 PATCH, EXTENDED RELEASE TRANSDERMAL at 16:05

## 2018-02-22 RX ADMIN — ONDANSETRON 4 MG: 2 INJECTION INTRAMUSCULAR; INTRAVENOUS at 12:25

## 2018-02-22 RX ADMIN — LISINOPRIL 20 MG: 20 TABLET ORAL at 08:58

## 2018-02-22 RX ADMIN — FAMOTIDINE 20 MG: 20 TABLET, FILM COATED ORAL at 08:58

## 2018-02-22 RX ADMIN — ALBUTEROL SULFATE 2.5 MG: 2.5 SOLUTION RESPIRATORY (INHALATION) at 20:48

## 2018-02-22 RX ADMIN — ROSUVASTATIN CALCIUM 5 MG: 5 TABLET, FILM COATED ORAL at 08:58

## 2018-02-22 RX ADMIN — PANTOPRAZOLE SODIUM 40 MG: 40 TABLET, DELAYED RELEASE ORAL at 08:57

## 2018-02-22 NOTE — PROGRESS NOTES
Name: Cristal Sams ADMIT: 2018   : 1927  PCP: George Rock MD    MRN: [id number] LOS: 6 days   AGE/SEX: 89 y.o. female  ROOM: [facility]/   Subjective   Subjective  Cc- cough    Diarrhea gone but persistent nausea and not eating for fear of vomiting but no actual vomiting  Worked with PT  Cough is better  Still some weakness continues        ROS  No f/c  +n/-v  No cp/palp  No soa/cough  Objective   Vital Signs  Temp:  [97.6 °F (36.4 °C)-98.9 °F (37.2 °C)] 97.6 °F (36.4 °C)  Heart Rate:  [56-79] 56  Resp:  [15-18] 16  BP: (137-158)/(67-70) 137/68  SpO2:  [93 %-97 %] 93 %  on   ;   O2 Device: (P) room air  Body mass index is 24.03 kg/(m^2).    Physical Exam    Alert, sitting in chair  Chronically ill  Supple, no jvd  Elderly, some muscle atrophy  RRR, no murmurs  Soft, nt  No edema or cyanosis  Lungs clear no resp distress  Pleasant, appropriate    Results Review:       I reviewed the patient's new clinical results.    Results from last 7 days  Lab Units 18  04418  0450 18  0442 18  0551 18  0632 18  0446 18  1541   WBC 10*3/mm3 3.52* 5.84 2.55* 2.32* 3.07* 3.28* 3.71*   HEMOGLOBIN g/dL 11.4* 12.1 11.1* 10.8* 11.1* 10.8* 11.0*   PLATELETS 10*3/mm3 187 173 172 176 177 188 211       Results from last 7 days  Lab Units 18  0440 18  0450 18  0442 18  0632 18  0446 18  1541   SODIUM mmol/L 135* 130* 134* 136 131* 128*   POTASSIUM mmol/L 3.5 4.0 3.2* 3.8 3.9 4.3   CHLORIDE mmol/L 102 96* 96* 99 96* 91*   CO2 mmol/L 22.5 21.9* 26.0 24.5 23.4 24.6   BUN mg/dL 7* 11 7* 7* 11 13   CREATININE mg/dL 0.40* 0.50* 0.52* 0.57 0.61 0.77   GLUCOSE mg/dL 88 112* 94 90 87 103*   Estimated Creatinine Clearance: 32.1 mL/min (by C-G formula based on Cr of 0.4).    Results from last 7 days  Lab Units 18  0440 18  0450 18  0442 18  0632 18  0446 18  1541   CALCIUM mg/dL 8.5 8.6 8.7 8.1* 8.3 8.8   ALBUMIN  g/dL  --  3.30*  --   --   --  3.70   MAGNESIUM mg/dL  --  2.0  --   --   --   --      MRI Brain With & Without Contrast [[id number]] Not Reviewed        Order Status: Completed Collected: 02/17/18 0900        Updated: 02/17/18 0919       Narrative:         MRI BRAIN W WO CONTRAST-     INDICATIONS: Seizures     TECHNIQUE: Multiplanar multisequence magnetic resonance imaging of the  brain, without and with intravenous contrast material.     COMPARISON: Head CT from 02/16/2018     FINDINGS:     The diffusion-weighted images show no restricted diffusion to suggest  acute infarct. Small encephalomalacia/old infarct change is apparent  laterally adjacent to the posterior horn of the left lateral ventricle.        The midline structures appear unremarkable.     The brain parenchyma shows moderate periventricular white matter T2  FLAIR signal hyperintensity suggesting chronic small vessel ischemic  change in a patient this age. A few scattered small foci of  susceptibility artifact in the bilateral cerebrum, nonspecific, could  for example be evidence of amyloid angiopathy or sequela of old  hypertensive microhemorrhages. No enhancing lesions of brain are  identified. Vascular flow related artifact is present on the  postcontrast images.     Flow voids in the major arteries at the base of the brain appear  unremarkable.     Partial opacification of the sphenoid sinus. The paranasal sinuses,  mastoid air cells, and orbits otherwise appear unremarkable.          Impression:         No acute infarct. No enhancing lesion in brain. Chronic-appearing  changes. Follow-up as indications persist.           This report was finalized on 2/17/2018 9:15 AM by Dr. Levon Grissom MD.          XR Chest 2 View [[id number]] Not Reviewed       Order Status: Completed Collected: 02/16/18 2113        Updated: 02/17/18 1153       Narrative:         EMERGENCY PA AND LATERAL CHEST X-RAY 02/16/2018     CLINICAL HISTORY: Cough, diagnosed with  flu today.      COMPARISON: Correlated to prior chest CT 01/26/2018.     FINDINGS: There are clips in the left axilla from a previous left  axillary dissection. There has been a previous left mastectomy. The  cardiomediastinal silhouette and pulmonary vasculature are within normal  limits. There are scattered tiny noncalcified and calcified nodules in  the lungs likely granulomas. The remainder of the lungs are clear. The  costophrenic angles are sharp.          Impression:            1. No change when compared to chest CT from [facility] on 01/26/2018. No definite active disease is seen in the  chest. There has been a previous left mastectomy and left axillary  dissection.     This report was finalized on 2/17/2018 11:50 AM by Dr. George Balbuena MD.          CT Head Without Contrast [[id number]] Not Reviewed       Order Status: Completed Collected: 02/16/18 1612        Updated: 02/16/18 1622       Narrative:         CT HEAD WITHOUT CONTRAST     CLINICAL HISTORY: Syncope versus seizure.     CT scan of the head was obtained with 3 mm axial images. No intravenous  contrast was administered.     FINDINGS:     The ventricles, sulci, and cisterns are age appropriate. There are  mild-to-moderate changes of chronic small vessel ischemic phenomena.  There is either an enlarged perivascular space or chronic infarct within  the right aspect of the midbrain measuring up to 3 mm in diameter.  Otherwise, the posterior fossa structures are within normal limits.     The extracranial structures are incidentally notable for mucosal  thickening within the sphenoid sinus. There is a mucous retention cyst  within the right aspect of the sphenoid sinus.          Impression:            No evidence for acute intracranial pathology. There is no convincing  evidence to suggest a seizure focus. However, further evaluation could  be performed with MR imaging for much more sensitive and specific  detection of a potential  seizure focus if clinically indicated. These  findings and recommendations were directly discussed with Dr. Serrano on  02/16/2018 at approximately 4:18 PM.         XR Abdomen 2 View With Chest 1 View [[id number]] Not Reviewed        Order Status: Completed Collected: 02/19/18 1546        Updated: 02/19/18 1550       Narrative:         CHEST  ABDOMEN 2 VIEWS     HISTORY: 89-year-old female with nausea vomiting and flulike symptoms  presents for evaluation. HISTORY includes left mastectomy for  malignancy, GERD, asthma, diabetes, hypertension, vertigo     COMPARISON: 02/16/2018     FINDINGS:  1. Stable negative acute chest.  2. Mild scoliosis diffuse vascular calcification.  3. Normal bowel gas pattern without obstruction, free air nor  dilatation.          Impression:         1. Negative acute chest, negative acute abdomen            albuterol 2.5 mg Nebulization BID - RT   amLODIPine 5 mg Oral Daily   insulin aspart 0-7 Units Subcutaneous 4x Daily With Meals & Nightly   lisinopril 20 mg Oral Daily   pantoprazole 40 mg Oral QAM   rosuvastatin 5 mg Oral Daily   Scopolamine 1 patch Transdermal Q72H   sennosides-docusate sodium 2 tablet Oral BID       sodium chloride 50 mL/hr Last Rate: 50 mL/hr (02/22/18 0045)   Diet Regular; Consistent Carbohydrate      Assessment/Plan   Active Hospital Problems (** Indicates Principal Problem)    Diagnosis Date Noted   • **Syncope [R55] 02/16/2018   • Slow transit constipation [K59.01] 02/20/2018   • DNR (do not resuscitate) [Z66] 02/19/2018   • Nausea & vomiting [R11.2] 02/19/2018   • Neutropenia associated with infection [D70.3] 02/18/2018   • Hypoxia [R09.02] 02/17/2018   • Bradycardia [R00.1] 02/17/2018   • Convulsions [R56.9] 02/17/2018   • Influenza B [J10.1] 02/16/2018   • Hyponatremia [E87.1] 02/16/2018   • DM (diabetes mellitus), type 2 [E11.9] 12/16/2016   • Essential hypertension [I10] 12/16/2016      Resolved Hospital Problems    Diagnosis Date Noted Date Resolved   No  resolved problems to display.       Ms. Sams is a 89 y.o. female who has had symptoms of influenza for a few days who has been admitted with syncope with convulsions at doctors office    · Diarrhea: resolved now, suspect viral cause but checking C. Diff.  Recheck labs in am  · Persistent nausea suspect related to influenza.  Also not taking meds as prescribed but has self reported h/o PUD so will ask GI to see.  Cont PPI but hold famotidine  · Suspect vaso-vagal syncope. She states had nausea before the episode and had been feeling ill due to the flu.    · Cardiology following for bradycardia/syncope.   · Patient declined tamiflu. Did have some hypoxia - some supplemental O2 during hospitalization as needed but none now. Ordered nocturnal O2 last night to see if needs home O2 but no results yet  · Hyponatremia improved. Replace K   · Neutropenia likely related to flu, will follow  · N/V . KUB was normal. scopalamine patch, pepcid BID. Still with some nausea- PRN zofran  · DNR/DNI  · Added agents for constipation    Reviewed previous records    Had planned on discharge today but  Having persistent weakness and nausea (and 89 y/o). Will hold one day but likely discharge to home 2/23    Mathieu Nesbitt MD  [facility]  02/22/18  1:33 PM

## 2018-02-22 NOTE — PLAN OF CARE
Problem: Patient Care Overview (Adult)  Goal: Plan of Care Review  Outcome: Ongoing (interventions implemented as appropriate)   02/22/18 0321   Coping/Psychosocial Response Interventions   Plan Of Care Reviewed With patient   Patient Care Overview   Progress improving   Outcome Evaluation   Outcome Summary/Follow up Plan Felt better today, slept well, no fainting, VSS, sinus hoa in the monitor, x1 assist to the bathroom, no complaints of nausea, IVF maintained, will continue to monitor     Goal: Adult Individualization and Mutuality  Outcome: Ongoing (interventions implemented as appropriate)   02/22/18 0321   Individualization   Patient Specific Preferences likes diet coke and peanut butter   Patient Specific Goals monitor for any fainting and nausea   Patient Specific Interventions help when needed, HR and nausea monitored       Problem: Fall Risk (Adult)  Goal: Absence of Falls  Outcome: Ongoing (interventions implemented as appropriate)   02/22/18 0321   Fall Risk (Adult)   Absence of Falls making progress toward outcome       Problem: Respiratory Insufficiency (Adult)  Goal: Acid/Base Balance  Outcome: Ongoing (interventions implemented as appropriate)   02/22/18 0321   Respiratory Insufficiency (Adult)   Acid/Base Balance making progress toward outcome     Goal: Effective Ventilation  Outcome: Ongoing (interventions implemented as appropriate)   02/22/18 0321   Respiratory Insufficiency (Adult)   Effective Ventilation making progress toward outcome

## 2018-02-22 NOTE — THERAPY TREATMENT NOTE
Acute Care - Physical Therapy Treatment Note  [facility]     Patient Name: Cristal Sams  : 1927  MRN: [id number]  Today's Date: 2018  Onset of Illness/Injury or Date of Surgery Date: 18     Referring Physician: Egdar    Admit Date: 2018    Visit Dx:    ICD-10-CM ICD-9-CM   1. Influenza B J10.1 487.1   2. Seizure, potential R56.9 780.39   3. 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, estrogen receptor positive   • Influenza B   • Syncope   • Hyponatremia   • Hypoxia   • Bradycardia   • Convulsions   • Neutropenia associated with infection   • DNR (do not resuscitate)   • Nausea & vomiting   • Slow transit constipation               Adult Rehabilitation Note       18 0930 18 1500 18 0852    Rehab Assessment/Intervention    Discipline physical therapist  -CH physical therapist  -EE physical therapist  -CH    Document Type therapy note (daily note)  -CH therapy note (daily note)  -EE therapy note (daily note)  -    Subjective Information agree to therapy  - agree to therapy;complains of;fatigue  -EE agree to therapy;complains of;pain  -CH    Patient Effort, Rehab Treatment good  -CH good  -EE good  -CH    Symptoms Noted During/After Treatment none  -CH none  -EE fatigue  -CH    Precautions/Limitations fall precautions   infection precaution  -CH fall precautions;other (see comments)   droplet precautions  -EE fall precautions   infection precaution  -CH    Recorded by [CH] Ariane Saavedra, PT [EE] Maria Guadalupe Lezama, PT [CH] Ariane Saavedra, PT    Pain Assessment    Pain Assessment  No/denies pain  -CH No/denies pain  -EE 0-10  -CH    Pain Score   4   pt reports she is sore from coughing  -CH    Pain Location   Rib cage  -CH    Pain Orientation   Left  -CH    Pain Intervention(s)   Repositioned  -    Recorded by [CH] Ariane Saavedra, PT [EE] Maria Guadalupe Lezama PT [CH] Ariane Saavedra, PT    Cognitive Assessment/Intervention    Current Cognitive/Communication Assessment functional  -CH functional  -EE functional  -CH    Orientation Status oriented x 4  -CH oriented x 4  -EE oriented x 4  -CH    Follows Commands/Answers Questions 100% of the time  -% of the time  -% of the time  -CH    Personal Safety WNL/WFL  -CH WNL/WFL  -EE WNL/WFL  -CH    Personal Safety Interventions fall prevention program maintained;gait belt;nonskid shoes/slippers when out of bed  -CH fall prevention program maintained;gait belt;muscle strengthening facilitated;nonskid shoes/slippers when out of bed;supervised activity  -EE fall prevention program maintained;gait belt;nonskid shoes/slippers when out of bed  -    Recorded by [CH] Ariane Saavedra, PT [EE] Maria Guadalupe Lezama, PT [CH] Ariane Saavedra, PT    Bed Mobility, Assessment/Treatment    Bed Mobility, Scoot/Bridge, [place]  supervision required  -EE     Bed Mob, Supine to Sit, [place] supervision required  - supervision required  -EE supervision required  -CH    Bed Mob, Sit to Supine, [place] supervision required  - supervision required  -EE contact guard assist  -CH    Bed Mobility, Impairments  strength decreased  -EE     Bed Mobility, Comment   required some assistance to get feet back in bed  -    Recorded by [CH] Ariane Saavedra, PT [EE] Maria Guadalupe Lezama, PT [CH] Ariane Saavedra, PT    Transfer Assessment/Treatment    Transfers, Sit-Stand [place] verbal cues required;nonverbal cues required (demo/gesture);contact guard assist;hand held assist  - contact guard assist  -EE verbal cues required;nonverbal cues required  (demo/gesture);contact guard assist  -CH    Transfers, Stand-Sit [place] verbal cues required;nonverbal cues required (demo/gesture);contact guard assist  -CH contact guard assist  -EE verbal cues required;nonverbal cues required (demo/gesture);contact guard assist  -CH    Transfers, Sit-Stand-Sit, Assist Device rolling walker  -CH rolling walker  -EE rolling walker  -CH    Transfer, Impairments  strength decreased  -EE     Transfer, Comment pt with impaired balance upon standing, requiring CGA to correct  -CH      Recorded by [CH] Ariane Saavedra PT [EE] Maria uGadalupe Lezama PT [CH] Ariane Saavedra, PT    Gait Assessment/Treatment    Gait, [place] Level verbal cues required;nonverbal cues required (demo/gesture);contact guard assist  -CH contact guard assist;verbal cues required  -EE verbal cues required;nonverbal cues required (demo/gesture);contact guard assist  -CH    Gait, Assistive Device rolling walker  -CH rolling walker  -EE rolling walker  -CH    Gait, Distance (Feet) 150  -  -  -CH    Gait, Gait Deviations [place] decreased;step length decreased;narrow base  -CH [place] decreased;step length decreased;narrow base  -EE [place] decreased;step length decreased;stride length decreased  -CH    Gait, Safety Issues step length decreased;balance decreased during turns  -CH step length decreased;balance decreased during turns  -EE     Gait, Impairments strength decreased;impaired balance  -CH strength decreased;impaired balance  -EE     Gait, Comment pt ambulated first 15ft with HHA and CGA  -CH Verbal cues to widen DON  -EE gait distance limited secondary to increased L flank pain  -CH    Recorded by [CH] Ariane Saavedra, PT [EE] Maria Guadalupe Lezama, PT [CH] Arinae Saavedra, PT    Positioning and Restraints    Pre-Treatment Position in bed  -CH in bed  -EE in bed  -CH    Post Treatment Position bed  -CH bed  -EE bed  -CH    In Bed supine;call light within reach;encouraged to call for assist;exit  alarm on  -CH fowlers;call light within reach;encouraged to call for assist;exit alarm on  -EE supine;call light within reach;encouraged to call for assist;exit alarm on  -CH    Recorded by [CH] Ariane Saavedra, PT [EE] Maria Guadalupe Lezama, PT [CH] Ariane Saavedra, PT      User Key  (r) = Recorded By, (t) = Taken By, (c) = Cosigned By    Initials Name Effective Dates     Ariane Saavedra, PT 12/01/15 -     EE Maria Guadalupe Lezama, PT 12/01/15 -                 IP PT Goals       02/19/18 0901          Bed Mobility PT LTG    Bed Mobility PT LTG, Date Established 02/19/18  -EE      Bed Mobility PT LTG, Time to Achieve 1 wk  -EE      Bed Mobility PT LTG, Activity Type all bed mobility  -EE      Bed Mobility PT LTG, [place] Level conditional independence  -EE      Transfer Training PT LTG    Transfer Training PT LTG, Date Established 02/19/18  -EE      Transfer Training PT LTG, Time to Achieve 1 wk  -EE      Transfer Training PT LTG, Activity Type all transfers  -EE      Transfer Training PT LTG, [place] Level supervision required  -EE      Transfer Training PT LTG, Assist Device walker, rolling  -EE      Gait Training PT LTG    Gait Training Goal PT LTG, Date Established 02/19/18  -EE      Gait Training Goal PT LTG, Time to Achieve 1 wk  -EE      Gait Training Goal PT LTG, [place] Level supervision required  -EE      Gait Training Goal PT LTG, Assist Device walker, rolling  -EE      Gait Training Goal PT LTG, Distance to Achieve 150  -EE        User Key  (r) = Recorded By, (t) = Taken By, (c) = Cosigned By    Initials Name Provider Type    EE Maria Guadalupe Lezama, PT Physical Therapist          Physical Therapy Education     Title: PT OT SLP Therapies (Active)     Topic: Physical Therapy (Active)     Point: Mobility training (Done)    Learning Progress Summary    Learner Readiness Method Response Comment Documented by Status   Patient Acceptance E,TB,D MICHELLE PERAZA   02/22/18 1025 Done    Acceptance E STU MARTINEZ   02/21/18 5696  Done    Acceptance E,TB,D VU,NR  CH 02/20/18 0856 Done    Acceptance E NR,VU  EE 02/19/18 0901 Done               Point: Body mechanics (Done)    Learning Progress Summary    Learner Readiness Method Response Comment Documented by Status   Patient Acceptance E,TB,D VU,NR  CH 02/22/18 1025 Done    Acceptance E NR,VU  EE 02/21/18 1517 Done    Acceptance E,TB VU  SB 02/20/18 1854 Done    Acceptance E,TB,D VU,NR  CH 02/20/18 0856 Done    Acceptance E NR,VU  EE 02/19/18 0901 Done               Point: Precautions (Done)    Learning Progress Summary    Learner Readiness Method Response Comment Documented by Status   Patient Acceptance E,TB,D VU,NR   02/22/18 1025 Done    Acceptance E,TB,D VU,NR   02/20/18 0856 Done                      User Key     Initials Effective Dates Name Provider Type Discipline     12/01/15 -  Ariane Saavedra, PT Physical Therapist PT     12/01/15 -  Maria Guadalupe Lezama, PT Physical Therapist PT     06/16/16 -  Chula Murillo RN Registered Nurse Nurse                    PT Recommendation and Plan  Anticipated Equipment Needs At Discharge: front wheeled walker (rec use of walker for improved balance)  Anticipated Discharge Disposition: home with assist, home with home health  Planned Therapy Interventions: balance training, bed mobility training, gait training, home exercise program, patient/family education, strengthening, transfer training  PT Frequency: daily  Plan of Care Review  Plan Of Care Reviewed With: patient  Outcome Summary/Follow up Plan: Pt demonstrates increased activity tolerance and functional strength as she was able to increase her gait distance. Pt was also able to walk a short distance without an AD. Pt will continue to follow to address strength, balance, and gait.          Outcome Measures       02/22/18 1000 02/21/18 1500 02/20/18 0800    How much help from another person do you currently need...    Turning from your back to your side while in flat bed without using  bedrails? 4  -CH 4  -EE 4  -CH    Moving from lying on back to sitting on the side of a flat bed without bedrails? 4  -CH 4  -EE 3  -CH    Moving to and from a bed to a chair (including a wheelchair)? 3  -CH 3  -EE 3  -CH    Standing up from a chair using your arms (e.g., wheelchair, bedside chair)? 3  -CH 3  -EE 3  -CH    Climbing 3-5 steps with a railing? 3  -CH 3  -EE 2  -CH    To walk in hospital room? 3  -CH 3  -EE 3  -CH    AM-PAC 6 Clicks Score 20  -CH 20  -EE 18  -CH    Functional Assessment    Outcome Measure Options AM-PAC 6 Clicks Basic Mobility (PT)  -CH AM-PAC 6 Clicks Basic Mobility (PT)  -EE AM-PAC 6 Clicks Basic Mobility (PT)  -CH      User Key  (r) = Recorded By, (t) = Taken By, (c) = Cosigned By    Initials Name Provider Type     Ariane Saavedra, PT Physical Therapist    EE Maria Guadalupe Lezama PT Physical Therapist           Time Calculation:         PT Charges       02/22/18 1026          Time Calculation    Start Time 0957  -      Stop Time 1013  -CH      Time Calculation (min) 16 min  -      PT Received On 02/22/18  -      PT - Next Appointment 02/23/18  -        User Key  (r) = Recorded By, (t) = Taken By, (c) = Cosigned By    Initials Name Provider Type     Ariane Saavedra, PT Physical Therapist          Therapy Charges for Today     Code Description Service Date Service Provider Modifiers Qty    79667189759 HC PT THER PROC EA 15 MIN 2/22/2018 Ariane Saavedra, PT GP 1          PT G-Codes  Outcome Measure Options: AM-PAC 6 Clicks Basic Mobility (PT)    Ariane Saavedra, PT  2/22/2018

## 2018-02-22 NOTE — PLAN OF CARE
Problem: Patient Care Overview (Adult)  Goal: Plan of Care Review  Outcome: Ongoing (interventions implemented as appropriate)   02/22/18 1025   Coping/Psychosocial Response Interventions   Plan Of Care Reviewed With patient   Outcome Evaluation   Outcome Summary/Follow up Plan Pt demonstrates increased activity tolerance and functional strength as she was able to increase her gait distance. Pt was also able to walk a short distance without an AD. Pt will continue to follow to address strength, balance, and gait.

## 2018-02-22 NOTE — PLAN OF CARE
Problem: Patient Care Overview (Adult)  Goal: Plan of Care Review  Outcome: Ongoing (interventions implemented as appropriate)   02/22/18 8006   Coping/Psychosocial Response Interventions   Plan Of Care Reviewed With patient   Outcome Evaluation   Outcome Summary/Follow up Plan pt. has has walked to and from the bathroom a couple times and tolerated well. she reported pain and nausea once and was given nausea medicine but refused pain med. she is coperative, asking questions and very much involved in her care.     Goal: Adult Individualization and Mutuality  Outcome: Ongoing (interventions implemented as appropriate)    Goal: Discharge Needs Assessment  Outcome: Ongoing (interventions implemented as appropriate)      Problem: Fall Risk (Adult)  Goal: Absence of Falls  Outcome: Ongoing (interventions implemented as appropriate)      Problem: Respiratory Insufficiency (Adult)  Goal: Acid/Base Balance  Outcome: Ongoing (interventions implemented as appropriate)    Goal: Effective Ventilation  Outcome: Ongoing (interventions implemented as appropriate)

## 2018-02-22 NOTE — CONSULTS
[facility] ([facility]  Initial Inpatient Consult Note    Referring Provider: Dr. George Rock    Reason for Consultation: Nausea    Subjective     History of present illness:    89 y.o. female admitted on the 16th of this month for nausea with symptoms of influenza.  She denies any symptoms of nausea prior to her admission.  She has had extensive workup in the past by Dr. Pastor Crews, per her account, recently with upper and lower endoscopic evaluations performed.  Review of her care everywhere records indicates colonoscopy was performed last May with 2 polyps removed.  She denies any abdominal pain, no reported vomiting.  She does admit to intolerance to medications which may be a contributing factor.    Past Medical History:  Past Medical History:   Diagnosis Date   • Anemia    • Arthritis    • Breast cancer     LEFT   • Colon polyp    • Diabetes mellitus     DIET CONTROLLED   • GERD (gastroesophageal reflux disease)    • H/O Cataract    • Hiatal hernia    • History of hepatitis 1958   • History of kidney stone    • History of peptic ulcer    • History of vertigo    • Hyperlipidemia    • Hypertension    • Scoliosis    • Unexplained weight loss     #43 lb in 3 months    • Visual impairment      Past Surgical History:  Past Surgical History:   Procedure Laterality Date   • CATARACT EXTRACTION Bilateral 2008   • CATARACT EXTRACTION     • COLONOSCOPY W/ BIOPSIES     • DILATATION AND CURETTAGE     • EYE SURGERY      cataract extraction   • MASTECTOMY WITH SENTINEL NODE BIOPSY AND AXILLARY NODE DISSECTION Left 12/7/2017    Procedure:  left total mastectomy, left axillary sentinel lymph node biopsy x 3 ;  Surgeon: Madison Ponce MD;  Location[place] [facility] [place];  Service:    • SINUS SURGERY      CAUTERIZE A BLEED      Social History:   Social History   Substance Use Topics   • Smoking status: Never Smoker   • Smokeless tobacco: Never Used   • Alcohol use No      Family History:  Family History   Problem  Relation Age of Onset   • Uterine cancer Mother    • Coronary artery disease Mother    • Lung cancer Brother    • Heart attack Brother    • Heart disease Brother    • Malig Hyperthermia Neg Hx        Home Meds:  Facility-Administered Medications Prior to Admission   Medication Dose Route Frequency Provider Last Rate Last Dose   • [DISCONTINUED] cyanocobalamin injection 1,000 mcg  1,000 mcg Intramuscular Q28 Days George Rock MD   1,000 mcg at 09/08/17 1219     Prescriptions Prior to Admission   Medication Sig Dispense Refill Last Dose   • amLODIPine (NORVASC) 5 MG tablet Take 1 tablet by mouth Daily. (Patient taking differently: Take 5 mg by mouth Daily As Needed.) 30 tablet 3 Taking   • esomeprazole (nexIUM) 40 MG capsule Take 40 mg by mouth Every Morning Before Breakfast.   Taking   • lisinopril (PRINIVIL,ZESTRIL) 40 MG tablet Take 40 mg by mouth Daily.   2/15/2018 at Unknown time   • rosuvastatin (CRESTOR) 5 MG tablet Take 5 mg by mouth Daily.   2/14/2018 at Unknown time   • vitamin D (ERGOCALCIFEROL) 53542 units capsule capsule Take 50,000 Units by mouth Every 14 (Fourteen) Days. Sunday, Wednesday 2/14/2018   • glucose blood (ONE TOUCH ULTRA TEST) test strip Test Blood Sugar Once Daily 100 each 2 Taking     Current Meds:     albuterol 2.5 mg Nebulization BID - RT   amLODIPine 5 mg Oral Daily   insulin aspart 0-7 Units Subcutaneous 4x Daily With Meals & Nightly   lisinopril 20 mg Oral Daily   pantoprazole 40 mg Oral QAM   rosuvastatin 5 mg Oral Daily   Scopolamine 1 patch Transdermal Q72H   sennosides-docusate sodium 2 tablet Oral BID     Allergies:  Allergies   Allergen Reactions   • Contrast Dye    • Novocain [Procaine] Palpitations and Parasthesia     LEG GET NUMB   • Aleve [Naproxen Sodium] GI Intolerance   • Cortizone-10 [Hydrocortisone] Other (See Comments)     UNSURE   • Eye Drops [Naphazoline-Polyethyl Glycol] Other (See Comments)     UNSURE   • Lipitor [Atorvastatin] Other (See Comments)     UNSURE    • Sulfur Other (See Comments)     UNSURE   • Valium [Diazepam] Other (See Comments)     Doesn't like the way it makes her feel   • Zocor [Simvastatin] Other (See Comments)     UNSURE   • Zyrtec [Cetirizine] Other (See Comments)     UNSURE     Review of Systems  Pertinent items are noted in HPI, all other systems reviewed and negative     Objective     Vital Signs  Temp:  [97.6 °F (36.4 °C)-98.9 °F (37.2 °C)] 97.6 °F (36.4 °C)  Heart Rate:  [56-79] 56  Resp:  [15-18] 16  BP: (137-158)/(67-70) 137/68  Physical Exam:  General Appearance:    Alert, cooperative, in no acute distress   Head:    Normocephalic, without obvious abnormality, atraumatic   Eyes:            Lids and lashes normal, conjunctivae and sclerae normal, no   icterus   Throat:   No oral lesions, no thrush, oral mucosa moist   Neck:   No adenopathy, supple, trachea midline, no thyromegaly, no   carotid bruit, no JVD   Lungs:     Clear to auscultation,respirations regular, even and                   unlabored    Heart:    Regular rhythm and normal rate, normal S1 and S2, no            murmur, no gallop, no rub, no click   Chest Wall:    No abnormalities observed   Abdomen:     Normal bowel sounds, no masses, no organomegaly, soft        non-tender, non-distended, no guarding, no rebound                 tenderness   Rectal:     Deferred   Extremities:   no edema, no cyanosis, no redness   Skin:   No bleeding, bruising or rash   Lymph nodes:   No palpable adenopathy   Psychiatric:  Judgement and insight: normal   Orientation to person place and time: normal   Mood and affect: normal   Results Review:   I reviewed the patient's new clinical results.      Results from last 7 days  Lab Units 02/22/18 0440 02/21/18 0450 02/20/18 0442   WBC 10*3/mm3 3.52* 5.84 2.55*   HEMOGLOBIN g/dL 11.4* 12.1 11.1*   HEMATOCRIT % 35.5* 38.4 33.9*   PLATELETS 10*3/mm3 187 173 172       Results from last 7 days  Lab Units 02/22/18 0440 02/21/18 0450 02/20/18 0442   02/16/18  1541   SODIUM mmol/L 135* 130* 134*  < > 128*   POTASSIUM mmol/L 3.5 4.0 3.2*  < > 4.3   CHLORIDE mmol/L 102 96* 96*  < > 91*   CO2 mmol/L 22.5 21.9* 26.0  < > 24.6   BUN mg/dL 7* 11 7*  < > 13   CREATININE mg/dL 0.40* 0.50* 0.52*  < > 0.77   CALCIUM mg/dL 8.5 8.6 8.7  < > 8.8   BILIRUBIN mg/dL  --  0.3  --   --  0.2   ALK PHOS U/L  --  58  --   --  59   ALT (SGPT) U/L  --  14  --   --  13   AST (SGOT) U/L  --  19  --   --  23   GLUCOSE mg/dL 88 112* 94  < > 103*   < > = values in this interval not displayed.      No results found for: LIPASE    Radiology:  XR Abdomen 2 View With Chest 1 View   Final Result   1. Negative acute chest, negative acute abdomen       This report was finalized on 2/19/2018 3:47 PM by Dr. Domenic Groves MD.          XR Chest 2 View   Final Result       1. No change when compared to chest CT from [facility] on 01/26/2018. No definite active disease is seen in the   chest. There has been a previous left mastectomy and left axillary   dissection.       This report was finalized on 2/17/2018 11:50 AM by Dr. George Balbuena MD.          MRI Brain With & Without Contrast   Final Result   No acute infarct. No enhancing lesion in brain. Chronic-appearing   changes. Follow-up as indications persist.               This report was finalized on 2/17/2018 9:15 AM by Dr. Levon Grissom MD.          CT Head Without Contrast   Final Result       No evidence for acute intracranial pathology. There is no convincing   evidence to suggest a seizure focus. However, further evaluation could   be performed with MR imaging for much more sensitive and specific   detection of a potential seizure focus if clinically indicated. These   findings and recommendations were directly discussed with Dr. Serrano on   02/16/2018 at approximately 4:18 PM.       Changes of inflammatory paranasal sinus disease are incidentally noted   and discussed in detail above.       Radiation dose reduction  techniques were utilized, including automated   exposure control and exposure modulation based on body size.       This report was finalized on 2/16/2018 4:19 PM by Dr. Avel Miranda MD.              Assessment/Plan   Patient Active Problem List   Diagnosis   • Osteoarthritis of multiple joints   • Hyperlipemia   • Gastroesophageal reflux disease with esophagitis   • B12 deficiency   • Essential hypertension   • Anemia   • DM (diabetes mellitus), type 2   • Heme positive stool   • Ataxia   • Hypercalcemia   • Malignant neoplasm of female breast   • Malignant neoplasm of central portion of left breast in female, estrogen receptor positive   • Influenza B   • Syncope   • Hyponatremia   • Hypoxia   • Bradycardia   • Convulsions   • Neutropenia associated with infection   • DNR (do not resuscitate)   • Nausea & vomiting   • Slow transit constipation   Impression  #1 nausea: Associated with underlying upper respiratory infection, cannot rule out other possible etiologies, does not appear to be a gastric outlet obstruction given that she has no vomiting  #2 history of polyps  #3 history of breast cancer  #4 diabetes mellitus      Recommendations  Symptomatic treatment for nausea  Observe once  Symptoms improve if her overall condition improves, we'll refer her to her gastroenterologist of record if she wants further workup in the outpatient setting.      I discussed the patients findings and my recommendations with patient.    Hunter Ramsey MD

## 2018-02-23 VITALS
DIASTOLIC BLOOD PRESSURE: 74 MMHG | RESPIRATION RATE: 16 BRPM | TEMPERATURE: 97.9 F | WEIGHT: 96 LBS | HEIGHT: 55 IN | BODY MASS INDEX: 22.21 KG/M2 | SYSTOLIC BLOOD PRESSURE: 156 MMHG | HEART RATE: 58 BPM | OXYGEN SATURATION: 97 %

## 2018-02-23 PROBLEM — R09.02 HYPOXIA: Status: RESOLVED | Noted: 2018-02-17 | Resolved: 2018-02-23

## 2018-02-23 PROBLEM — E87.1 HYPONATREMIA: Status: RESOLVED | Noted: 2018-02-16 | Resolved: 2018-02-23

## 2018-02-23 PROBLEM — R56.9 CONVULSIONS (HCC): Status: RESOLVED | Noted: 2018-02-17 | Resolved: 2018-02-23

## 2018-02-23 PROBLEM — K59.01 SLOW TRANSIT CONSTIPATION: Status: RESOLVED | Noted: 2018-02-20 | Resolved: 2018-02-23

## 2018-02-23 LAB — GLUCOSE BLDC GLUCOMTR-MCNC: 93 MG/DL (ref 70–130)

## 2018-02-23 PROCEDURE — 82962 GLUCOSE BLOOD TEST: CPT

## 2018-02-23 PROCEDURE — 94799 UNLISTED PULMONARY SVC/PX: CPT

## 2018-02-23 PROCEDURE — 97110 THERAPEUTIC EXERCISES: CPT

## 2018-02-23 RX ORDER — ONDANSETRON 4 MG/1
4 TABLET, FILM COATED ORAL EVERY 6 HOURS PRN
Qty: 30 TABLET | Refills: 0 | Status: SHIPPED | OUTPATIENT
Start: 2018-02-23 | End: 2018-06-25

## 2018-02-23 RX ORDER — SCOLOPAMINE TRANSDERMAL SYSTEM 1 MG/1
1 PATCH, EXTENDED RELEASE TRANSDERMAL
Qty: 10 PATCH | Refills: 0 | Status: SHIPPED | OUTPATIENT
Start: 2018-02-25 | End: 2018-06-25

## 2018-02-23 RX ORDER — ONDANSETRON 4 MG/1
4 TABLET, FILM COATED ORAL EVERY 6 HOURS PRN
Status: DISCONTINUED | OUTPATIENT
Start: 2018-02-23 | End: 2018-02-23 | Stop reason: HOSPADM

## 2018-02-23 RX ORDER — BENZONATATE 100 MG/1
100 CAPSULE ORAL 3 TIMES DAILY PRN
Qty: 30 CAPSULE | Refills: 0 | Status: SHIPPED | OUTPATIENT
Start: 2018-02-23 | End: 2018-06-25

## 2018-02-23 RX ADMIN — AMLODIPINE BESYLATE 5 MG: 5 TABLET ORAL at 08:33

## 2018-02-23 RX ADMIN — ALBUTEROL SULFATE 2.5 MG: 2.5 SOLUTION RESPIRATORY (INHALATION) at 07:45

## 2018-02-23 RX ADMIN — LISINOPRIL 20 MG: 20 TABLET ORAL at 08:33

## 2018-02-23 RX ADMIN — ROSUVASTATIN CALCIUM 5 MG: 5 TABLET, FILM COATED ORAL at 08:33

## 2018-02-23 NOTE — THERAPY TREATMENT NOTE
Acute Care - Physical Therapy Treatment Note  [facility]     Patient Name: Cristal Sams  : 1927  MRN: [id number]  Today's Date: 2018  Onset of Illness/Injury or Date of Surgery Date: 18     Referring Physician: Edgar    Admit Date: 2018    Visit Dx:    ICD-10-CM ICD-9-CM   1. Influenza B J10.1 487.1   2. Seizure, potential R56.9 780.39   3. 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, estrogen receptor positive   • Influenza B   • Syncope   • Bradycardia   • Neutropenia associated with infection   • DNR (do not resuscitate)   • Nausea & vomiting               Adult Rehabilitation Note       18 0908 18 0930 18 1500    Rehab Assessment/Intervention    Discipline physical therapy assistant  - physical therapist  - physical therapist  -EE    Document Type therapy note (daily note)  - therapy note (daily note)  - therapy note (daily note)  -EE    Subjective Information agree to therapy;complains of;nausea/vomiting  - agree to therapy  - agree to therapy;complains of;fatigue  -EE    Patient Effort, Rehab Treatment good  -SM good  -CH good  -EE    Symptoms Noted During/After Treatment  none  -CH none  -EE    Precautions/Limitations fall precautions  - fall precautions   infection precaution  - fall precautions;other (see comments)   droplet precautions  -EE    Recorded by [] Chula Stout, PTA [CH] Ariane Saavedra, PT [EE] Maria Guadalupe Lezama, PT    Pain Assessment    Pain Assessment No/denies pain  - No/denies pain  - No/denies pain  -EE    Recorded by [] Chula  Justina Stout PTA [CH] Ariane Saavedra, PT [EE] Maria Guadalupe Lezama, PT    Cognitive Assessment/Intervention    Current Cognitive/Communication Assessment functional  -SM functional  -CH functional  -EE    Orientation Status oriented x 4  -SM oriented x 4  -CH oriented x 4  -EE    Follows Commands/Answers Questions 100% of the time  -% of the time  -% of the time  -EE    Personal Safety WNL/WFL  -SM WNL/WFL  -CH WNL/WFL  -EE    Personal Safety Interventions fall prevention program maintained;gait belt;nonskid shoes/slippers when out of bed  - fall prevention program maintained;gait belt;nonskid shoes/slippers when out of bed  - fall prevention program maintained;gait belt;muscle strengthening facilitated;nonskid shoes/slippers when out of bed;supervised activity  -EE    Recorded by [SM] Chula Stout, RENÉ [CH] Ariane Saavedra, PT [EE] Maria Guadalupe Lezama, PT    Bed Mobility, Assessment/Treatment    Bed Mobility, Scoot/Bridge, Imperial   supervision required  -EE    Bed Mob, Supine to Sit, [place] supervision required  - supervision required  - supervision required  -EE    Bed Mob, Sit to Supine, Imperial not tested   up in chair  - supervision required  - supervision required  -EE    Bed Mobility, Impairments strength decreased  -  strength decreased  -EE    Recorded by [SM] Chula Stout, RENÉ [CH] Ariane Saavedra, PT [EE] Maria Guadalupe Lezama, PT    Transfer Assessment/Treatment    Transfers, Sit-Stand Imperial contact guard assist  -SM verbal cues required;nonverbal cues required (demo/gesture);contact guard assist;hand held assist  -CH contact guard assist  -EE    Transfers, Stand-Sit [place] contact guard assist  -SM verbal cues required;nonverbal cues required (demo/gesture);contact guard assist  -CH contact guard assist  -EE    Transfers, Sit-Stand-Sit, Assist Device rolling walker  -SM rolling walker  -CH rolling walker  -EE    Transfer, Impairments strength decreased   -SM  strength decreased  -EE    Transfer, Comment  pt with impaired balance upon standing, requiring CGA to correct  -CH     Recorded by [SM] Chula Stout PTA [CH] Ariane Saavedra, PT [EE] Maria Guadalupe Lezama, PT    Gait Assessment/Treatment    Gait, [place] Level contact guard assist  -SM verbal cues required;nonverbal cues required (demo/gesture);contact guard assist  -CH contact guard assist;verbal cues required  -EE    Gait, Assistive Device rolling walker  -SM rolling walker  -CH rolling walker  -EE    Gait, Distance (Feet) 150  -  -  -EE    Gait, Gait Deviations alton decreased;forward flexed posture;step length decreased  -SM alton decreased;step length decreased;narrow base  - alton decreased;step length decreased;narrow base  -EE    Gait, Safety Issues step length decreased  - step length decreased;balance decreased during turns  -CH step length decreased;balance decreased during turns  -EE    Gait, Impairments strength decreased  -SM strength decreased;impaired balance  - strength decreased;impaired balance  -EE    Gait, Comment  pt ambulated first 15ft with HHA and CGA  -CH Verbal cues to widen DNO  -EE    Recorded by [SM] Chula Stout PTA [CH] Ariane Saavedra, PT [EE] Maria Guadalupe Lezama, CRISTOPHER    Positioning and Restraints    Pre-Treatment Position in bed  -SM in bed  -CH in bed  -EE    Post Treatment Position chair  -SM bed  - bed  -EE    In Bed  supine;call light within reach;encouraged to call for assist;exit alarm on  -CH fowlers;call light within reach;encouraged to call for assist;exit alarm on  -EE    In Chair reclined;call light within reach;encouraged to call for assist;exit alarm on;with nsg  -SM      Recorded by [SM] Chula Stout PTA [CH] Ariane Saavedra, PT [EE] Maria Guadalupe Lezama, PT      User Key  (r) = Recorded By, (t) = Taken By, (c) = Cosigned By    Initials Name Effective Dates     Ariane Saavedra, PT 12/01/15 -     EE Maria Guadalupe Lezama, PT 12/01/15 -       Chula Horn Girish, PTA 10/25/17 -                 IP PT Goals       02/19/18 0901          Bed Mobility PT LTG    Bed Mobility PT LTG, Date Established 02/19/18  -EE      Bed Mobility PT LTG, Time to Achieve 1 wk  -EE      Bed Mobility PT LTG, Activity Type all bed mobility  -EE      Bed Mobility PT LTG, [place] Level conditional independence  -EE      Transfer Training PT LTG    Transfer Training PT LTG, Date Established 02/19/18  -EE      Transfer Training PT LTG, Time to Achieve 1 wk  -EE      Transfer Training PT LTG, Activity Type all transfers  -EE      Transfer Training PT LTG, [place] Level supervision required  -EE      Transfer Training PT LTG, Assist Device walker, rolling  -EE      Gait Training PT LTG    Gait Training Goal PT LTG, Date Established 02/19/18  -EE      Gait Training Goal PT LTG, Time to Achieve 1 wk  -EE      Gait Training Goal PT LTG, [place] Level supervision required  -EE      Gait Training Goal PT LTG, Assist Device walker, rolling  -EE      Gait Training Goal PT LTG, Distance to Achieve 150  -EE        User Key  (r) = Recorded By, (t) = Taken By, (c) = Cosigned By    Initials Name Provider Type    EE Maria Guadalupe Lezama, PT Physical Therapist          Physical Therapy Education     Title: PT OT SLP Therapies (Active)     Topic: Physical Therapy (Active)     Point: Mobility training (Done)    Learning Progress Summary    Learner Readiness Method Response Comment Documented by Status   Patient Acceptance E VU,NR   02/23/18 0953 Done    Acceptance E,TB,D VU,NR   02/22/18 1025 Done    Acceptance E NR,VU   02/21/18 1517 Done    Acceptance E,TB,D VU,NR   02/20/18 0856 Done    Acceptance E NR,VU  EE 02/19/18 0901 Done               Point: Body mechanics (Done)    Learning Progress Summary    Learner Readiness Method Response Comment Documented by Status   Patient Acceptance E VU,NR   02/23/18 0953 Done    Acceptance E,TB,D VU,NR   02/22/18 1025 Done    Acceptance E  NR,VU   02/21/18 1517 Done    Acceptance E,TB VU  SB 02/20/18 1854 Done    Acceptance E,TB,D VU,NR   02/20/18 0856 Done    Acceptance E NR,VU   02/19/18 0901 Done               Point: Precautions (Done)    Learning Progress Summary    Learner Readiness Method Response Comment Documented by Status   Patient Acceptance E VU,NR   02/23/18 0953 Done    Acceptance E,TB,D VU,NR   02/22/18 1025 Done    Acceptance E,TB,D VU,NR   02/20/18 0856 Done                      User Key     Initials Effective Dates Name Provider Type Discipline     12/01/15 -  Ariane Saavedra, PT Physical Therapist PT     12/01/15 -  Maria Guadalupe Lezama, PT Physical Therapist PT     10/25/17 -  Chula Stout, PTA Physical Therapy Assistant PT     06/16/16 -  Chula Murillo, RN Registered Nurse Nurse                    PT Recommendation and Plan  Anticipated Equipment Needs At Discharge: front wheeled walker (rec use of walker for improved balance)  Anticipated Discharge Disposition: home with assist, home with home health  Planned Therapy Interventions: balance training, bed mobility training, gait training, home exercise program, patient/family education, strengthening, transfer training  PT Frequency: daily  Plan of Care Review  Plan Of Care Reviewed With: patient  Progress: progress toward functional goals as expected  Outcome Summary/Follow up Plan: Pt tolerated treatment well this date although w/ c/o nausea. Ambulated 150ft w/ Rw and CGA. Plan to dc home today.          Outcome Measures       02/23/18 0900 02/22/18 1000 02/21/18 1500    How much help from another person do you currently need...    Turning from your back to your side while in flat bed without using bedrails? 4  -SM 4  -CH 4  -EE    Moving from lying on back to sitting on the side of a flat bed without bedrails? 4  -SM 4  -CH 4  -EE    Moving to and from a bed to a chair (including a wheelchair)? 3  -SM 3  -CH 3  -EE    Standing up from a chair using your arms  (e.g., wheelchair, bedside chair)? 3  -SM 3  -CH 3  -EE    Climbing 3-5 steps with a railing? 3  -SM 3  -CH 3  -EE    To walk in hospital room? 3  -SM 3  -CH 3  -EE    AM-PAC 6 Clicks Score 20  -SM 20  -CH 20  -EE    Functional Assessment    Outcome Measure Options AM-PAC 6 Clicks Basic Mobility (PT)  -SM AM-PAC 6 Clicks Basic Mobility (PT)  -CH AM-PAC 6 Clicks Basic Mobility (PT)  -EE      User Key  (r) = Recorded By, (t) = Taken By, (c) = Cosigned By    Initials Name Provider Type     Ariane Saavedra, PT Physical Therapist     Maria Guadalupe Lezama, PT Physical Therapist     Chula Stout PTA Physical Therapy Assistant           Time Calculation:         PT Charges       02/23/18 0955          Time Calculation    Start Time 0908  -      Stop Time 0927  -      Time Calculation (min) 19 min  -      PT Received On 02/23/18  -      PT - Next Appointment 02/24/18  -        User Key  (r) = Recorded By, (t) = Taken By, (c) = Cosigned By    Initials Name Provider Type     Chula Stout PTA Physical Therapy Assistant          Therapy Charges for Today     Code Description Service Date Service Provider Modifiers Qty    23074423198 HC PT THER PROC EA 15 MIN 2/23/2018 Chula Stout PTA GP 1          PT G-Codes  Outcome Measure Options: AM-PAC 6 Clicks Basic Mobility (PT)    Chula Stout PTA  2/23/2018

## 2018-02-23 NOTE — PROGRESS NOTES
Discharge Planning Assessment  Psychiatric     Patient Name: Cristal Sams  MRN: [id number]  Today's Date: 2/23/2018    Admit Date: 2/16/2018          Discharge Needs Assessment     None            Discharge Plan       02/23/18 1059    Final Note    Final Note Patient has discharge orders and 1 week f/u appointment with Dr Amos for March 2nd 2018 @ 4547. Terese Elise RN          Discharge Placement     No information found        Expected Discharge Date and Time     Expected Discharge Date Expected Discharge Time    Feb 23, 2018               Demographic Summary     None            Functional Status     None            Psychosocial     None            Abuse/Neglect     None            Legal     None            Substance Abuse     None            Patient Forms     None          Terese Elise, RN

## 2018-02-23 NOTE — DISCHARGE SUMMARY
Name: Cristal Sams  Age: 89 y.o.  Sex: female  :  1927  MRN: [id number]         Primary Care Physician: George Rock MD      Date of Admission:  2018  Date of Discharge:  2018      CHIEF COMPLAINT  Vomiting and Syncope      DISCHARGE DIAGNOSIS  Active Hospital Problems (** Indicates Principal Problem)    Diagnosis Date Noted   • **Influenza B [J10.1] 2018   • DNR (do not resuscitate) [Z66] 2018   • Nausea & vomiting [R11.2] 2018   • Neutropenia associated with infection [D70.3] 2018   • Bradycardia [R00.1] 2018   • Syncope [R55] 2018   • DM (diabetes mellitus), type 2 [E11.9] 2016   • Essential hypertension [I10] 2016      Resolved Hospital Problems    Diagnosis Date Noted Date Resolved   • Slow transit constipation [K59.01] 2018   • Hypoxia [R09.02] 2018   • Convulsions [R56.9] 2018   • Hyponatremia [E87.1] 2018       SECONDARY DIAGNOSES  Past Medical History:   Diagnosis Date   • Anemia    • Arthritis    • Breast cancer     LEFT   • Colon polyp    • Diabetes mellitus     DIET CONTROLLED   • GERD (gastroesophageal reflux disease)    • H/O Cataract    • Hiatal hernia    • History of hepatitis    • History of kidney stone    • History of peptic ulcer    • History of vertigo    • Hyperlipidemia    • Hypertension    • Scoliosis    • Unexplained weight loss     #43 lb in 3 months    • Visual impairment        CONSULTS   Consult Orders (all)     Start     Ordered    18 1333  Inpatient Consult to Gastroenterology  Once     Specialty:  Gastroenterology  Provider: Hunter Ramsey MD    18 1332    18 1204  Inpatient Consult to Cardiology  Once     Specialty:  Cardiology  Provider:  Irwin Thorne MD    18 1203    18  Inpatient Consult to Neurology  Once     Specialty:  Neurology  Provider:  Bg Garcia MD    18             PROCEDURES PERFORMED    2D ECHO    Interpretation Summary      · Calculated EF = 64.5%  · Left ventricular systolic function is normal.  · Left ventricular diastolic (grade I) consistent with impaired relaxation.  · Mild aortic valve regurgitation is present.  · Mild mitral valve regurgitation is present  · Mild tricuspid valve regurgitation is present.  · Estimated right ventricular systolic pressure from tricuspid regurgitation is normal (<35 mmHg).     MRI BRAIN W WO CONTRAST-      IMPRESSION:  No acute infarct. No enhancing lesion in brain. Chronic-appearing  changes. Follow-up as indications persist.      CT HEAD WITHOUT CONTRAST      IMPRESSION:      No evidence for acute intracranial pathology. There is no convincing  evidence to suggest a seizure focus. However, further evaluation could  be performed with MR imaging for much more sensitive and specific  detection of a potential seizure focus if clinically indicated. These  findings and recommendations were directly discussed with Dr. Serrano on  02/16/2018 at approximately 4:18 PM.      Study Result      CHEST  ABDOMEN 2 VIEWS      FINDINGS:  1. Stable negative acute chest.  2. Mild scoliosis diffuse vascular calcification.  3. Normal bowel gas pattern without obstruction, free air nor  dilatation.      IMPRESSION:  1. Negative acute chest, negative acute abdomen     EMERGENCY PA AND LATERAL CHEST X-RAY 02/16/2018      IMPRESSION:      1. No change when compared to chest CT from [facility] on 01/26/2018. No definite active disease is seen in the  chest. There has been a previous left mastectomy and left axillary  dissection.      HOSPITAL COURSE    The patient is a 89-year-old female with a history of diet-controlled diabetes, breast cancer and hypertension who came to the hospital for an apparent syncopal episode at her physician's office.  She was seeing her primary care doctor for flulike illness and tested positive for  influenza in their office.  She came to the hospital given the syncopal episode and was admitted to our service.  Please see history and physical for complete details.  She was diagnosed with vasovagal syncope.  She had nausea prior to the episode.  Orthostatics were negative multiple times.  She had some bradycardia and cardiology saw her for this but they did not feel like it was the cause of her syncope and no further workup or treatment was required.  Neurology was consulted for the supposedly convulsions during the syncope.  MRI brain was done which was negative.  She was treated with IV fluids and did have some hypoxia initially to 86%.  This has resolved.    Should also be noted the patient declined Tamiflu.  I'm unclear on the reasons why.  She has had persistent nausea during this hospitalization and according to the patient has a weak stomach and does not much at baseline.  KUB has been negative.  Given the persistent nausea I consulted GI and they felt like it was related to her current illness with the influenza.  She has been using scopolamine patch and Zofran although she's only been using Zofran once a day and has been refusing to take it more than this.  She had one day of diarrhea and this resolved.  He did not have any bearing on her nausea though.  The patient is medically stable and is cleared for discharge today.  I've recommended home with home health but she has declined stating that her sister and her niece can take care of her.    PHYSICAL EXAM  Temp:  [97.6 °F (36.4 °C)-97.9 °F (36.6 °C)] 97.9 °F (36.6 °C)  Heart Rate:  [47-59] 58  Resp:  [16-18] 16  BP: (139-156)/(51-78) 156/74  Body mass index is 24.03 kg/(m^2).  Physical Exam  Alert, sitting in chair  Chronically ill  Supple, no jvd  Elderly, some muscle atrophy  RRR, no murmurs  Soft, nt  No edema or cyanosis  Lungs clear no resp distress  Pleasant, appropriate    CONDITION ON DISCHARGE  Stable.      DISCHARGE DISPOSITION   Home with  family      ALLERGIES  Allergies   Allergen Reactions   • Contrast Dye    • Novocain [Procaine] Palpitations and Parasthesia     LEG GET NUMB   • Aleve [Naproxen Sodium] GI Intolerance   • Cortizone-10 [Hydrocortisone] Other (See Comments)     UNSURE   • Eye Drops [Naphazoline-Polyethyl Glycol] Other (See Comments)     UNSURE   • Lipitor [Atorvastatin] Other (See Comments)     UNSURE   • Sulfur Other (See Comments)     UNSURE   • Valium [Diazepam] Other (See Comments)     Doesn't like the way it makes her feel   • Zocor [Simvastatin] Other (See Comments)     UNSURE   • Zyrtec [Cetirizine] Other (See Comments)     UNSURE         DISCHARGE MEDICATIONS     Your medication list      START taking these medications       Instructions Last Dose Given Next Dose Due    benzonatate 100 MG capsule   Commonly known as:  TESSALON        Take 1 capsule by mouth 3 (Three) Times a Day As Needed for Cough.         ondansetron 4 MG tablet   Commonly known as:  ZOFRAN        Take 1 tablet by mouth Every 6 (Six) Hours As Needed for Nausea or Vomiting.         Scopolamine 1.5 MG/3DAYS patch   Commonly known as:  TRANSDERM-SCOP   Start taking on:  2/25/2018        Place 1 patch on the skin Every 72 (Seventy-Two) Hours.           CHANGE how you take these medications       Instructions Last Dose Given Next Dose Due    amLODIPine 5 MG tablet   Commonly known as:  NORVASC   What changed:    - when to take this  - reasons to take this        Take 1 tablet by mouth Daily.           CONTINUE taking these medications       Instructions Last Dose Given Next Dose Due    esomeprazole 40 MG capsule   Commonly known as:  nexIUM              glucose blood test strip   Commonly known as:  ONE TOUCH ULTRA TEST        Test Blood Sugar Once Daily         lisinopril 40 MG tablet   Commonly known as:  PRINIVIL,ZESTRIL              rosuvastatin 5 MG tablet   Commonly known as:  CRESTOR              vitamin D 70811 units capsule capsule   Commonly known as:   ERGOCALCIFEROL                   Where to Get Your Medications      These medications were sent to [facility] [address] [place] [address] - [address] - [phone]  - [phone] FX  [address] [facility] [address]     Phone:  [phone]    • benzonatate 100 MG capsule   • ondansetron 4 MG tablet   • Scopolamine 1.5 MG/3DAYS patch           Diet Instructions     Advance Diet As Tolerated                  Activity Instructions     Activity as Tolerated                 Future Appointments  Date Time Provider Department Center   3/9/2018 2:00 PM LAB CHAIR 2 [facility]   3/9/2018 2:40 PM Milly Penaloza MD [facility]   5/2/2018 2:20 PM Merle Iglesias MD [facility] None   6/29/2018 10:30 AM Madison Ponce MD [facility] None     Follow-up Information     Follow up with George Rock MD Follow up in 1 week(s).    Specialty:  Internal Medicine    Contact information:    [facility] [place] [id number] [phone]            TEST  RESULTS PENDING AT DISCHARGE  None     CODE STATUS  Conditional Code        Mathieu Nesbitt MD  [facility]  02/23/18  9:21 AM      Time: greater than 30 minutes.

## 2018-02-23 NOTE — PLAN OF CARE
Problem: Patient Care Overview (Adult)  Goal: Plan of Care Review  Outcome: Ongoing (interventions implemented as appropriate)   02/23/18 0922   Coping/Psychosocial Response Interventions   Plan Of Care Reviewed With patient   Patient Care Overview   Progress progress toward functional goals as expected   Outcome Evaluation   Outcome Summary/Follow up Plan Pt tolerated treatment well this date although w/ c/o nausea. Ambulated 150ft w/ Rw and CGA. Plan to dc home today.

## 2018-02-23 NOTE — PLAN OF CARE
Problem: Patient Care Overview (Adult)  Goal: Plan of Care Review  Outcome: Ongoing (interventions implemented as appropriate)   02/23/18 0312   Coping/Psychosocial Response Interventions   Plan Of Care Reviewed With patient   Patient Care Overview   Progress improving   Outcome Evaluation   Outcome Summary/Follow up Plan Feeling much better, no nausea, slept fine all night, up to bathrrom with x1 assist, IVF maintained, VSS, sinus hoa when asleep, no stool yet to test for cdiff, will continue to monitor     Goal: Adult Individualization and Mutuality  Outcome: Ongoing (interventions implemented as appropriate)   02/23/18 0312   Individualization   Patient Specific Preferences likes diet coke   Patient Specific Goals monitor for nausea and syncopal episodes   Patient Specific Interventions bed alarm on, VSS monitored and accucheck     Goal: Discharge Needs Assessment   02/23/18 0312   Discharge Needs Assessment   Concerns To Be Addressed basic needs concerns   Readmission Within The Last 30 Days no previous admission in last 30 days   Equipment Needed After Discharge walker, standard   Discharge Facility/Level Of Care Needs home with home health   Current Discharge Risk lack of support system/caregiver   Discharge Disposition still a patient;home or self-care   Discharge Planning Comments needs at least someone to check on patient from time to time, patient lives with 97 y/o sister   Current Health   Outpatient/Agency/Support Group Needs homecare agency (specify level of care)   Anticipated Changes Related to Illness inability to care for self;inability to care for someone else   Self-Care   Equipment Currently Used at Home none   Living Environment   Transportation Available car;family or friend will provide       Problem: Fall Risk (Adult)  Goal: Absence of Falls  Outcome: Ongoing (interventions implemented as appropriate)   02/23/18 0312   Fall Risk (Adult)   Absence of Falls making progress toward outcome        Problem: Respiratory Insufficiency (Adult)  Goal: Acid/Base Balance  Outcome: Ongoing (interventions implemented as appropriate)   02/23/18 0312   Respiratory Insufficiency (Adult)   Acid/Base Balance making progress toward outcome     Goal: Effective Ventilation  Outcome: Ongoing (interventions implemented as appropriate)   02/23/18 0312   Respiratory Insufficiency (Adult)   Effective Ventilation making progress toward outcome

## 2018-02-26 NOTE — PROGRESS NOTES
Case Management Discharge Note    Final Note: Home    Discharge Placement     No information found        Other: Other (Per private vehicle)    Discharge Codes: 01  Discharge to home

## 2018-03-07 ENCOUNTER — OFFICE VISIT (OUTPATIENT)
Dept: FAMILY MEDICINE CLINIC | Facility: CLINIC | Age: 83
End: 2018-03-07

## 2018-03-07 VITALS
TEMPERATURE: 98.2 F | HEART RATE: 70 BPM | OXYGEN SATURATION: 98 % | WEIGHT: 92.6 LBS | HEIGHT: 55 IN | DIASTOLIC BLOOD PRESSURE: 60 MMHG | BODY MASS INDEX: 21.43 KG/M2 | SYSTOLIC BLOOD PRESSURE: 140 MMHG | RESPIRATION RATE: 12 BRPM

## 2018-03-07 DIAGNOSIS — E78.5 HYPERLIPIDEMIA, UNSPECIFIED HYPERLIPIDEMIA TYPE: ICD-10-CM

## 2018-03-07 DIAGNOSIS — R79.89 LOW VITAMIN D LEVEL: ICD-10-CM

## 2018-03-07 DIAGNOSIS — J18.9 PNEUMONIA OF LOWER LOBE DUE TO INFECTIOUS ORGANISM, UNSPECIFIED LATERALITY: Primary | ICD-10-CM

## 2018-03-07 DIAGNOSIS — I10 ESSENTIAL HYPERTENSION: ICD-10-CM

## 2018-03-07 PROBLEM — J10.00 PNEUMONIA DUE TO INFLUENZA A VIRUS: Status: ACTIVE | Noted: 2018-03-07

## 2018-03-07 PROBLEM — J10.00 PNEUMONIA DUE TO INFLUENZA A VIRUS: Status: RESOLVED | Noted: 2018-03-07 | Resolved: 2018-03-07

## 2018-03-07 LAB
25(OH)D3 SERPL-MCNC: 87.4 NG/ML (ref 30–100)
ALBUMIN SERPL-MCNC: 3.8 G/DL (ref 3.5–5.2)
ALBUMIN/GLOB SERPL: 1.3 G/DL
ALP SERPL-CCNC: 69 U/L (ref 39–117)
ALT SERPL W P-5'-P-CCNC: 13 U/L (ref 1–33)
ANION GAP SERPL CALCULATED.3IONS-SCNC: 12.8 MMOL/L
AST SERPL-CCNC: 14 U/L (ref 1–32)
BASOPHILS # BLD AUTO: 0.03 10*3/MM3 (ref 0–0.2)
BASOPHILS NFR BLD AUTO: 0.4 % (ref 0–1.5)
BILIRUB SERPL-MCNC: 0.4 MG/DL (ref 0.1–1.2)
BUN BLD-MCNC: 7 MG/DL (ref 8–23)
BUN/CREAT SERPL: 12.3 (ref 7–25)
CALCIUM SPEC-SCNC: 10.2 MG/DL (ref 8.2–9.6)
CHLORIDE SERPL-SCNC: 98 MMOL/L (ref 98–107)
CHOLEST SERPL-MCNC: 145 MG/DL (ref 0–200)
CO2 SERPL-SCNC: 29.2 MMOL/L (ref 22–29)
CREAT BLD-MCNC: 0.57 MG/DL (ref 0.57–1)
DEPRECATED RDW RBC AUTO: 45.9 FL (ref 37–54)
EOSINOPHIL # BLD AUTO: 0.03 10*3/MM3 (ref 0–0.7)
EOSINOPHIL NFR BLD AUTO: 0.4 % (ref 0.3–6.2)
ERYTHROCYTE [DISTWIDTH] IN BLOOD BY AUTOMATED COUNT: 13.2 % (ref 11.7–13)
FOLATE SERPL-MCNC: >20 NG/ML (ref 4.78–24.2)
GFR SERPL CREATININE-BSD FRML MDRD: 100 ML/MIN/1.73
GLOBULIN UR ELPH-MCNC: 3 GM/DL
GLUCOSE BLD-MCNC: 90 MG/DL (ref 65–99)
HCT VFR BLD AUTO: 38.4 % (ref 35.6–45.5)
HDLC SERPL-MCNC: 62 MG/DL (ref 40–60)
HGB BLD-MCNC: 12.2 G/DL (ref 11.9–15.5)
IMM GRANULOCYTES # BLD: 0 10*3/MM3 (ref 0–0.03)
IMM GRANULOCYTES NFR BLD: 0 % (ref 0–0.5)
LDLC SERPL CALC-MCNC: 57 MG/DL (ref 0–100)
LDLC/HDLC SERPL: 0.92 {RATIO}
LYMPHOCYTES # BLD AUTO: 2.25 10*3/MM3 (ref 0.9–4.8)
LYMPHOCYTES NFR BLD AUTO: 30.2 % (ref 19.6–45.3)
MCH RBC QN AUTO: 30.6 PG (ref 26.9–32)
MCHC RBC AUTO-ENTMCNC: 31.8 G/DL (ref 32.4–36.3)
MCV RBC AUTO: 96.2 FL (ref 80.5–98.2)
MONOCYTES # BLD AUTO: 0.63 10*3/MM3 (ref 0.2–1.2)
MONOCYTES NFR BLD AUTO: 8.5 % (ref 5–12)
NEUTROPHILS # BLD AUTO: 4.51 10*3/MM3 (ref 1.9–8.1)
NEUTROPHILS NFR BLD AUTO: 60.5 % (ref 42.7–76)
PLATELET # BLD AUTO: 487 10*3/MM3 (ref 140–500)
PMV BLD AUTO: 11.3 FL (ref 6–12)
POTASSIUM BLD-SCNC: 3.6 MMOL/L (ref 3.5–5.2)
PROT SERPL-MCNC: 6.8 G/DL (ref 6–8.5)
RBC # BLD AUTO: 3.99 10*6/MM3 (ref 3.9–5.2)
SODIUM BLD-SCNC: 140 MMOL/L (ref 136–145)
T-UPTAKE NFR SERPL: 1.18 TBI (ref 0.8–1.3)
T4 SERPL-MCNC: 8.5 MCG/DL (ref 4.5–11.7)
TRIGL SERPL-MCNC: 129 MG/DL (ref 0–150)
TSH SERPL DL<=0.05 MIU/L-ACNC: 1.67 MIU/ML (ref 0.27–4.2)
VIT B12 BLD-MCNC: 1063 PG/ML (ref 211–946)
VLDLC SERPL-MCNC: 25.8 MG/DL (ref 5–40)
WBC NRBC COR # BLD: 7.45 10*3/MM3 (ref 4.5–10.7)

## 2018-03-07 PROCEDURE — 84479 ASSAY OF THYROID (T3 OR T4): CPT | Performed by: INTERNAL MEDICINE

## 2018-03-07 PROCEDURE — 99214 OFFICE O/P EST MOD 30 MIN: CPT | Performed by: INTERNAL MEDICINE

## 2018-03-07 PROCEDURE — 82306 VITAMIN D 25 HYDROXY: CPT | Performed by: INTERNAL MEDICINE

## 2018-03-07 PROCEDURE — 84443 ASSAY THYROID STIM HORMONE: CPT | Performed by: INTERNAL MEDICINE

## 2018-03-07 PROCEDURE — 80061 LIPID PANEL: CPT | Performed by: INTERNAL MEDICINE

## 2018-03-07 PROCEDURE — 82746 ASSAY OF FOLIC ACID SERUM: CPT | Performed by: INTERNAL MEDICINE

## 2018-03-07 PROCEDURE — 36415 COLL VENOUS BLD VENIPUNCTURE: CPT | Performed by: INTERNAL MEDICINE

## 2018-03-07 PROCEDURE — 85025 COMPLETE CBC W/AUTO DIFF WBC: CPT | Performed by: INTERNAL MEDICINE

## 2018-03-07 PROCEDURE — 82607 VITAMIN B-12: CPT | Performed by: INTERNAL MEDICINE

## 2018-03-07 PROCEDURE — 80053 COMPREHEN METABOLIC PANEL: CPT | Performed by: INTERNAL MEDICINE

## 2018-03-07 PROCEDURE — 84436 ASSAY OF TOTAL THYROXINE: CPT | Performed by: INTERNAL MEDICINE

## 2018-03-07 RX ORDER — LISINOPRIL 40 MG/1
40 TABLET ORAL DAILY
Qty: 30 TABLET | Refills: 3 | Status: SHIPPED | OUTPATIENT
Start: 2018-03-07 | End: 2018-03-07

## 2018-03-07 RX ORDER — CYANOCOBALAMIN 1000 UG/ML
1000 INJECTION, SOLUTION INTRAMUSCULAR; SUBCUTANEOUS
Status: DISCONTINUED | OUTPATIENT
Start: 2018-03-07 | End: 2018-07-26

## 2018-03-07 RX ORDER — LISINOPRIL 20 MG/1
20 TABLET ORAL DAILY
Qty: 30 TABLET | Refills: 3 | Status: SHIPPED | OUTPATIENT
Start: 2018-03-07 | End: 2018-07-21 | Stop reason: SDUPTHER

## 2018-03-07 RX ORDER — AMLODIPINE BESYLATE 5 MG/1
5 TABLET ORAL DAILY
Qty: 30 TABLET | Refills: 3 | Status: SHIPPED | OUTPATIENT
Start: 2018-03-07 | End: 2018-06-26

## 2018-03-07 NOTE — PROGRESS NOTES
Subjective   Cristal Sams is a 89 y.o. female.     History of Present Illness   Current outpatient and discharge medications have been reconciled for the patient.  Patient was following up for pneumonia.  She developed influenza B pneumonia.  She was greatly improved but is lost 7 pounds over the past 3 weeks.  Patient was advised to get a diabetic boost .  Her lipid status is been controlled with her medication diet and exercise.  Her blood pressures been running 120s over 80s.  Her blood pressures been dropping into the 90s over 60s.  Patient did stop her lisinopril and amlodipine.  After 3 days her blood pressure was sitting back up to 150s over 90s.  Patient was advised to take 20 mg a last set up being daily and use amlodipine if the systolic was greater than 150 or diastolic greater than 110.  Patient will check her blood pressure daily return to our clinic in 2 weeks.    Much of this encounter note is an electronic transcription/translation of spoken language to printed text.  The electronic translation of spoken language may permit erroneous, or at times, nonsensical words or phrases to be inadvertently transcribed.  Although I have reviewed the note for such errors, some may still exist.  George Rock MD    The following portions of the patient's history were reviewed and updated as appropriate: allergies, current medications, past family history, past medical history, past social history, past surgical history and problem list.    Review of Systems   Constitutional: Positive for unexpected weight change. Negative for fatigue and fever.   HENT: Positive for congestion. Negative for trouble swallowing.    Eyes: Negative for discharge and visual disturbance.   Respiratory: Negative for choking and shortness of breath.    Cardiovascular: Negative for chest pain and palpitations.   Gastrointestinal: Negative for abdominal pain and blood in stool.   Endocrine: Negative.    Genitourinary: Negative for  genital sores and hematuria.   Musculoskeletal: Negative for gait problem and joint swelling.   Skin: Negative for color change, pallor, rash and wound.   Allergic/Immunologic: Positive for environmental allergies. Negative for immunocompromised state.   Neurological: Negative for facial asymmetry and speech difficulty.   Psychiatric/Behavioral: Negative for hallucinations and suicidal ideas.       Objective   Physical Exam   Constitutional: She is oriented to person, place, and time. She appears well-developed and well-nourished.   HENT:   Head: Normocephalic.   Eyes: Conjunctivae are normal. Pupils are equal, round, and reactive to light.   Neck: Normal range of motion. Neck supple.   Cardiovascular: Normal rate, regular rhythm and normal heart sounds.  Exam reveals no gallop and no friction rub.    No murmur heard.  Pulmonary/Chest: Effort normal and breath sounds normal. No respiratory distress. She has no wheezes. She has no rales. She exhibits no tenderness.   Abdominal: Soft. Bowel sounds are normal.   Musculoskeletal: Normal range of motion.   Neurological: She is alert and oriented to person, place, and time.   Skin: Skin is warm and dry.   Psychiatric: She has a normal mood and affect. Her behavior is normal. Judgment and thought content normal.   Nursing note and vitals reviewed.      Assessment/Plan   Problems Addressed this Visit        Cardiovascular and Mediastinum    Hyperlipemia    Relevant Medications    cyanocobalamin injection 1,000 mcg    Other Relevant Orders    CBC & Differential    Comprehensive Metabolic Panel    Lipid Panel    Vitamin B12 & Folate    Thyroid Panel With TSH    CBC Auto Differential    Essential hypertension    Relevant Medications    amLODIPine (NORVASC) 5 MG tablet    cyanocobalamin injection 1,000 mcg    lisinopril (PRINIVIL,ZESTRIL) 20 MG tablet    Other Relevant Orders    CBC & Differential    Comprehensive Metabolic Panel    Lipid Panel    Vitamin B12 & Folate     Thyroid Panel With TSH    CBC Auto Differential       Respiratory    Pneumonia - Primary    Relevant Medications    cyanocobalamin injection 1,000 mcg    Other Relevant Orders    CBC & Differential    Comprehensive Metabolic Panel    Lipid Panel    Vitamin B12 & Folate    Thyroid Panel With TSH    CBC Auto Differential      Other Visit Diagnoses     Low vitamin D level        Relevant Orders    Vitamin D 25 Hydroxy

## 2018-03-09 ENCOUNTER — LAB (OUTPATIENT)
Dept: LAB | Facility: HOSPITAL | Age: 83
End: 2018-03-09

## 2018-03-09 ENCOUNTER — OFFICE VISIT (OUTPATIENT)
Dept: ONCOLOGY | Facility: CLINIC | Age: 83
End: 2018-03-09

## 2018-03-09 VITALS
OXYGEN SATURATION: 98 % | SYSTOLIC BLOOD PRESSURE: 98 MMHG | WEIGHT: 93.6 LBS | HEART RATE: 61 BPM | DIASTOLIC BLOOD PRESSURE: 62 MMHG | BODY MASS INDEX: 18.87 KG/M2 | HEIGHT: 59 IN | RESPIRATION RATE: 14 BRPM | TEMPERATURE: 97.8 F

## 2018-03-09 DIAGNOSIS — C50.919 MALIGNANT NEOPLASM OF FEMALE BREAST, UNSPECIFIED ESTROGEN RECEPTOR STATUS, UNSPECIFIED LATERALITY, UNSPECIFIED SITE OF BREAST (HCC): Primary | ICD-10-CM

## 2018-03-09 DIAGNOSIS — M81.0 OSTEOPOROSIS, UNSPECIFIED OSTEOPOROSIS TYPE, UNSPECIFIED PATHOLOGICAL FRACTURE PRESENCE: ICD-10-CM

## 2018-03-09 DIAGNOSIS — C50.112 MALIGNANT NEOPLASM OF CENTRAL PORTION OF LEFT BREAST IN FEMALE, ESTROGEN RECEPTOR POSITIVE (HCC): ICD-10-CM

## 2018-03-09 DIAGNOSIS — Z17.0 MALIGNANT NEOPLASM OF BREAST IN FEMALE, ESTROGEN RECEPTOR POSITIVE, UNSPECIFIED LATERALITY, UNSPECIFIED SITE OF BREAST (HCC): ICD-10-CM

## 2018-03-09 DIAGNOSIS — Z17.0 MALIGNANT NEOPLASM OF CENTRAL PORTION OF LEFT BREAST IN FEMALE, ESTROGEN RECEPTOR POSITIVE (HCC): ICD-10-CM

## 2018-03-09 DIAGNOSIS — C50.919 MALIGNANT NEOPLASM OF BREAST IN FEMALE, ESTROGEN RECEPTOR POSITIVE, UNSPECIFIED LATERALITY, UNSPECIFIED SITE OF BREAST (HCC): ICD-10-CM

## 2018-03-09 LAB
DEPRECATED RDW RBC AUTO: 43.7 FL (ref 37–49)
ERYTHROCYTE [DISTWIDTH] IN BLOOD BY AUTOMATED COUNT: 12.7 % (ref 11.7–14.5)
HCT VFR BLD AUTO: 36 % (ref 34–45)
HGB BLD-MCNC: 11.7 G/DL (ref 11.5–14.9)
MCH RBC QN AUTO: 30.7 PG (ref 27–33)
MCHC RBC AUTO-ENTMCNC: 32.5 G/DL (ref 32–35)
MCV RBC AUTO: 94.5 FL (ref 83–97)
PLATELET # BLD AUTO: 416 10*3/MM3 (ref 150–375)
PMV BLD AUTO: 9.4 FL (ref 8.9–12.1)
RBC # BLD AUTO: 3.81 10*6/MM3 (ref 3.9–5)
WBC NRBC COR # BLD: 6.52 10*3/MM3 (ref 4–10)

## 2018-03-09 PROCEDURE — 99213 OFFICE O/P EST LOW 20 MIN: CPT | Performed by: INTERNAL MEDICINE

## 2018-03-09 PROCEDURE — 85027 COMPLETE CBC AUTOMATED: CPT

## 2018-03-09 NOTE — PROGRESS NOTES
Subjective .     REASONS FOR FOLLOWUP: 1.   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    2.  Status post left mastectomy with sentinel lymph node biopsy on December 7, 2017.  It is T2 N0 M0, stage IIA, ER positive VA positive HER-2/selwyn negative, grade 2, tumor size is 2.7×1.6 cm, invasive colloid carcinoma with 3 cm lymph nodes negative.  Focal ductal carcinoma in situ is present..    3.  CT scan shows  small nodules in the lung in the right middle and lower lobe and 2 low-density lesions in the liver which could be followed.    History of Present Illness   patient is a 89-year-old female with newly diagnosed left breast cancer.  She had noticed a left breast mass for several years.  More recently she found that it was getting bigger.  She also had complained of 43 pound weight loss over 3 years.  She underwent bilateral diagnostic mammogram on September 13, 2017.  She had left nipple retraction.  In the retroareolar region of the left breast at 12 o'clock position she had an irregular mass which was 3.1 cm.  No evidence of axillary adenopathy.  Right breast was negative.  On ultrasound she had a 2.5 cm mass.  No evidence of axillary adenopathy.  Biopsy was consistent with invasive mammary carcinoma with abundant mucus consistent with mucinous, colloid carcinoma.  It was intermediate grade.  Shaq score was 6 out of 9.    Her estrogen receptor was greater than 90%, progesterone receptor greater than 40% HER-2/selwyn was 2+ by IHC but negative by fish.  She was referred to Dr. Ponce for surgery.  Given her weight loss Dr. Ponce obtained CT scan of the chest abdomen pelvis as well as a bone scan.    CT scan showed the left breast mass which measured 27×16 mm.  No evidence of axillary or supple with total nadege enlargement.  Heart size is slightly enlarged.  There is no evidence for mediastinal or hilar nadege  enlargement.  Within the right middle lobe there is a 3 mm pulmonary nodule.  A 2 mm inferior right middle lobe nodule is also present in the right major fissure.  There is a 5 mm nodule within the anterior inferior medial right lower lobe.  There are additional smaller nodules within the right middle lobe.  There is an oblong obesity in the right lung base measuring 9×4 mm.  There is calcified granulomas in both lower lobes.  Within the left upper lobe there is a 2 mm nodule.  And a second nodule which is 5 mm.  Within the left manubrium there is a 5 mm sclerotic focus which needs to be followed.  CT of the abdomen pelvis shows 2 tiny nodules in the liver one is 5 mm and a second one is 7 mm.  But there was no definite evidence of metastatic disease in the chest abdomen or pelvis.    Bone scan showed mild focal increased uptake in the right anterior lateral ninth rib consistent with the healed fracture.  No bony evidence of metastases.    Patient underwent left mastectomy and left axillary sentinel lymph node biopsy on December 7, 2017.    Pathology shows invasive mammary carcinoma with mucinous features which is 2.7×2×1.6 cm in size.  It is grade 2, Shaq score of 6 out of 9.  It's a single focus of invasive carcinoma.  Ductal carcinoma in situ is focally present.  Which is grade 2 as well.  No evidence of lobular carcinoma in situ.  Margins are uninvolved by invasive carcinoma and by DCIS.  Closest margin from invasive carcinoma is less than 1 mm in the deep margin.  Distance of ductal carcinoma in situ from closest margin is 10 mm.  All additional margins are greater than 10 mm from invasive carcinoma and from DCIS.  Total of number of lymph nodes examined is 3 and none of them are involved.  It is a T2 N0 M0 invasive mammary carcinoma.    When she was seen here last discussed about hormonal therapy with either tamoxifen or Arimidex and patient refused.  She also has lung nodules that we wanted to obtain CT  scan and patient refuses.          Past Medical History:   Diagnosis Date   • Anemia    • Arthritis    • Breast cancer     LEFT   • Colon polyp    • Diabetes mellitus     DIET CONTROLLED   • GERD (gastroesophageal reflux disease)    • H/O Cataract    • Hiatal hernia    • History of hepatitis 1958   • History of kidney stone    • History of peptic ulcer    • History of vertigo    • Hyperlipidemia    • Hypertension    • Scoliosis    • Unexplained weight loss     #43 lb in 3 months    • Visual impairment        ONCOLOGIC HISTORY:  (History from previous dates can be found in the separate document.)  patient is a 89-year-old female who has history of hypertension and high cholesterol, history of peptic ulcer disease in the past on  Nexium, was found to have a mass in the left breast for more than a year.  She states that it was a small lesion which gradually increased in size.  She thought it was nothing.  She has a 96-year-old sister who lives with her and had some calcifications which on biopsy were negative.  She thought her lesion in the breast will resolve.  More recently she had pain in the left lower extremity coming from the back as a result she went to Dr. Rock.  He did plain x-rays of the lumbar spine and left knee and at the same time she told him that she had this breast mass.  She then underwent a mammogram.  A bilateral diagnostic mammogram was done.  This showed the left nipple was retracted.  In the retroareolar region of the left breast at the 12 o'clock position there was an irregular 3.1 cm mass.  Left breast skin thickening is also noted.  No evidence for axillary adenopathy is seen in either breast.  No suspicious findings were seen in the right breast.     Ultrasound of the left breast showed that at 12 o'clock position there was an irregular 2.5 cm heterogeneous mass.  No evidence of left axillary adenopathy was appreciated.  This was done on September 13, 2017.  Subsequently patient underwent  ultrasound-guided biopsy on 2017.  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  It is intermediate grade, [place] score is 6.  Invasive carcinoma involves multiple core biopsy fragments spanning at least 10 mm.  Estrogen receptors greater than 90%, progesterone receptor is 40%, HER-2/selwyn is 2+ by immunohistochemistry, HER-2 copy number is 2.2 with a HER-2 CEP ratio of 1.1, negative.  She has been referred here for further options of treatment.     Patient states that she has a cousin, who is her dad's twin brother's daughter who developed breast cancer at age 70.  Her mother had uterine cancer at age 87 and  from it.  She also thinks she has several cousins with breast cancer but they were older but she's unsure how old.     Patient is very active at home.  She cooks, does yard work, she removes weeds, trims  her shrubs.     She has vertigo when she turns towards the left.  This thought to be positional vertigo but she has not been evaluated by ENT.  She had left leg pain and back pain which is now resolved.     Patient had mild increase in calcium at 10.2.  She underwent a PTH level which was slightly elevated at 74.  She has been referred to endocrinology.  Her calcium today though is down to 9.8.  She also had a very low 25-hydroxy vitamin D level at 7.7 and has been placed on 50,000 units of vitamin D3 every weekly.  She has not been taking it since September  Patient underwent left mastectomy and left axillary sentinel lymph node biopsy on 2017.    Pathology shows invasive mammary carcinoma with mucinous features which is 2.7×2×1.6 cm in size.  It is grade 2, Shaq score of 6 out of 9.  It's a single focus of invasive carcinoma.  Ductal carcinoma in situ is focally present.  Which is grade 2 as well.  No evidence of lobular carcinoma in situ.  Margins are uninvolved by invasive carcinoma and by DCIS.   Closest margin from invasive carcinoma is less than 1 mm in the deep margin.  Distance of ductal carcinoma in situ from closest margin is 10 mm.  All additional margins are greater than 10 mm from invasive carcinoma and from DCIS.  Total of number of lymph nodes examined is 3 and none of them are involved.  It is a T2 N0 M0 invasive mammary carcinoma.    Patient not a candidate for chemotherapy given her age.  Also refused hormonal therapy.    Current Outpatient Prescriptions on File Prior to Visit   Medication Sig Dispense Refill   • amLODIPine (NORVASC) 5 MG tablet Take 1 tablet by mouth Daily. 30 tablet 3   • benzonatate (TESSALON) 100 MG capsule Take 1 capsule by mouth 3 (Three) Times a Day As Needed for Cough. 30 capsule 0   • Cholecalciferol (VITAMIN D3) 5000 units capsule capsule Take 1 capsule by mouth Daily. 30 capsule 6   • esomeprazole (nexIUM) 40 MG capsule Take 40 mg by mouth Every Morning Before Breakfast.     • glucose blood (ONE TOUCH ULTRA TEST) test strip Test Blood Sugar Once Daily 100 each 2   • lisinopril (PRINIVIL,ZESTRIL) 20 MG tablet Take 1 tablet by mouth Daily. 30 tablet 3   • ondansetron (ZOFRAN) 4 MG tablet Take 1 tablet by mouth Every 6 (Six) Hours As Needed for Nausea or Vomiting. 30 tablet 0   • rosuvastatin (CRESTOR) 5 MG tablet Take 5 mg by mouth Daily.     • Scopolamine (TRANSDERM-SCOP) 1.5 MG/3DAYS patch Place 1 patch on the skin Every 72 (Seventy-Two) Hours. 10 patch 0     Current Facility-Administered Medications on File Prior to Visit   Medication Dose Route Frequency Provider Last Rate Last Dose   • cyanocobalamin injection 1,000 mcg  1,000 mcg Intramuscular Q28 Days George Rock MD           ALLERGIES:     Allergies   Allergen Reactions   • Contrast Dye    • Novocain [Procaine] Palpitations and Parasthesia     LEG GET NUMB   • Aleve [Naproxen Sodium] GI Intolerance   • Cortizone-10 [Hydrocortisone] Other (See Comments)     UNSURE   • Eye Drops [Naphazoline-Polyethyl  Glycol] Other (See Comments)     UNSURE   • Lipitor [Atorvastatin] Other (See Comments)     UNSURE   • Sulfur Other (See Comments)     UNSURE   • Valium [Diazepam] Other (See Comments)     Doesn't like the way it makes her feel   • Zocor [Simvastatin] Other (See Comments)     UNSURE   • Zyrtec [Cetirizine] Other (See Comments)     UNSURE     OB/GYN history: Patient started menstrual period at age 14.  She had menopause at age 55.  She has not had any children and she has not had any miscarriages.     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  She retired at age 63 in .  She never smoked.  She never did alcohol.  She is .  She now lives with her 96-year-old sister.  She is very active.     Family history: Father  of an accident at age 56.  Mother  of uterine cancer at age 87.  She had a brother who  of lung cancer at age 61.  She has a brother with heart attack at age 46.  She lives with her sister was 96-year-old.  Her father's brother's daughter had breast cancer.  At age 70.  She states that several of her cousins had breast cancer.  But they were all older.         Cancer-related family history includes Lung cancer in her brother; Uterine cancer in her mother.     Review of Systems  A comprehensive 14 point review of systems was performed and was negative except as mentioned.    Objective      There were no vitals filed for this visit.  Current Status 2017   ECOG score 0       Physical Exam      GENERAL:  Well-developed, well-nourished in no acute distress.   SKIN:  Warm, dry without rashes, purpura or petechiae.  EYES:  Pupils equal, round and reactive to light.  EOMs intact.  Conjunctivae normal.  EARS:  Hearing intact.  NOSE:  Septum midline.  No excoriations or nasal discharge.  MOUTH:  Tongue is well-papillated; no stomatitis or ulcers.  Lips normal.  THROAT:   Oropharynx without lesions or exudates.  NECK:  Supple with good range of motion; no thyromegaly or masses, no JVD.  LYMPHATICS:  No cervical, supraclavicular, axillary or inguinal adenopathy.  CHEST:  Lungs clear to auscultation. Good airflow.  Breasts: She is status post left mastectomy.  The surgical scar is healed up.  There is no evidence of any left axillary adenopathy or left supraclavicular adenopathy.    Right breast: No evidence of any breast mass, no nipple retraction, no right axillary adenopathy, no right supraclavicular adenopathy, no skin changes.    CARDIAC:  Regular rate and rhythm without murmurs, rubs or gallops. Normal S1,S2.  ABDOMEN:  Soft, nontender with no hepatosplenomegaly or masses.  EXTREMITIES:  No clubbing, cyanosis or edema.  NEUROLOGICAL:  Cranial Nerves II-XII grossly intact.  No focal neurological deficits.  PSYCHIATRIC:  Normal affect and mood.        RECENT LABS:  Hematology WBC   Date Value Ref Range Status   03/07/2018 7.45 4.50 - 10.70 10*3/mm3 Final     RBC   Date Value Ref Range Status   03/07/2018 3.99 3.90 - 5.20 10*6/mm3 Final     Hemoglobin   Date Value Ref Range Status   03/07/2018 12.2 11.9 - 15.5 g/dL Final     Hematocrit   Date Value Ref Range Status   03/07/2018 38.4 35.6 - 45.5 % Final     Platelets   Date Value Ref Range Status   03/07/2018 487 140 - 500 10*3/mm3 Final        Assessment/Plan     1.  Left breast mass,T2 N0,radiologically, T3 N0 clinically,  patient noticed a mass in the left breast since a year and had been slowly growing.  She noted as she thought it'll resolve.  Subsequently more recently she underwent a bilateral diagnostic mammogram which showed nipple retraction on the left breast along with a 3.1 cm retroareolar mass with slight skin thickening.  There was no evidence of axillary adenopathy on the left side.  The right breast was normal.  She then underwent an ultrasound which showed a 2.5 cm mass is regular on the left retroareolar region.   It did not show evidence of any left axillary lymph node.  Patient underwent a biopsy of the breast mass by ultrasound guidance.  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  It was intermediate grade.  Shaq score is 6.  Invasive carcinoma was present in multiple core biopsy fragments spanning 10 mm.     It was estrogen receptor greater than 90%, progesterone receptor 40%, HER-2/slewyn 2+ by immunohistochemistry and  Is 2.2 with a HER-2/CEP ratio of 1.1 which is negative by fish. 2.  Mildly elevated calcium at 10.2 with elevated PTH, her repeat calcium is 9.8 today.  But she is being evaluated by endocrine.  Her hypercalcemia seems to be resolved.  · Status post left mastectomy with sentinel lymph node biopsy.  Pathology shows T2 N0 M0 invasive colloid carcinoma, grade 2 with focal ductal carcinoma in situ at surgery.  3 sentinel lymph nodes are negative.  She has good margins.  She does not require any radiation.  · Discussed in length about hormonal therapy as patient not a candidate for chemotherapy given her age and medical problems.  Patient is not keen to continue any hormonal preparations.  We discussed about both tamoxifen and Arimidex and their side effects in length.  Given her age certainly she could have osteoporosis and a DEXA scan is warranted.  However patient refuses tamoxifen or Arimidex.  Her niece is with her and she is in agreement with that.     3.  Family history of her cousin having had breast cancer at age 70.  No first-degree relative with breast cancer.  However she states several of her cousins have had breast cancer all when they were older.  Patient does not have any children of her own and does not want any genetic counseling.     4.  Severe vitamin D deficiency for which I encouraged her to continue vitamin D 50,000 units by mouth once weekly.    5.  CT scan showing lung nodules and 2 low-density lesions in the  liver which can be followed by CT scan in 6 months.  Refuses any further CT.    6.  Left chest wall fluid collection, appears significant.  There is no pain at the site.  I'm unsure if this requires drainage and will refer to Dr. Ponce.    Plan 1.  Patient not a candidate for chemotherapy.  ·       Patient refuses hormonal therapy with either tamoxifen or Arimidex.  Given her age I believe it is reasonable to observe.  We discussed about Evista as well but patient not a very keen.  · Consider repeat CT scan in 6 months to follow-up on the lung nodules if patient willing.  · Pt  refuses any treatment and refuses mammogram as causes her pain.  On exam she is no evidence of any mass in the breast.    MD Dr. Shanita Arora Dr.                     Cc:  George Rock MD

## 2018-03-29 ENCOUNTER — OFFICE VISIT (OUTPATIENT)
Dept: FAMILY MEDICINE CLINIC | Facility: CLINIC | Age: 83
End: 2018-03-29

## 2018-03-29 VITALS
HEART RATE: 55 BPM | BODY MASS INDEX: 19.56 KG/M2 | SYSTOLIC BLOOD PRESSURE: 104 MMHG | WEIGHT: 97 LBS | TEMPERATURE: 97.4 F | DIASTOLIC BLOOD PRESSURE: 62 MMHG | OXYGEN SATURATION: 97 % | RESPIRATION RATE: 12 BRPM | HEIGHT: 59 IN

## 2018-03-29 DIAGNOSIS — E55.9 VITAMIN D DEFICIENCY: ICD-10-CM

## 2018-03-29 DIAGNOSIS — E78.2 MIXED HYPERLIPIDEMIA: ICD-10-CM

## 2018-03-29 DIAGNOSIS — E53.8 LOW VITAMIN B12 LEVEL: ICD-10-CM

## 2018-03-29 DIAGNOSIS — E11.9 TYPE 2 DIABETES MELLITUS WITHOUT COMPLICATION, WITHOUT LONG-TERM CURRENT USE OF INSULIN (HCC): ICD-10-CM

## 2018-03-29 DIAGNOSIS — I10 ESSENTIAL HYPERTENSION: Primary | ICD-10-CM

## 2018-03-29 PROCEDURE — 96372 THER/PROPH/DIAG INJ SC/IM: CPT | Performed by: INTERNAL MEDICINE

## 2018-03-29 PROCEDURE — 99214 OFFICE O/P EST MOD 30 MIN: CPT | Performed by: INTERNAL MEDICINE

## 2018-03-29 RX ORDER — CYANOCOBALAMIN 1000 UG/ML
1000 INJECTION, SOLUTION INTRAMUSCULAR; SUBCUTANEOUS
Status: DISCONTINUED | OUTPATIENT
Start: 2018-03-29 | End: 2018-07-26

## 2018-03-29 RX ADMIN — CYANOCOBALAMIN 1000 MCG: 1000 INJECTION, SOLUTION INTRAMUSCULAR; SUBCUTANEOUS at 15:00

## 2018-03-29 NOTE — PROGRESS NOTES
Subjective   Cristal Sams is a 89 y.o. female.     History of Present Illness   Patient was seen for hypertension.  Pressures been running 110s over 80s.  Her sugars have been running in the 120s.  Her lipid status been well-controlled with diet and exercise and medication.  She does have low B12 was given a B12 injection.  She is having her labs drawn and will follow-up in 3 months.    Dictated utilizing Dragon dictation. If there are questions or for further clarification, please contact me.   The following portions of the patient's history were reviewed and updated as appropriate: allergies, current medications, past family history, past medical history, past social history, past surgical history and problem list.    Review of Systems   Constitutional: Negative for fatigue and fever.   HENT: Positive for congestion. Negative for trouble swallowing.    Eyes: Negative for discharge and visual disturbance.   Respiratory: Negative for choking and shortness of breath.    Cardiovascular: Negative for chest pain and palpitations.   Gastrointestinal: Negative for abdominal pain and blood in stool.   Endocrine: Negative.    Genitourinary: Negative for genital sores and hematuria.   Musculoskeletal: Negative for gait problem and joint swelling.   Skin: Negative for color change, pallor, rash and wound.   Allergic/Immunologic: Positive for environmental allergies. Negative for immunocompromised state.   Neurological: Negative for facial asymmetry and speech difficulty.   Psychiatric/Behavioral: Negative for hallucinations and suicidal ideas.       Objective   Physical Exam   Constitutional: She is oriented to person, place, and time. She appears well-developed and well-nourished.   HENT:   Head: Normocephalic.   Eyes: Conjunctivae are normal. Pupils are equal, round, and reactive to light.   Neck: Normal range of motion. Neck supple.   Cardiovascular: Normal rate, regular rhythm and normal heart sounds.     Pulmonary/Chest: Effort normal and breath sounds normal.   Abdominal: Soft. Bowel sounds are normal.   Musculoskeletal: Normal range of motion.   Neurological: She is alert and oriented to person, place, and time.   Skin: Skin is warm and dry.   Psychiatric: She has a normal mood and affect. Her behavior is normal. Judgment and thought content normal.   Nursing note and vitals reviewed.      Assessment/Plan   Problems Addressed this Visit        Cardiovascular and Mediastinum    Hyperlipemia    Relevant Orders    Comprehensive Metabolic Panel    CBC & Differential    Hemoglobin A1c    Lipid Panel    Vitamin D 25 Hydroxy    Essential hypertension - Primary    Relevant Orders    Comprehensive Metabolic Panel    CBC & Differential    Hemoglobin A1c    Lipid Panel    Vitamin D 25 Hydroxy       Endocrine    Type 2 diabetes mellitus without complication    Relevant Orders    Comprehensive Metabolic Panel    CBC & Differential    Hemoglobin A1c    Lipid Panel    Vitamin D 25 Hydroxy      Other Visit Diagnoses     Low vitamin B12 level        Relevant Medications    cyanocobalamin injection 1,000 mcg    Other Relevant Orders    Comprehensive Metabolic Panel    CBC & Differential    Hemoglobin A1c    Lipid Panel    Vitamin D 25 Hydroxy    Vitamin D deficiency         Relevant Orders    Vitamin D 25 Hydroxy

## 2018-04-26 RX ORDER — ROSUVASTATIN CALCIUM 5 MG/1
TABLET, COATED ORAL
Qty: 30 TABLET | Refills: 0 | Status: SHIPPED | OUTPATIENT
Start: 2018-04-26 | End: 2018-06-19 | Stop reason: SDUPTHER

## 2018-06-19 RX ORDER — ROSUVASTATIN CALCIUM 5 MG/1
TABLET, COATED ORAL
Qty: 30 TABLET | Refills: 0 | Status: SHIPPED | OUTPATIENT
Start: 2018-06-19 | End: 2018-06-26

## 2018-06-21 ENCOUNTER — TELEPHONE (OUTPATIENT)
Dept: SURGERY | Facility: CLINIC | Age: 83
End: 2018-06-21

## 2018-06-21 ENCOUNTER — HOSPITAL ENCOUNTER (EMERGENCY)
Facility: HOSPITAL | Age: 83
Discharge: HOME | End: 2018-06-21
Attending: EMERGENCY MEDICINE | Admitting: EMERGENCY MEDICINE

## 2018-06-21 ENCOUNTER — APPOINTMENT (OUTPATIENT)
Dept: CT IMAGING | Facility: HOSPITAL | Age: 83
End: 2018-06-21

## 2018-06-21 ENCOUNTER — TELEPHONE (OUTPATIENT)
Dept: FAMILY MEDICINE CLINIC | Facility: CLINIC | Age: 83
End: 2018-06-21

## 2018-06-21 VITALS
SYSTOLIC BLOOD PRESSURE: 148 MMHG | BODY MASS INDEX: 20.99 KG/M2 | HEART RATE: 50 BPM | HEIGHT: 58 IN | OXYGEN SATURATION: 96 % | TEMPERATURE: 98.1 F | DIASTOLIC BLOOD PRESSURE: 58 MMHG | RESPIRATION RATE: 16 BRPM | WEIGHT: 100 LBS

## 2018-06-21 DIAGNOSIS — K45.8 OTHER SPECIFIED ABDOMINAL HERNIA WITHOUT OBSTRUCTION OR GANGRENE: Primary | ICD-10-CM

## 2018-06-21 LAB
ALBUMIN SERPL-MCNC: 4.8 G/DL (ref 3.5–5.2)
ALBUMIN/GLOB SERPL: 1.8 G/DL
ALP SERPL-CCNC: 74 U/L (ref 39–117)
ALT SERPL W P-5'-P-CCNC: 16 U/L (ref 1–33)
ANION GAP SERPL CALCULATED.3IONS-SCNC: 11.3 MMOL/L
AST SERPL-CCNC: 16 U/L (ref 1–32)
BACTERIA UR QL AUTO: NORMAL /HPF
BASOPHILS # BLD AUTO: 0.01 10*3/MM3 (ref 0–0.2)
BASOPHILS NFR BLD AUTO: 0.2 % (ref 0–1.5)
BILIRUB SERPL-MCNC: 0.4 MG/DL (ref 0.1–1.2)
BILIRUB UR QL STRIP: NEGATIVE
BUN BLD-MCNC: 9 MG/DL (ref 8–23)
BUN/CREAT SERPL: 14.8 (ref 7–25)
CALCIUM SPEC-SCNC: 10.1 MG/DL (ref 8.2–9.6)
CHLORIDE SERPL-SCNC: 98 MMOL/L (ref 98–107)
CLARITY UR: CLEAR
CO2 SERPL-SCNC: 28.7 MMOL/L (ref 22–29)
COLOR UR: YELLOW
CREAT BLD-MCNC: 0.61 MG/DL (ref 0.57–1)
DEPRECATED RDW RBC AUTO: 45.2 FL (ref 37–54)
EOSINOPHIL # BLD AUTO: 0.04 10*3/MM3 (ref 0–0.7)
EOSINOPHIL NFR BLD AUTO: 0.7 % (ref 0.3–6.2)
ERYTHROCYTE [DISTWIDTH] IN BLOOD BY AUTOMATED COUNT: 13.2 % (ref 11.7–13)
GFR SERPL CREATININE-BSD FRML MDRD: 92 ML/MIN/1.73
GLOBULIN UR ELPH-MCNC: 2.6 GM/DL
GLUCOSE BLD-MCNC: 100 MG/DL (ref 65–99)
GLUCOSE UR STRIP-MCNC: NEGATIVE MG/DL
HCT VFR BLD AUTO: 40 % (ref 35.6–45.5)
HGB BLD-MCNC: 13.1 G/DL (ref 11.9–15.5)
HGB UR QL STRIP.AUTO: NEGATIVE
HYALINE CASTS UR QL AUTO: NORMAL /LPF
IMM GRANULOCYTES # BLD: 0 10*3/MM3 (ref 0–0.03)
IMM GRANULOCYTES NFR BLD: 0 % (ref 0–0.5)
KETONES UR QL STRIP: NEGATIVE
LEUKOCYTE ESTERASE UR QL STRIP.AUTO: ABNORMAL
LYMPHOCYTES # BLD AUTO: 1.7 10*3/MM3 (ref 0.9–4.8)
LYMPHOCYTES NFR BLD AUTO: 29.4 % (ref 19.6–45.3)
MCH RBC QN AUTO: 30.5 PG (ref 26.9–32)
MCHC RBC AUTO-ENTMCNC: 32.8 G/DL (ref 32.4–36.3)
MCV RBC AUTO: 93 FL (ref 80.5–98.2)
MONOCYTES # BLD AUTO: 0.5 10*3/MM3 (ref 0.2–1.2)
MONOCYTES NFR BLD AUTO: 8.7 % (ref 5–12)
NEUTROPHILS # BLD AUTO: 3.53 10*3/MM3 (ref 1.9–8.1)
NEUTROPHILS NFR BLD AUTO: 61 % (ref 42.7–76)
NITRITE UR QL STRIP: NEGATIVE
PH UR STRIP.AUTO: 7.5 [PH] (ref 5–8)
PLATELET # BLD AUTO: 346 10*3/MM3 (ref 140–500)
PMV BLD AUTO: 10.7 FL (ref 6–12)
POTASSIUM BLD-SCNC: 4.2 MMOL/L (ref 3.5–5.2)
PROT SERPL-MCNC: 7.4 G/DL (ref 6–8.5)
PROT UR QL STRIP: NEGATIVE
RBC # BLD AUTO: 4.3 10*6/MM3 (ref 3.9–5.2)
RBC # UR: NORMAL /HPF
REF LAB TEST METHOD: NORMAL
SODIUM BLD-SCNC: 138 MMOL/L (ref 136–145)
SP GR UR STRIP: 1.01 (ref 1–1.03)
SQUAMOUS #/AREA URNS HPF: NORMAL /HPF
UROBILINOGEN UR QL STRIP: ABNORMAL
WBC NRBC COR # BLD: 5.78 10*3/MM3 (ref 4.5–10.7)
WBC UR QL AUTO: NORMAL /HPF

## 2018-06-21 PROCEDURE — 25010000002 MORPHINE PER 10 MG: Performed by: EMERGENCY MEDICINE

## 2018-06-21 PROCEDURE — 96375 TX/PRO/DX INJ NEW DRUG ADDON: CPT

## 2018-06-21 PROCEDURE — 85025 COMPLETE CBC W/AUTO DIFF WBC: CPT | Performed by: NURSE PRACTITIONER

## 2018-06-21 PROCEDURE — 96374 THER/PROPH/DIAG INJ IV PUSH: CPT

## 2018-06-21 PROCEDURE — 81001 URINALYSIS AUTO W/SCOPE: CPT | Performed by: NURSE PRACTITIONER

## 2018-06-21 PROCEDURE — 99284 EMERGENCY DEPT VISIT MOD MDM: CPT

## 2018-06-21 PROCEDURE — 25010000002 ONDANSETRON PER 1 MG: Performed by: EMERGENCY MEDICINE

## 2018-06-21 PROCEDURE — 80053 COMPREHEN METABOLIC PANEL: CPT | Performed by: NURSE PRACTITIONER

## 2018-06-21 PROCEDURE — 74176 CT ABD & PELVIS W/O CONTRAST: CPT

## 2018-06-21 RX ORDER — POLYETHYLENE GLYCOL 3350 17 G/17G
17 POWDER, FOR SOLUTION ORAL DAILY
Qty: 10 EACH | Refills: 0 | Status: SHIPPED | OUTPATIENT
Start: 2018-06-21 | End: 2019-01-01

## 2018-06-21 RX ORDER — SODIUM CHLORIDE 0.9 % (FLUSH) 0.9 %
10 SYRINGE (ML) INJECTION AS NEEDED
Status: DISCONTINUED | OUTPATIENT
Start: 2018-06-21 | End: 2018-06-21 | Stop reason: HOSPADM

## 2018-06-21 RX ORDER — ONDANSETRON 2 MG/ML
4 INJECTION INTRAMUSCULAR; INTRAVENOUS ONCE
Status: COMPLETED | OUTPATIENT
Start: 2018-06-21 | End: 2018-06-21

## 2018-06-21 RX ADMIN — ONDANSETRON 4 MG: 2 INJECTION INTRAMUSCULAR; INTRAVENOUS at 10:47

## 2018-06-21 RX ADMIN — MORPHINE SULFATE 4 MG: 4 INJECTION INTRAVENOUS at 10:51

## 2018-06-21 NOTE — TELEPHONE ENCOUNTER
PT'S NIECE CALLED STATING THAT SHE IS HAVING SEVERE SIDE PAIN, YESTERDAY AND ALL LAST NIGHT. SHE STATES THAT THEY ARE TAKING PT TO THE EMERGENCY ROOM AND WANTED TO KNOW IF DR. HERMAN COULD CALL AND GET HER SEEN SOONER IN THE HOSPITAL?

## 2018-06-21 NOTE — TELEPHONE ENCOUNTER
Patient has to cancel appt for tomorrow, she was in ED today and has a hernia that is need of repair. She requested to be rescheduled;    7/24/18 1:00 with Dr. Ponce for follow up. lml

## 2018-06-22 ENCOUNTER — OFFICE VISIT (OUTPATIENT)
Dept: SURGERY | Facility: CLINIC | Age: 83
End: 2018-06-22

## 2018-06-22 VITALS — WEIGHT: 99 LBS | OXYGEN SATURATION: 95 % | HEIGHT: 59 IN | BODY MASS INDEX: 19.96 KG/M2 | HEART RATE: 61 BPM

## 2018-06-22 DIAGNOSIS — K40.00 NON-RECURRENT BILATERAL INGUINAL HERNIA WITH OBSTRUCTION WITHOUT GANGRENE: Primary | ICD-10-CM

## 2018-06-22 PROCEDURE — 99214 OFFICE O/P EST MOD 30 MIN: CPT | Performed by: SURGERY

## 2018-06-22 RX ORDER — CEFAZOLIN SODIUM 2 G/100ML
2 INJECTION, SOLUTION INTRAVENOUS ONCE
Status: CANCELLED | OUTPATIENT
Start: 2018-06-28 | End: 2018-06-28

## 2018-06-25 RX ORDER — ERGOCALCIFEROL 1.25 MG/1
50000 CAPSULE ORAL WEEKLY
COMMUNITY
End: 2018-06-26

## 2018-06-26 ENCOUNTER — APPOINTMENT (OUTPATIENT)
Dept: PREADMISSION TESTING | Facility: HOSPITAL | Age: 83
End: 2018-06-26

## 2018-06-26 VITALS
BODY MASS INDEX: 21.2 KG/M2 | TEMPERATURE: 97.2 F | OXYGEN SATURATION: 100 % | SYSTOLIC BLOOD PRESSURE: 149 MMHG | RESPIRATION RATE: 18 BRPM | DIASTOLIC BLOOD PRESSURE: 67 MMHG | WEIGHT: 101 LBS | HEART RATE: 54 BPM | HEIGHT: 58 IN

## 2018-06-26 RX ORDER — ROSUVASTATIN CALCIUM 5 MG/1
5 TABLET, COATED ORAL DAILY
Status: ON HOLD | COMMUNITY
End: 2019-01-01

## 2018-06-26 RX ORDER — AMLODIPINE BESYLATE 5 MG/1
5 TABLET ORAL DAILY PRN
COMMUNITY

## 2018-06-26 RX ORDER — BENZONATATE 100 MG/1
100 CAPSULE ORAL 3 TIMES DAILY PRN
COMMUNITY
End: 2018-11-23

## 2018-06-28 ENCOUNTER — ANESTHESIA (OUTPATIENT)
Dept: PERIOP | Facility: HOSPITAL | Age: 83
End: 2018-06-28

## 2018-06-28 ENCOUNTER — HOSPITAL ENCOUNTER (OUTPATIENT)
Facility: HOSPITAL | Age: 83
Discharge: HOME HEALTH | End: 2018-07-01
Attending: SURGERY | Admitting: SURGERY

## 2018-06-28 ENCOUNTER — ANESTHESIA EVENT (OUTPATIENT)
Dept: PERIOP | Facility: HOSPITAL | Age: 83
End: 2018-06-28

## 2018-06-28 DIAGNOSIS — K40.00 NON-RECURRENT BILATERAL INGUINAL HERNIA WITH OBSTRUCTION WITHOUT GANGRENE: ICD-10-CM

## 2018-06-28 LAB — GLUCOSE BLDC GLUCOMTR-MCNC: 142 MG/DL (ref 70–130)

## 2018-06-28 PROCEDURE — 25010000002 FENTANYL CITRATE (PF) 100 MCG/2ML SOLUTION: Performed by: NURSE ANESTHETIST, CERTIFIED REGISTERED

## 2018-06-28 PROCEDURE — 63710000001 LISINOPRIL 20 MG TABLET: Performed by: SURGERY

## 2018-06-28 PROCEDURE — 25010000002 ONDANSETRON PER 1 MG: Performed by: SURGERY

## 2018-06-28 PROCEDURE — C1781 MESH (IMPLANTABLE): HCPCS | Performed by: SURGERY

## 2018-06-28 PROCEDURE — 63710000001 POLYETHYLENE GLYCOL PACK: Performed by: SURGERY

## 2018-06-28 PROCEDURE — 25010000002 HYDRALAZINE PER 20 MG: Performed by: NURSE ANESTHETIST, CERTIFIED REGISTERED

## 2018-06-28 PROCEDURE — A9270 NON-COVERED ITEM OR SERVICE: HCPCS | Performed by: SURGERY

## 2018-06-28 PROCEDURE — G0378 HOSPITAL OBSERVATION PER HR: HCPCS

## 2018-06-28 PROCEDURE — 63710000001 ROSUVASTATIN 5 MG TABLET: Performed by: SURGERY

## 2018-06-28 PROCEDURE — 25010000003 CEFAZOLIN IN DEXTROSE 2-4 GM/100ML-% SOLUTION: Performed by: SURGERY

## 2018-06-28 PROCEDURE — 25010000002 PROPOFOL 10 MG/ML EMULSION: Performed by: NURSE ANESTHETIST, CERTIFIED REGISTERED

## 2018-06-28 PROCEDURE — 49650 LAP ING HERNIA REPAIR INIT: CPT | Performed by: PHYSICIAN ASSISTANT

## 2018-06-28 PROCEDURE — 82962 GLUCOSE BLOOD TEST: CPT

## 2018-06-28 PROCEDURE — 63710000001 HYDROCODONE-ACETAMINOPHEN 5-325 MG TABLET: Performed by: SURGERY

## 2018-06-28 PROCEDURE — 49650 LAP ING HERNIA REPAIR INIT: CPT | Performed by: SURGERY

## 2018-06-28 DEVICE — PROLITE MESH, 6" X 6" (15CM X 15CM)
Type: IMPLANTABLE DEVICE | Site: ABDOMEN | Status: FUNCTIONAL
Brand: PROLITE

## 2018-06-28 RX ORDER — PROMETHAZINE HYDROCHLORIDE 25 MG/1
25 TABLET ORAL ONCE AS NEEDED
Status: DISCONTINUED | OUTPATIENT
Start: 2018-06-28 | End: 2018-06-28 | Stop reason: HOSPADM

## 2018-06-28 RX ORDER — PROMETHAZINE HYDROCHLORIDE 25 MG/ML
12.5 INJECTION, SOLUTION INTRAMUSCULAR; INTRAVENOUS ONCE AS NEEDED
Status: DISCONTINUED | OUTPATIENT
Start: 2018-06-28 | End: 2018-06-28 | Stop reason: HOSPADM

## 2018-06-28 RX ORDER — LIDOCAINE HYDROCHLORIDE 20 MG/ML
INJECTION, SOLUTION INFILTRATION; PERINEURAL AS NEEDED
Status: DISCONTINUED | OUTPATIENT
Start: 2018-06-28 | End: 2018-06-28 | Stop reason: SURG

## 2018-06-28 RX ORDER — HYDRALAZINE HYDROCHLORIDE 20 MG/ML
5 INJECTION INTRAMUSCULAR; INTRAVENOUS
Status: DISCONTINUED | OUTPATIENT
Start: 2018-06-28 | End: 2018-06-28 | Stop reason: HOSPADM

## 2018-06-28 RX ORDER — HYDROCODONE BITARTRATE AND ACETAMINOPHEN 7.5; 325 MG/1; MG/1
1 TABLET ORAL ONCE AS NEEDED
Status: COMPLETED | OUTPATIENT
Start: 2018-06-28 | End: 2018-06-28

## 2018-06-28 RX ORDER — FENTANYL CITRATE 50 UG/ML
50 INJECTION, SOLUTION INTRAMUSCULAR; INTRAVENOUS
Status: DISCONTINUED | OUTPATIENT
Start: 2018-06-28 | End: 2018-06-28 | Stop reason: HOSPADM

## 2018-06-28 RX ORDER — PROPOFOL 10 MG/ML
VIAL (ML) INTRAVENOUS AS NEEDED
Status: DISCONTINUED | OUTPATIENT
Start: 2018-06-28 | End: 2018-06-28 | Stop reason: SURG

## 2018-06-28 RX ORDER — ONDANSETRON 2 MG/ML
4 INJECTION INTRAMUSCULAR; INTRAVENOUS ONCE AS NEEDED
Status: DISCONTINUED | OUTPATIENT
Start: 2018-06-28 | End: 2018-06-28 | Stop reason: HOSPADM

## 2018-06-28 RX ORDER — HYDROCODONE BITARTRATE AND ACETAMINOPHEN 5; 325 MG/1; MG/1
1 TABLET ORAL EVERY 8 HOURS PRN
Qty: 15 TABLET | Refills: 0 | Status: SHIPPED | OUTPATIENT
Start: 2018-06-28 | End: 2018-07-18

## 2018-06-28 RX ORDER — SODIUM CHLORIDE 9 MG/ML
100 INJECTION, SOLUTION INTRAVENOUS CONTINUOUS
Status: DISCONTINUED | OUTPATIENT
Start: 2018-06-28 | End: 2018-06-30

## 2018-06-28 RX ORDER — ONDANSETRON 2 MG/ML
4 INJECTION INTRAMUSCULAR; INTRAVENOUS EVERY 6 HOURS PRN
Status: DISCONTINUED | OUTPATIENT
Start: 2018-06-28 | End: 2018-07-01 | Stop reason: HOSPADM

## 2018-06-28 RX ORDER — ROSUVASTATIN CALCIUM 5 MG/1
5 TABLET, COATED ORAL DAILY
Status: DISCONTINUED | OUTPATIENT
Start: 2018-06-28 | End: 2018-07-01 | Stop reason: HOSPADM

## 2018-06-28 RX ORDER — ONDANSETRON 4 MG/1
4 TABLET, FILM COATED ORAL EVERY 6 HOURS PRN
Status: DISCONTINUED | OUTPATIENT
Start: 2018-06-28 | End: 2018-07-01 | Stop reason: HOSPADM

## 2018-06-28 RX ORDER — OXYCODONE AND ACETAMINOPHEN 7.5; 325 MG/1; MG/1
1 TABLET ORAL ONCE AS NEEDED
Status: DISCONTINUED | OUTPATIENT
Start: 2018-06-28 | End: 2018-06-28 | Stop reason: HOSPADM

## 2018-06-28 RX ORDER — LABETALOL HYDROCHLORIDE 5 MG/ML
5 INJECTION, SOLUTION INTRAVENOUS
Status: DISCONTINUED | OUTPATIENT
Start: 2018-06-28 | End: 2018-06-28 | Stop reason: HOSPADM

## 2018-06-28 RX ORDER — FENTANYL CITRATE 50 UG/ML
INJECTION, SOLUTION INTRAMUSCULAR; INTRAVENOUS AS NEEDED
Status: DISCONTINUED | OUTPATIENT
Start: 2018-06-28 | End: 2018-06-28 | Stop reason: SURG

## 2018-06-28 RX ORDER — NALOXONE HCL 0.4 MG/ML
0.1 VIAL (ML) INJECTION
Status: DISCONTINUED | OUTPATIENT
Start: 2018-06-28 | End: 2018-07-01 | Stop reason: HOSPADM

## 2018-06-28 RX ORDER — SODIUM CHLORIDE, SODIUM LACTATE, POTASSIUM CHLORIDE, CALCIUM CHLORIDE 600; 310; 30; 20 MG/100ML; MG/100ML; MG/100ML; MG/100ML
9 INJECTION, SOLUTION INTRAVENOUS CONTINUOUS
Status: DISCONTINUED | OUTPATIENT
Start: 2018-06-28 | End: 2018-06-28

## 2018-06-28 RX ORDER — NALOXONE HCL 0.4 MG/ML
0.2 VIAL (ML) INJECTION AS NEEDED
Status: DISCONTINUED | OUTPATIENT
Start: 2018-06-28 | End: 2018-06-28 | Stop reason: HOSPADM

## 2018-06-28 RX ORDER — AMLODIPINE BESYLATE 5 MG/1
5 TABLET ORAL DAILY PRN
Status: DISCONTINUED | OUTPATIENT
Start: 2018-06-28 | End: 2018-07-01 | Stop reason: HOSPADM

## 2018-06-28 RX ORDER — ROCURONIUM BROMIDE 10 MG/ML
INJECTION, SOLUTION INTRAVENOUS AS NEEDED
Status: DISCONTINUED | OUTPATIENT
Start: 2018-06-28 | End: 2018-06-28 | Stop reason: SURG

## 2018-06-28 RX ORDER — MIDAZOLAM HYDROCHLORIDE 1 MG/ML
2 INJECTION INTRAMUSCULAR; INTRAVENOUS
Status: DISCONTINUED | OUTPATIENT
Start: 2018-06-28 | End: 2018-06-28 | Stop reason: HOSPADM

## 2018-06-28 RX ORDER — HYDROCODONE BITARTRATE AND ACETAMINOPHEN 5; 325 MG/1; MG/1
1 TABLET ORAL EVERY 4 HOURS PRN
Status: DISCONTINUED | OUTPATIENT
Start: 2018-06-28 | End: 2018-06-29

## 2018-06-28 RX ORDER — ONDANSETRON 2 MG/ML
4 INJECTION INTRAMUSCULAR; INTRAVENOUS EVERY 4 HOURS PRN
Status: DISCONTINUED | OUTPATIENT
Start: 2018-06-28 | End: 2018-07-01 | Stop reason: HOSPADM

## 2018-06-28 RX ORDER — PROMETHAZINE HYDROCHLORIDE 25 MG/1
25 SUPPOSITORY RECTAL ONCE AS NEEDED
Status: DISCONTINUED | OUTPATIENT
Start: 2018-06-28 | End: 2018-06-28 | Stop reason: HOSPADM

## 2018-06-28 RX ORDER — LIDOCAINE HYDROCHLORIDE 10 MG/ML
0.5 INJECTION, SOLUTION EPIDURAL; INFILTRATION; INTRACAUDAL; PERINEURAL ONCE AS NEEDED
Status: DISCONTINUED | OUTPATIENT
Start: 2018-06-28 | End: 2018-06-28 | Stop reason: HOSPADM

## 2018-06-28 RX ORDER — PROMETHAZINE HYDROCHLORIDE 25 MG/1
12.5 TABLET ORAL ONCE AS NEEDED
Status: DISCONTINUED | OUTPATIENT
Start: 2018-06-28 | End: 2018-06-28 | Stop reason: HOSPADM

## 2018-06-28 RX ORDER — SODIUM CHLORIDE 0.9 % (FLUSH) 0.9 %
1-10 SYRINGE (ML) INJECTION AS NEEDED
Status: DISCONTINUED | OUTPATIENT
Start: 2018-06-28 | End: 2018-06-28 | Stop reason: HOSPADM

## 2018-06-28 RX ORDER — MIDAZOLAM HYDROCHLORIDE 1 MG/ML
1 INJECTION INTRAMUSCULAR; INTRAVENOUS
Status: DISCONTINUED | OUTPATIENT
Start: 2018-06-28 | End: 2018-06-28 | Stop reason: HOSPADM

## 2018-06-28 RX ORDER — FAMOTIDINE 10 MG/ML
20 INJECTION, SOLUTION INTRAVENOUS ONCE
Status: COMPLETED | OUTPATIENT
Start: 2018-06-28 | End: 2018-06-28

## 2018-06-28 RX ORDER — BENZONATATE 100 MG/1
100 CAPSULE ORAL 3 TIMES DAILY PRN
Status: DISCONTINUED | OUTPATIENT
Start: 2018-06-28 | End: 2018-07-01 | Stop reason: HOSPADM

## 2018-06-28 RX ORDER — GLYCOPYRROLATE 0.2 MG/ML
INJECTION INTRAMUSCULAR; INTRAVENOUS AS NEEDED
Status: DISCONTINUED | OUTPATIENT
Start: 2018-06-28 | End: 2018-06-28 | Stop reason: SURG

## 2018-06-28 RX ORDER — POLYETHYLENE GLYCOL 3350 17 G/17G
17 POWDER, FOR SOLUTION ORAL DAILY
Status: DISCONTINUED | OUTPATIENT
Start: 2018-06-28 | End: 2018-07-01 | Stop reason: HOSPADM

## 2018-06-28 RX ORDER — PANTOPRAZOLE SODIUM 40 MG/1
40 TABLET, DELAYED RELEASE ORAL EVERY MORNING
Status: DISCONTINUED | OUTPATIENT
Start: 2018-06-29 | End: 2018-07-01 | Stop reason: HOSPADM

## 2018-06-28 RX ORDER — LISINOPRIL 20 MG/1
20 TABLET ORAL DAILY
Status: DISCONTINUED | OUTPATIENT
Start: 2018-06-28 | End: 2018-07-01 | Stop reason: HOSPADM

## 2018-06-28 RX ORDER — DIPHENHYDRAMINE HYDROCHLORIDE 50 MG/ML
12.5 INJECTION INTRAMUSCULAR; INTRAVENOUS
Status: DISCONTINUED | OUTPATIENT
Start: 2018-06-28 | End: 2018-06-28 | Stop reason: HOSPADM

## 2018-06-28 RX ORDER — ONDANSETRON 4 MG/1
4 TABLET, ORALLY DISINTEGRATING ORAL EVERY 6 HOURS PRN
Status: DISCONTINUED | OUTPATIENT
Start: 2018-06-28 | End: 2018-07-01 | Stop reason: HOSPADM

## 2018-06-28 RX ORDER — BUPIVACAINE HYDROCHLORIDE AND EPINEPHRINE 5; 5 MG/ML; UG/ML
INJECTION, SOLUTION PERINEURAL AS NEEDED
Status: DISCONTINUED | OUTPATIENT
Start: 2018-06-28 | End: 2018-06-28 | Stop reason: HOSPADM

## 2018-06-28 RX ORDER — MAGNESIUM HYDROXIDE 1200 MG/15ML
LIQUID ORAL AS NEEDED
Status: DISCONTINUED | OUTPATIENT
Start: 2018-06-28 | End: 2018-06-28 | Stop reason: HOSPADM

## 2018-06-28 RX ORDER — CEFAZOLIN SODIUM 2 G/100ML
2 INJECTION, SOLUTION INTRAVENOUS ONCE
Status: COMPLETED | OUTPATIENT
Start: 2018-06-28 | End: 2018-06-28

## 2018-06-28 RX ADMIN — GLYCOPYRROLATE 0.2 MG: 0.2 INJECTION INTRAMUSCULAR; INTRAVENOUS at 10:37

## 2018-06-28 RX ADMIN — ROCURONIUM BROMIDE 10 MG: 10 INJECTION INTRAVENOUS at 11:32

## 2018-06-28 RX ADMIN — FENTANYL CITRATE 50 MCG: 50 INJECTION, SOLUTION INTRAMUSCULAR; INTRAVENOUS at 12:46

## 2018-06-28 RX ADMIN — HYDRALAZINE HYDROCHLORIDE 5 MG: 20 INJECTION INTRAMUSCULAR; INTRAVENOUS at 13:14

## 2018-06-28 RX ADMIN — SODIUM CHLORIDE, POTASSIUM CHLORIDE, SODIUM LACTATE AND CALCIUM CHLORIDE: 600; 310; 30; 20 INJECTION, SOLUTION INTRAVENOUS at 12:22

## 2018-06-28 RX ADMIN — ROCURONIUM BROMIDE 30 MG: 10 INJECTION INTRAVENOUS at 10:41

## 2018-06-28 RX ADMIN — SODIUM CHLORIDE, POTASSIUM CHLORIDE, SODIUM LACTATE AND CALCIUM CHLORIDE: 600; 310; 30; 20 INJECTION, SOLUTION INTRAVENOUS at 10:33

## 2018-06-28 RX ADMIN — ROSUVASTATIN CALCIUM 5 MG: 5 TABLET, FILM COATED ORAL at 20:24

## 2018-06-28 RX ADMIN — ONDANSETRON 4 MG: 2 INJECTION INTRAMUSCULAR; INTRAVENOUS at 19:10

## 2018-06-28 RX ADMIN — SODIUM CHLORIDE, POTASSIUM CHLORIDE, SODIUM LACTATE AND CALCIUM CHLORIDE 9 ML/HR: 600; 310; 30; 20 INJECTION, SOLUTION INTRAVENOUS at 09:47

## 2018-06-28 RX ADMIN — SUGAMMADEX 100 MG: 100 INJECTION, SOLUTION INTRAVENOUS at 12:23

## 2018-06-28 RX ADMIN — LISINOPRIL 20 MG: 20 TABLET ORAL at 20:24

## 2018-06-28 RX ADMIN — LIDOCAINE HYDROCHLORIDE 40 MG: 20 INJECTION, SOLUTION INFILTRATION; PERINEURAL at 10:41

## 2018-06-28 RX ADMIN — HYDROCODONE BITARTRATE AND ACETAMINOPHEN 1 TABLET: 5; 325 TABLET ORAL at 20:22

## 2018-06-28 RX ADMIN — FENTANYL CITRATE 50 MCG: 50 INJECTION, SOLUTION INTRAMUSCULAR; INTRAVENOUS at 12:58

## 2018-06-28 RX ADMIN — PROPOFOL 100 MG: 10 INJECTION, EMULSION INTRAVENOUS at 10:41

## 2018-06-28 RX ADMIN — SODIUM CHLORIDE 100 ML/HR: 9 INJECTION, SOLUTION INTRAVENOUS at 21:14

## 2018-06-28 RX ADMIN — CEFAZOLIN SODIUM 2 G: 2 INJECTION, SOLUTION INTRAVENOUS at 10:43

## 2018-06-28 RX ADMIN — FENTANYL CITRATE 25 MCG: 50 INJECTION INTRAMUSCULAR; INTRAVENOUS at 10:46

## 2018-06-28 RX ADMIN — HYDROCODONE BITARTRATE AND ACETAMINOPHEN 1 TABLET: 7.5; 325 TABLET ORAL at 13:57

## 2018-06-28 RX ADMIN — FENTANYL CITRATE 50 MCG: 50 INJECTION, SOLUTION INTRAMUSCULAR; INTRAVENOUS at 15:02

## 2018-06-28 RX ADMIN — FENTANYL CITRATE 25 MCG: 50 INJECTION INTRAMUSCULAR; INTRAVENOUS at 11:13

## 2018-06-28 RX ADMIN — FENTANYL CITRATE 25 MCG: 50 INJECTION INTRAMUSCULAR; INTRAVENOUS at 11:18

## 2018-06-28 RX ADMIN — FAMOTIDINE 20 MG: 10 INJECTION, SOLUTION INTRAVENOUS at 09:47

## 2018-06-28 RX ADMIN — FENTANYL CITRATE 25 MCG: 50 INJECTION INTRAMUSCULAR; INTRAVENOUS at 10:59

## 2018-06-28 RX ADMIN — POLYETHYLENE GLYCOL 3350 17 G: 17 POWDER, FOR SOLUTION ORAL at 20:23

## 2018-06-28 NOTE — ANESTHESIA POSTPROCEDURE EVALUATION
"Patient: Cristal Sams    Procedure Summary     Date:  06/28/18 Room / Location:  [facility] OR  / [facility] MAIN OR    Anesthesia Start:  1033 Anesthesia Stop:  1239    Procedure:  INGUINAL HERNIA REPAIR LAPAROSCOPIC (Bilateral Abdomen) Diagnosis:       Non-recurrent bilateral inguinal hernia with obstruction without gangrene      (Non-recurrent bilateral inguinal hernia with obstruction without gangrene [K40.00])    Surgeon:  George Phelps MD Provider:  Jennifer Husain MD    Anesthesia Type:  general ASA Status:  3          Anesthesia Type: general  Last vitals  BP   129/60 (06/28/18 1443)   Temp   36.3 °C (97.4 °F) (06/28/18 1240)   Pulse   77 (06/28/18 1443)   Resp   16 (06/28/18 1443)     SpO2   96 % (06/28/18 1443)     Post Anesthesia Care and Evaluation    Patient location during evaluation: PHASE II  Patient participation: complete - patient participated  Level of consciousness: sleepy but conscious  Pain score: 0  Pain management: adequate  Airway patency: patent  Anesthetic complications: No anesthetic complications    Cardiovascular status: acceptable  Respiratory status: acceptable  Hydration status: acceptable    Comments: Blood pressure 129/60, pulse 77, temperature 36.3 °C (97.4 °F), temperature source Oral, resp. rate 16, height 147.3 cm (58\"), weight 46.2 kg (101 lb 13.6 oz), SpO2 96 %.    No anesthesia care post op    "

## 2018-06-28 NOTE — ANESTHESIA PROCEDURE NOTES
Airway  Urgency: elective    Airway not difficult    General Information and Staff    Patient location during procedure: OR  Anesthesiologist: VIVIANA ALEXANDRE  CRNA: RONNIE IZQUIERDO    Indications and Patient Condition  Indications for airway management: airway protection    Preoxygenated: yes  Mask difficulty assessment: 1 - vent by mask    Final Airway Details  Final airway type: endotracheal airway      Successful airway: ETT  Cuffed: yes   Successful intubation technique: direct laryngoscopy  Endotracheal tube insertion site: oral  Blade: Kenia  Blade size: #3  ETT size: 7.0 mm  Cormack-Lehane Classification: grade I - full view of glottis  Placement verified by: chest auscultation and capnometry   Measured from: teeth  ETT to teeth (cm): 19  Number of attempts at approach: 1    Additional Comments  Atraumatic. Dentition as preop.

## 2018-06-28 NOTE — PROGRESS NOTES
CHIEF COMPLAINT:    Inguinal hernia    HISTORY OF PRESENT ILLNESS:    Cristal Sams is a 89 y.o. female who presented to the ED yesterday with right lower quadrant abdominal pain that was progressing in intensity for about 36 hours. It began while she was bearing down to have a bowel movement. She had an inguinal hernia that was reduced in the ED. The pain went away. She is here to discuss repair.     She had a noticeable lump in the groin for many years. It grew substantially following a colonoscopy, but it was never the source of pain until yesterday. There has been no nausea or vomiting. There are no urinary symptoms.    Past Medical History:   Diagnosis Date   • Anemia    • Arthritis    • Breast cancer     LEFT   • Colon polyp 04/13/2012    HEPATIC FLEXURE: Tubular Adenoma   • Colon polyps 05/23/2017    RT COLON POLYP: Hyperplastic; SIMOID POLYP: Tubular Adenoma   • Diabetes mellitus     DIET CONTROLLED   • Diverticulosis    • GERD (gastroesophageal reflux disease)    • H/O Cataract    • Hiatal hernia    • History of diverticulitis    • History of hepatitis 1958   • History of kidney stone    • History of peptic ulcer    • History of vertigo    • Hyperlipidemia    • Hypertension    • Inguinal hernia    • Migraine    • Rheumatoid arthritis    • Sciatic leg pain    • Scoliosis    • Unexplained weight loss     #43 lb in 3 months    • Visual impairment        Past Surgical History:   Procedure Laterality Date   • BREAST BIOPSY Left 2017   • CATARACT EXTRACTION Bilateral 2008   • COLONOSCOPY N/A 04/12/2012    HEPATIC FLEXURE: Tubular Adenoma, Vicente Monroe   • COLONOSCOPY W/ POLYPECTOMY N/A 05/23/2017    CLS 7mm RT colon polyp Hot snare 2 cm polyp on stalk, Removed w/ hot snare, Vicente Monroe   • DILATATION AND CURETTAGE     • MASTECTOMY WITH SENTINEL NODE BIOPSY AND AXILLARY NODE DISSECTION Left 12/7/2017    Procedure:  left total mastectomy, left axillary sentinel lymph node biopsy x 3 ;   Surgeon: Madison Ponce MD;  Location: [facility] OR [facility] [place];  Service:    • SINUS SURGERY      CAUTERIZE A BLEED       No current facility-administered medications for this visit.   No current outpatient prescriptions on file.    Facility-Administered Medications Ordered in Other Visits:   •  ceFAZolin in dextrose (ANCEF) IVPB solution 2 g, 2 g, Intravenous, Once, George Phelps MD  •  fentaNYL citrate (PF) (SUBLIMAZE) injection 50 mcg, 50 mcg, Intravenous, Q10 Min PRN, Abhijit Sahni MD  •  lactated ringers infusion, 9 mL/hr, Intravenous, Continuous, Abhijit Sahni MD, Last Rate: 9 mL/hr at 06/28/18 0947, 9 mL/hr at 06/28/18 0947  •  lidocaine PF 1% (XYLOCAINE) injection 0.5 mL, 0.5 mL, Injection, Once PRN, Abhijit Sahni MD  •  midazolam (VERSED) injection 1 mg, 1 mg, Intravenous, Q5 Min PRN **OR** midazolam (VERSED) injection 2 mg, 2 mg, Intravenous, Q5 Min PRN, Abhijit Sahni MD  •  sodium chloride 0.9 % flush 1-10 mL, 1-10 mL, Intravenous, PRN, Abhijit Sahni MD    Allergies   Allergen Reactions   • Contrast Dye Other (See Comments)     PASS OUT   • Novocain [Procaine] Palpitations and Paresthesia     LEG GET NUMB   • Aleve [Naproxen Sodium] GI Intolerance   • Cortizone-10 [Hydrocortisone] Other (See Comments)     UNSURE   • Eye Drops [Naphazoline-Polyethyl Glycol] Other (See Comments)     UNSURE   • Lipitor [Atorvastatin] Other (See Comments)     UNSURE   • Sulfur Other (See Comments)     UNSURE   • Valium [Diazepam] Other (See Comments)     Doesn't like the way it makes her feel   • Zocor [Simvastatin] Other (See Comments)     UNSURE   • Zyrtec [Cetirizine] Other (See Comments)     UNSURE       Family History   Problem Relation Age of Onset   • Uterine cancer Mother    • Coronary artery disease Mother    • Lung cancer Brother    • Heart attack Brother    • Heart disease Brother    • Coronary artery disease Sister    • Malig Hyperthermia Neg Hx        Social History  "    Social History   • Marital status:      Spouse name: N/A   • Number of children: N/A   • Years of education: 6 months college     Occupational History   •  Retired     [employer]     Social History Main Topics   • Smoking status: Never Smoker   • Smokeless tobacco: Never Used   • Alcohol use No   • Drug use: No   • Sexual activity: Defer     Other Topics Concern   • Not on file     Social History Narrative   • No narrative on file       Review of Systems   Gastrointestinal: Positive for abdominal pain and constipation.        Hernia   All other systems reviewed and are negative.      Objective     Pulse 61   Ht 148.6 cm (58.5\")   Wt 44.9 kg (99 lb)   LMP  (LMP Unknown)   SpO2 95%   BMI 20.34 kg/m²     Physical Exam   Constitutional: She is oriented to person, place, and time. She appears well-developed and well-nourished. No distress.   HENT:   Head: Normocephalic and atraumatic.   Right Ear: External ear normal.   Left Ear: External ear normal.   Eyes: Conjunctivae are normal. No scleral icterus.   Neck: Neck supple. No JVD present. No tracheal deviation present.   Cardiovascular: Normal rate, regular rhythm and normal heart sounds.  Exam reveals no friction rub.    No murmur heard.  Pulmonary/Chest: Effort normal and breath sounds normal. No respiratory distress. She has no wheezes.   Abdominal: Soft. Bowel sounds are normal. She exhibits no distension and no mass. There is no tenderness. There is no rebound and no guarding. A hernia (bilateral reducible inguinal hernias) is present.   Musculoskeletal: She exhibits no edema or deformity.   Lymphadenopathy:     She has no cervical adenopathy.   Neurological: She is alert and oriented to person, place, and time.   Skin: Skin is warm and dry. Capillary refill takes less than 2 seconds.   Psychiatric: She has a normal mood and affect. Her behavior is normal.       DIAGNOSTIC DATA:    WBC 5.78, Hgb 13.1, Plts 346  Creat 0.61, CO2 28.7    I " reviewed the images and the report of the CT scan of the abdomen and pelvis performed on 6/21/2018. It shows bilateral inguinal hernias. There is diverticulosis, but no inflammation.      ASSESSMENT:    Bilateral inguinal hernias  Diabetes  Osteoarthritis    PLAN:    I think the hernia should be repaired. I think otherwise, she could expect other incarceration events that might no be so easily resolved. We discussed laparoscopic repair, and she is interested in scheduling surgery.

## 2018-06-28 NOTE — ANESTHESIA PREPROCEDURE EVALUATION
Anesthesia Evaluation     Patient summary reviewed and Nursing notes reviewed   NPO Solid Status: > 8 hours  NPO Liquid Status: > 8 hours           Airway   Mallampati: II  Neck ROM: full  No difficulty expected  Dental      Comment: Partial upper    Pulmonary     breath sounds clear to auscultation  (+) pneumonia ,   Cardiovascular     Rhythm: regular    (+) hypertension, hyperlipidemia,       Neuro/Psych  (+) headaches, syncope, numbness,     GI/Hepatic/Renal/Endo    (+)  hiatal hernia, GERD,  diabetes mellitus,     Musculoskeletal     Abdominal    Substance History      OB/GYN          Other   (+) arthritis   history of cancer                    Anesthesia Plan    ASA 3     general     intravenous induction   Anesthetic plan and risks discussed with patient.

## 2018-06-29 ENCOUNTER — TELEPHONE (OUTPATIENT)
Dept: FAMILY MEDICINE CLINIC | Facility: CLINIC | Age: 83
End: 2018-06-29

## 2018-06-29 LAB — GLUCOSE BLDC GLUCOMTR-MCNC: 146 MG/DL (ref 70–130)

## 2018-06-29 PROCEDURE — A9270 NON-COVERED ITEM OR SERVICE: HCPCS | Performed by: SURGERY

## 2018-06-29 PROCEDURE — 99024 POSTOP FOLLOW-UP VISIT: CPT | Performed by: SURGERY

## 2018-06-29 PROCEDURE — 63710000001 ROSUVASTATIN 5 MG TABLET: Performed by: SURGERY

## 2018-06-29 PROCEDURE — 63710000001 LISINOPRIL 20 MG TABLET: Performed by: SURGERY

## 2018-06-29 PROCEDURE — 25010000002 ONDANSETRON PER 1 MG: Performed by: SURGERY

## 2018-06-29 PROCEDURE — 63710000001 PANTOPRAZOLE 40 MG TABLET DELAYED-RELEASE: Performed by: SURGERY

## 2018-06-29 PROCEDURE — 82962 GLUCOSE BLOOD TEST: CPT

## 2018-06-29 PROCEDURE — 63710000001 HYDROCODONE-ACETAMINOPHEN 5-325 MG TABLET: Performed by: SURGERY

## 2018-06-29 PROCEDURE — 25010000002 HYDROMORPHONE PER 4 MG: Performed by: SURGERY

## 2018-06-29 PROCEDURE — 63710000001 POLYETHYLENE GLYCOL PACK: Performed by: SURGERY

## 2018-06-29 PROCEDURE — G0378 HOSPITAL OBSERVATION PER HR: HCPCS

## 2018-06-29 RX ORDER — HYDROCODONE BITARTRATE AND ACETAMINOPHEN 5; 325 MG/1; MG/1
1 TABLET ORAL EVERY 4 HOURS PRN
Status: DISCONTINUED | OUTPATIENT
Start: 2018-06-29 | End: 2018-07-01 | Stop reason: HOSPADM

## 2018-06-29 RX ORDER — HYDROCODONE BITARTRATE AND ACETAMINOPHEN 5; 325 MG/1; MG/1
2 TABLET ORAL EVERY 4 HOURS PRN
Status: DISCONTINUED | OUTPATIENT
Start: 2018-06-29 | End: 2018-07-01 | Stop reason: HOSPADM

## 2018-06-29 RX ADMIN — SODIUM CHLORIDE 100 ML/HR: 9 INJECTION, SOLUTION INTRAVENOUS at 08:30

## 2018-06-29 RX ADMIN — SODIUM CHLORIDE 100 ML/HR: 9 INJECTION, SOLUTION INTRAVENOUS at 19:42

## 2018-06-29 RX ADMIN — ONDANSETRON 4 MG: 2 INJECTION INTRAMUSCULAR; INTRAVENOUS at 16:24

## 2018-06-29 RX ADMIN — HYDROCODONE BITARTRATE AND ACETAMINOPHEN 1 TABLET: 5; 325 TABLET ORAL at 07:27

## 2018-06-29 RX ADMIN — ROSUVASTATIN CALCIUM 5 MG: 5 TABLET, FILM COATED ORAL at 08:44

## 2018-06-29 RX ADMIN — HYDROCODONE BITARTATE AND ACETAMINOPHEN 2 TABLET: 5; 325 TABLET ORAL at 11:32

## 2018-06-29 RX ADMIN — POLYETHYLENE GLYCOL 3350 17 G: 17 POWDER, FOR SOLUTION ORAL at 08:43

## 2018-06-29 RX ADMIN — LISINOPRIL 20 MG: 20 TABLET ORAL at 08:44

## 2018-06-29 RX ADMIN — PANTOPRAZOLE SODIUM 40 MG: 40 TABLET, DELAYED RELEASE ORAL at 06:51

## 2018-06-29 RX ADMIN — HYDROMORPHONE HYDROCHLORIDE 0.25 MG: 1 INJECTION, SOLUTION INTRAMUSCULAR; INTRAVENOUS; SUBCUTANEOUS at 14:26

## 2018-06-30 LAB
GLUCOSE BLDC GLUCOMTR-MCNC: 100 MG/DL (ref 70–130)
GLUCOSE BLDC GLUCOMTR-MCNC: 99 MG/DL (ref 70–130)

## 2018-06-30 PROCEDURE — 63710000001 PANTOPRAZOLE 40 MG TABLET DELAYED-RELEASE: Performed by: SURGERY

## 2018-06-30 PROCEDURE — 63710000001 HYDROCODONE-ACETAMINOPHEN 5-325 MG TABLET: Performed by: SURGERY

## 2018-06-30 PROCEDURE — G0378 HOSPITAL OBSERVATION PER HR: HCPCS

## 2018-06-30 PROCEDURE — 63710000001 POLYETHYLENE GLYCOL PACK: Performed by: SURGERY

## 2018-06-30 PROCEDURE — G8979 MOBILITY GOAL STATUS: HCPCS

## 2018-06-30 PROCEDURE — 82962 GLUCOSE BLOOD TEST: CPT

## 2018-06-30 PROCEDURE — 63710000001 LISINOPRIL 20 MG TABLET: Performed by: SURGERY

## 2018-06-30 PROCEDURE — 99024 POSTOP FOLLOW-UP VISIT: CPT | Performed by: SURGERY

## 2018-06-30 PROCEDURE — G8978 MOBILITY CURRENT STATUS: HCPCS

## 2018-06-30 PROCEDURE — G8980 MOBILITY D/C STATUS: HCPCS

## 2018-06-30 PROCEDURE — A9270 NON-COVERED ITEM OR SERVICE: HCPCS | Performed by: SURGERY

## 2018-06-30 PROCEDURE — 97161 PT EVAL LOW COMPLEX 20 MIN: CPT

## 2018-06-30 PROCEDURE — 63710000001 ROSUVASTATIN 5 MG TABLET: Performed by: SURGERY

## 2018-06-30 RX ADMIN — LISINOPRIL 20 MG: 20 TABLET ORAL at 09:18

## 2018-06-30 RX ADMIN — POLYETHYLENE GLYCOL 3350 17 G: 17 POWDER, FOR SOLUTION ORAL at 09:18

## 2018-06-30 RX ADMIN — SODIUM CHLORIDE 100 ML/HR: 9 INJECTION, SOLUTION INTRAVENOUS at 05:46

## 2018-06-30 RX ADMIN — ROSUVASTATIN CALCIUM 5 MG: 5 TABLET, FILM COATED ORAL at 09:18

## 2018-06-30 RX ADMIN — HYDROCODONE BITARTRATE AND ACETAMINOPHEN 1 TABLET: 5; 325 TABLET ORAL at 15:00

## 2018-06-30 RX ADMIN — PANTOPRAZOLE SODIUM 40 MG: 40 TABLET, DELAYED RELEASE ORAL at 06:30

## 2018-07-01 VITALS
TEMPERATURE: 98.6 F | HEIGHT: 58 IN | HEART RATE: 69 BPM | OXYGEN SATURATION: 95 % | BODY MASS INDEX: 21.38 KG/M2 | DIASTOLIC BLOOD PRESSURE: 69 MMHG | WEIGHT: 101.85 LBS | RESPIRATION RATE: 16 BRPM | SYSTOLIC BLOOD PRESSURE: 151 MMHG

## 2018-07-01 PROCEDURE — 63710000001 PANTOPRAZOLE 40 MG TABLET DELAYED-RELEASE: Performed by: SURGERY

## 2018-07-01 PROCEDURE — A9270 NON-COVERED ITEM OR SERVICE: HCPCS | Performed by: SURGERY

## 2018-07-01 PROCEDURE — 63710000001 LISINOPRIL 20 MG TABLET: Performed by: SURGERY

## 2018-07-01 PROCEDURE — 99024 POSTOP FOLLOW-UP VISIT: CPT | Performed by: SURGERY

## 2018-07-01 PROCEDURE — 63710000001 ROSUVASTATIN 5 MG TABLET: Performed by: SURGERY

## 2018-07-01 PROCEDURE — G0378 HOSPITAL OBSERVATION PER HR: HCPCS

## 2018-07-01 PROCEDURE — 63710000001 POLYETHYLENE GLYCOL PACK: Performed by: SURGERY

## 2018-07-01 RX ADMIN — PANTOPRAZOLE SODIUM 40 MG: 40 TABLET, DELAYED RELEASE ORAL at 06:29

## 2018-07-01 RX ADMIN — ROSUVASTATIN CALCIUM 5 MG: 5 TABLET, FILM COATED ORAL at 09:54

## 2018-07-01 RX ADMIN — POLYETHYLENE GLYCOL 3350 17 G: 17 POWDER, FOR SOLUTION ORAL at 09:55

## 2018-07-01 RX ADMIN — LISINOPRIL 20 MG: 20 TABLET ORAL at 09:54

## 2018-07-03 ENCOUNTER — TELEPHONE (OUTPATIENT)
Dept: FAMILY MEDICINE CLINIC | Facility: CLINIC | Age: 83
End: 2018-07-03

## 2018-07-12 ENCOUNTER — TELEPHONE (OUTPATIENT)
Dept: SURGERY | Facility: CLINIC | Age: 83
End: 2018-07-12

## 2018-07-12 NOTE — TELEPHONE ENCOUNTER
Home health nurse Catarina just wanted you to be aware that pt was saying she was having right groin pain. Feels it when reaching and has discomfort. Catarina did check and the site is not red or swollen and not painful to touch. She just wanted you to be aware before her follow up with you next week

## 2018-07-18 ENCOUNTER — OFFICE VISIT (OUTPATIENT)
Dept: SURGERY | Facility: CLINIC | Age: 83
End: 2018-07-18

## 2018-07-18 DIAGNOSIS — K40.00 NON-RECURRENT BILATERAL INGUINAL HERNIA WITH OBSTRUCTION WITHOUT GANGRENE: Primary | ICD-10-CM

## 2018-07-18 PROCEDURE — 99024 POSTOP FOLLOW-UP VISIT: CPT | Performed by: SURGERY

## 2018-07-18 NOTE — PROGRESS NOTES
CHIEF COMPLAINT:    Follow up from bilateral inguinal hernia repair    HISTORY OF PRESENT ILLNESS:    Cristal Sams is a 89 y.o. female who underwent bilateral laparoscopic hernia repair on 6/28/2018. She has done well since surgery. She has been eating her usual diet. There is minimal pain. She wants to know when she an drive again.    EXAM:    Alert. Oriented.  Lungs: Clear.  Heart: Regular.  Abdomen: soft. Not distended. Incisions okay. Hernias repaired.  Extremities: Warm.    ASSESSMENT:    S/P laparoscopic bilateral inguinal hernia repair on 6/28/2018    PLAN:    Return to usual activity.  Continue usual diet.  Okay to drive again.

## 2018-07-19 ENCOUNTER — LAB (OUTPATIENT)
Dept: FAMILY MEDICINE CLINIC | Facility: CLINIC | Age: 83
End: 2018-07-19

## 2018-07-19 ENCOUNTER — CLINICAL SUPPORT (OUTPATIENT)
Dept: FAMILY MEDICINE CLINIC | Facility: CLINIC | Age: 83
End: 2018-07-19

## 2018-07-19 DIAGNOSIS — E78.2 MIXED HYPERLIPIDEMIA: ICD-10-CM

## 2018-07-19 DIAGNOSIS — E11.9 TYPE 2 DIABETES MELLITUS WITHOUT COMPLICATION, WITHOUT LONG-TERM CURRENT USE OF INSULIN (HCC): ICD-10-CM

## 2018-07-19 DIAGNOSIS — I10 ESSENTIAL HYPERTENSION: ICD-10-CM

## 2018-07-19 DIAGNOSIS — E53.8 LOW VITAMIN B12 LEVEL: ICD-10-CM

## 2018-07-19 DIAGNOSIS — E55.9 VITAMIN D DEFICIENCY: ICD-10-CM

## 2018-07-19 LAB
25(OH)D3 SERPL-MCNC: 71.7 NG/ML (ref 30–100)
ALBUMIN SERPL-MCNC: 4.5 G/DL (ref 3.5–5.2)
ALBUMIN/GLOB SERPL: 1.6 G/DL
ALP SERPL-CCNC: 63 U/L (ref 39–117)
ALT SERPL W P-5'-P-CCNC: 16 U/L (ref 1–33)
ANION GAP SERPL CALCULATED.3IONS-SCNC: 13.2 MMOL/L
AST SERPL-CCNC: 16 U/L (ref 1–32)
BILIRUB SERPL-MCNC: 0.5 MG/DL (ref 0.1–1.2)
BUN BLD-MCNC: 9 MG/DL (ref 8–23)
BUN/CREAT SERPL: 12.7 (ref 7–25)
CALCIUM SPEC-SCNC: 9.8 MG/DL (ref 8.2–9.6)
CHLORIDE SERPL-SCNC: 98 MMOL/L (ref 98–107)
CHOLEST SERPL-MCNC: 170 MG/DL (ref 0–200)
CO2 SERPL-SCNC: 27.8 MMOL/L (ref 22–29)
CREAT BLD-MCNC: 0.71 MG/DL (ref 0.57–1)
ERYTHROCYTE [DISTWIDTH] IN BLOOD BY AUTOMATED COUNT: 14.1 % (ref 4.5–15)
GFR SERPL CREATININE-BSD FRML MDRD: 77 ML/MIN/1.73
GLOBULIN UR ELPH-MCNC: 2.8 GM/DL
GLUCOSE BLD-MCNC: 86 MG/DL (ref 65–99)
HBA1C MFR BLD: 5.1 % (ref 4.8–5.6)
HCT VFR BLD AUTO: 36.7 % (ref 31–42)
HDLC SERPL-MCNC: 75 MG/DL (ref 40–60)
HGB BLD-MCNC: 12.5 G/DL (ref 12–18)
LDLC SERPL CALC-MCNC: 76 MG/DL (ref 0–100)
LDLC/HDLC SERPL: 1.02 {RATIO}
LYMPHOCYTES # BLD AUTO: 2.2 10*3/MM3 (ref 1.2–3.4)
LYMPHOCYTES NFR BLD AUTO: 32.2 % (ref 21–51)
MCH RBC QN AUTO: 30.4 PG (ref 26.1–33.1)
MCHC RBC AUTO-ENTMCNC: 34 G/DL (ref 33–37)
MCV RBC AUTO: 89.5 FL (ref 80–99)
MONOCYTES # BLD AUTO: 0.4 10*3/MM3 (ref 0.1–0.6)
MONOCYTES NFR BLD AUTO: 5.8 % (ref 2–9)
NEUTROPHILS # BLD AUTO: 4.2 10*3/MM3 (ref 1.4–6.5)
NEUTROPHILS NFR BLD AUTO: 62 % (ref 42–75)
PLATELET # BLD AUTO: 389 10*3/MM3 (ref 150–450)
PMV BLD AUTO: 7.5 FL (ref 7.1–10.5)
POTASSIUM BLD-SCNC: 4.7 MMOL/L (ref 3.5–5.2)
PROT SERPL-MCNC: 7.3 G/DL (ref 6–8.5)
RBC # BLD AUTO: 4.1 10*6/MM3 (ref 4–6)
SODIUM BLD-SCNC: 139 MMOL/L (ref 136–145)
TRIGL SERPL-MCNC: 93 MG/DL (ref 0–150)
VLDLC SERPL-MCNC: 18.6 MG/DL (ref 5–40)
WBC NRBC COR # BLD: 6.7 10*3/MM3 (ref 4.5–10)

## 2018-07-19 PROCEDURE — 80061 LIPID PANEL: CPT | Performed by: INTERNAL MEDICINE

## 2018-07-19 PROCEDURE — 96372 THER/PROPH/DIAG INJ SC/IM: CPT | Performed by: INTERNAL MEDICINE

## 2018-07-19 PROCEDURE — 85025 COMPLETE CBC W/AUTO DIFF WBC: CPT | Performed by: INTERNAL MEDICINE

## 2018-07-19 PROCEDURE — 82306 VITAMIN D 25 HYDROXY: CPT | Performed by: INTERNAL MEDICINE

## 2018-07-19 PROCEDURE — 80053 COMPREHEN METABOLIC PANEL: CPT | Performed by: INTERNAL MEDICINE

## 2018-07-19 PROCEDURE — 83036 HEMOGLOBIN GLYCOSYLATED A1C: CPT | Performed by: INTERNAL MEDICINE

## 2018-07-19 PROCEDURE — 36415 COLL VENOUS BLD VENIPUNCTURE: CPT | Performed by: INTERNAL MEDICINE

## 2018-07-19 RX ADMIN — CYANOCOBALAMIN 1000 MCG: 1000 INJECTION, SOLUTION INTRAMUSCULAR; SUBCUTANEOUS at 13:16

## 2018-07-23 RX ORDER — LISINOPRIL 20 MG/1
TABLET ORAL
Qty: 30 TABLET | Refills: 2 | Status: SHIPPED | OUTPATIENT
Start: 2018-07-23 | End: 2018-10-26 | Stop reason: SDUPTHER

## 2018-07-24 ENCOUNTER — OFFICE VISIT (OUTPATIENT)
Dept: SURGERY | Facility: CLINIC | Age: 83
End: 2018-07-24

## 2018-07-24 VITALS
HEART RATE: 62 BPM | SYSTOLIC BLOOD PRESSURE: 175 MMHG | BODY MASS INDEX: 16.7 KG/M2 | HEIGHT: 64 IN | DIASTOLIC BLOOD PRESSURE: 77 MMHG | OXYGEN SATURATION: 99 % | WEIGHT: 97.8 LBS

## 2018-07-24 DIAGNOSIS — C50.112 MALIGNANT NEOPLASM OF CENTRAL PORTION OF LEFT BREAST IN FEMALE, ESTROGEN RECEPTOR POSITIVE (HCC): Primary | ICD-10-CM

## 2018-07-24 DIAGNOSIS — Z17.0 MALIGNANT NEOPLASM OF CENTRAL PORTION OF LEFT BREAST IN FEMALE, ESTROGEN RECEPTOR POSITIVE (HCC): Primary | ICD-10-CM

## 2018-07-24 DIAGNOSIS — Z12.31 ENCOUNTER FOR SCREENING MAMMOGRAM FOR MALIGNANT NEOPLASM OF BREAST: ICD-10-CM

## 2018-07-24 PROCEDURE — 99213 OFFICE O/P EST LOW 20 MIN: CPT | Performed by: SURGERY
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
## 2018-07-26 ENCOUNTER — OFFICE VISIT (OUTPATIENT)
Dept: FAMILY MEDICINE CLINIC | Facility: CLINIC | Age: 83
End: 2018-07-26

## 2018-07-26 VITALS
HEART RATE: 56 BPM | TEMPERATURE: 98.2 F | DIASTOLIC BLOOD PRESSURE: 80 MMHG | OXYGEN SATURATION: 98 % | WEIGHT: 98 LBS | RESPIRATION RATE: 14 BRPM | SYSTOLIC BLOOD PRESSURE: 150 MMHG | HEIGHT: 64 IN | BODY MASS INDEX: 16.73 KG/M2

## 2018-07-26 DIAGNOSIS — K21.00 GASTROESOPHAGEAL REFLUX DISEASE WITH ESOPHAGITIS: ICD-10-CM

## 2018-07-26 DIAGNOSIS — K43.9 HERNIA OF ANTERIOR ABDOMINAL WALL: Primary | ICD-10-CM

## 2018-07-26 DIAGNOSIS — M15.9 PRIMARY OSTEOARTHRITIS INVOLVING MULTIPLE JOINTS: ICD-10-CM

## 2018-07-26 DIAGNOSIS — E78.2 MIXED HYPERLIPIDEMIA: ICD-10-CM

## 2018-07-26 PROCEDURE — 99214 OFFICE O/P EST MOD 30 MIN: CPT | Performed by: INTERNAL MEDICINE

## 2018-07-26 NOTE — PROGRESS NOTES
Subjective   Cristal Sams is a 89 y.o. female.     History of Present Illness   Current outpatient and discharge medications have been reconciled for the patient.  Reviewed by: George Rock MD  Patient is following up from an abdominal hernia repair.  Patient is very stable present time.  She is able to eat and has normal bowel movements.  Her lipids a been well-controlled with her diet and statin.  Gastroesophageal reflux also stable with her PPI.  Her osteoarthritis is been controlled with over-the-counter medication.    Dictated utilizing Dragon dictation. If there are questions or for further clarification, please contact me.   The following portions of the patient's history were reviewed and updated as appropriate: allergies, current medications, past family history, past medical history, past social history, past surgical history and problem list.    Review of Systems   Constitutional: Negative for fatigue and fever.   HENT: Positive for congestion. Negative for trouble swallowing.    Eyes: Negative for discharge and visual disturbance.   Respiratory: Negative for choking and shortness of breath.    Cardiovascular: Negative for chest pain and palpitations.   Gastrointestinal: Negative for abdominal pain and blood in stool.   Endocrine: Negative.    Genitourinary: Negative for genital sores and hematuria.   Musculoskeletal: Negative for gait problem and joint swelling.   Skin: Negative for color change, pallor, rash and wound.   Allergic/Immunologic: Positive for environmental allergies. Negative for immunocompromised state.   Neurological: Negative for facial asymmetry and speech difficulty.   Psychiatric/Behavioral: Negative for hallucinations and suicidal ideas.       Objective   Physical Exam   Constitutional: She is oriented to person, place, and time. She appears well-developed and well-nourished.   HENT:   Head: Normocephalic.   Eyes: Pupils are equal, round, and reactive to light.  Conjunctivae are normal.   Neck: Normal range of motion. Neck supple.   Cardiovascular: Normal rate, regular rhythm and normal heart sounds.    Pulmonary/Chest: Effort normal and breath sounds normal.   Abdominal: Soft. Bowel sounds are normal. She exhibits no distension and no mass. There is no tenderness. There is no rebound and no guarding. No hernia.   Musculoskeletal: Normal range of motion.   Neurological: She is alert and oriented to person, place, and time.   Skin: Skin is warm and dry.   Psychiatric: She has a normal mood and affect. Her behavior is normal. Judgment and thought content normal.   Nursing note and vitals reviewed.      Assessment/Plan   Problems Addressed this Visit        Cardiovascular and Mediastinum    Hyperlipemia       Digestive    Gastroesophageal reflux disease with esophagitis       Musculoskeletal and Integument    Osteoarthritis of multiple joints       Other    Hernia of anterior abdominal wall - Primary

## 2018-08-07 ENCOUNTER — HOSPITAL ENCOUNTER (EMERGENCY)
Facility: HOSPITAL | Age: 83
Discharge: HOME | End: 2018-08-07
Attending: EMERGENCY MEDICINE | Admitting: EMERGENCY MEDICINE

## 2018-08-07 VITALS
HEIGHT: 58 IN | SYSTOLIC BLOOD PRESSURE: 185 MMHG | WEIGHT: 99 LBS | DIASTOLIC BLOOD PRESSURE: 76 MMHG | TEMPERATURE: 97.5 F | RESPIRATION RATE: 14 BRPM | HEART RATE: 56 BPM | BODY MASS INDEX: 20.78 KG/M2 | OXYGEN SATURATION: 97 %

## 2018-08-07 DIAGNOSIS — R55 VASOVAGAL NEAR SYNCOPE: Primary | ICD-10-CM

## 2018-08-07 LAB
ALBUMIN SERPL-MCNC: 4.1 G/DL (ref 3.5–5.2)
ALBUMIN/GLOB SERPL: 1.8 G/DL
ALP SERPL-CCNC: 76 U/L (ref 39–117)
ALT SERPL W P-5'-P-CCNC: 14 U/L (ref 1–33)
ANION GAP SERPL CALCULATED.3IONS-SCNC: 10.4 MMOL/L
AST SERPL-CCNC: 15 U/L (ref 1–32)
BASOPHILS # BLD AUTO: 0.01 10*3/MM3 (ref 0–0.2)
BASOPHILS NFR BLD AUTO: 0.1 % (ref 0–1.5)
BILIRUB SERPL-MCNC: 0.2 MG/DL (ref 0.1–1.2)
BUN BLD-MCNC: 16 MG/DL (ref 8–23)
BUN/CREAT SERPL: 23.5 (ref 7–25)
CALCIUM SPEC-SCNC: 9.6 MG/DL (ref 8.2–9.6)
CHLORIDE SERPL-SCNC: 102 MMOL/L (ref 98–107)
CO2 SERPL-SCNC: 26.6 MMOL/L (ref 22–29)
CREAT BLD-MCNC: 0.68 MG/DL (ref 0.57–1)
DEPRECATED RDW RBC AUTO: 46.6 FL (ref 37–54)
EOSINOPHIL # BLD AUTO: 0.06 10*3/MM3 (ref 0–0.7)
EOSINOPHIL NFR BLD AUTO: 0.8 % (ref 0.3–6.2)
ERYTHROCYTE [DISTWIDTH] IN BLOOD BY AUTOMATED COUNT: 13.4 % (ref 11.7–13)
GFR SERPL CREATININE-BSD FRML MDRD: 81 ML/MIN/1.73
GLOBULIN UR ELPH-MCNC: 2.3 GM/DL
GLUCOSE BLD-MCNC: 121 MG/DL (ref 65–99)
HCT VFR BLD AUTO: 35.5 % (ref 35.6–45.5)
HGB BLD-MCNC: 11.4 G/DL (ref 11.9–15.5)
IMM GRANULOCYTES # BLD: 0.03 10*3/MM3 (ref 0–0.03)
IMM GRANULOCYTES NFR BLD: 0.4 % (ref 0–0.5)
LYMPHOCYTES # BLD AUTO: 1.15 10*3/MM3 (ref 0.9–4.8)
LYMPHOCYTES NFR BLD AUTO: 16.3 % (ref 19.6–45.3)
MCH RBC QN AUTO: 30.7 PG (ref 26.9–32)
MCHC RBC AUTO-ENTMCNC: 32.1 G/DL (ref 32.4–36.3)
MCV RBC AUTO: 95.7 FL (ref 80.5–98.2)
MONOCYTES # BLD AUTO: 0.56 10*3/MM3 (ref 0.2–1.2)
MONOCYTES NFR BLD AUTO: 7.9 % (ref 5–12)
NEUTROPHILS # BLD AUTO: 5.29 10*3/MM3 (ref 1.9–8.1)
NEUTROPHILS NFR BLD AUTO: 74.9 % (ref 42.7–76)
PLATELET # BLD AUTO: 308 10*3/MM3 (ref 140–500)
PMV BLD AUTO: 10.2 FL (ref 6–12)
POTASSIUM BLD-SCNC: 3.9 MMOL/L (ref 3.5–5.2)
PROT SERPL-MCNC: 6.4 G/DL (ref 6–8.5)
RBC # BLD AUTO: 3.71 10*6/MM3 (ref 3.9–5.2)
SODIUM BLD-SCNC: 139 MMOL/L (ref 136–145)
TROPONIN T SERPL-MCNC: <0.01 NG/ML (ref 0–0.03)
WBC NRBC COR # BLD: 7.07 10*3/MM3 (ref 4.5–10.7)

## 2018-08-07 PROCEDURE — 93010 ELECTROCARDIOGRAM REPORT: CPT | Performed by: INTERNAL MEDICINE

## 2018-08-07 PROCEDURE — 85025 COMPLETE CBC W/AUTO DIFF WBC: CPT | Performed by: EMERGENCY MEDICINE

## 2018-08-07 PROCEDURE — 93005 ELECTROCARDIOGRAM TRACING: CPT | Performed by: EMERGENCY MEDICINE

## 2018-08-07 PROCEDURE — 84484 ASSAY OF TROPONIN QUANT: CPT | Performed by: EMERGENCY MEDICINE

## 2018-08-07 PROCEDURE — 99284 EMERGENCY DEPT VISIT MOD MDM: CPT

## 2018-08-07 PROCEDURE — 80053 COMPREHEN METABOLIC PANEL: CPT | Performed by: EMERGENCY MEDICINE

## 2018-08-07 RX ORDER — SODIUM CHLORIDE 0.9 % (FLUSH) 0.9 %
10 SYRINGE (ML) INJECTION AS NEEDED
Status: DISCONTINUED | OUTPATIENT
Start: 2018-08-07 | End: 2018-08-08 | Stop reason: HOSPADM

## 2018-08-07 NOTE — ED TRIAGE NOTES
EMS reports family called for possible syncopal episode. Pt was trying to have a bowel movement when she started to feel light  Headed. Pt states she laid her self on the ground.  When ems arrived she was found laying on the ground and alert and oriented.

## 2018-08-07 NOTE — ED PROVIDER NOTES
" EMERGENCY DEPARTMENT ENCOUNTER    CHIEF COMPLAINT  Chief Complaint: near-syncope  History given by: patient  History limited by: none  Room Number: 23/23  PMD: George Rock MD      HPI:  Pt is a 89 y.o. female who presents to the ED via EMS from home s/p near-syncopal episode. Pt advised she was straining to have a BM with abdominal cramping when she began to feel dizzy. She states \"my head got real hot and I began to get sick\". She managed to get off of the toliet and made it to the living room where she laid down after calling for help. She denies LOC. Denies fever, cough, SOA, nausea, vomiting, CP, and recent illness    Duration: pta  Onset: sudden  Timing: constant  Location: n/a  Radiation: n/a  Quality: \"dizzy\"  Intensity/Severity: moderate  Progression: resolved  Associated Symptoms: dizziness, flush, nausea  Aggravating Factors: straining during BM  Alleviating Factors: none  Previous Episodes: vaguely reports a similar episode \"years ago\"  Treatment before arrival: EMS care and intervention    PAST MEDICAL HISTORY  Active Ambulatory Problems     Diagnosis Date Noted   • Osteoarthritis of multiple joints 06/22/2016   • Hyperlipemia 06/22/2016   • Gastroesophageal reflux disease with esophagitis 06/22/2016   • B12 deficiency 06/22/2016   • Essential hypertension 12/16/2016   • Anemia 12/16/2016   • Heme positive stool 12/23/2016   • Ataxia 09/08/2017   • Hypercalcemia 09/19/2017   • Malignant neoplasm of female breast (CMS/[facility]) 09/27/2017   • Malignant neoplasm of central portion of left breast in female, estrogen receptor positive (CMS/[facility]) 10/23/2017   • Influenza B 02/16/2018   • Syncope 02/16/2018   • Bradycardia 02/17/2018   • Neutropenia associated with infection (CMS/[facility]) 02/18/2018   • DNR (do not resuscitate) 02/19/2018   • Nausea & vomiting 02/19/2018   • Pneumonia 03/07/2018   • Type 2 diabetes mellitus without complication (CMS/[facility]) 03/29/2018   • Hernia of anterior abdominal wall 07/26/2018 "     Resolved Ambulatory Problems     Diagnosis Date Noted   • DM (diabetes mellitus), type 2 (CMS/HCC) 12/16/2016   • Breast nodule 09/19/2017   • Hyponatremia 02/16/2018   • Hypoxia 02/17/2018   • Convulsions (CMS/HCC) 02/17/2018   • Slow transit constipation 02/20/2018   • Pneumonia due to influenza A virus 03/07/2018   • Non-recurrent bilateral inguinal hernia with obstruction without gangrene 06/22/2018     Past Medical History:   Diagnosis Date   • Anemia    • Arthritis    • Breast cancer (CMS/HCC)    • Colon polyp 04/13/2012   • Colon polyps 05/23/2017   • Diabetes mellitus (CMS/HCC)    • Diverticulosis    • GERD (gastroesophageal reflux disease)    • H/O Cataract    • Hiatal hernia    • History of diverticulitis    • History of hepatitis 1958   • History of kidney stone    • History of peptic ulcer    • History of vertigo    • Hyperlipidemia    • Hypertension    • Inguinal hernia    • Migraine    • Rheumatoid arthritis (CMS/HCC)    • Sciatic leg pain    • Scoliosis    • Unexplained weight loss    • Visual impairment        PAST SURGICAL HISTORY  Past Surgical History:   Procedure Laterality Date   • BREAST BIOPSY Left 2017   • CATARACT EXTRACTION Bilateral 2008   • COLONOSCOPY N/A 04/12/2012    HEPATIC FLEXURE: Tubular Adenoma, Vicente Monroe   • COLONOSCOPY W/ POLYPECTOMY N/A 05/23/2017    CLS 7mm RT colon polyp Hot snare 2 cm polyp on stalk, Removed w/ hot snare, Vicente Monroe   • DILATATION AND CURETTAGE     • INGUINAL HERNIA REPAIR Bilateral 6/28/2018    Procedure: INGUINAL HERNIA REPAIR LAPAROSCOPIC;  Surgeon: George Phelps MD;  Location: [facility] OR;  Service: General   • MASTECTOMY WITH SENTINEL NODE BIOPSY AND AXILLARY NODE DISSECTION Left 12/7/2017    Procedure:  left total mastectomy, left axillary sentinel lymph node biopsy x 3 ;  Surgeon: Madison Ponce MD;  Location: [facility];  Service:    • SINUS SURGERY      CAUTERIZE A BLEED       FAMILY HISTORY  Family  "History   Problem Relation Age of Onset   • Uterine cancer Mother    • Coronary artery disease Mother    • Lung cancer Brother    • Heart attack Brother    • Heart disease Brother    • Coronary artery disease Sister    • Malig Hyperthermia Neg Hx        SOCIAL HISTORY  Social History     Social History   • Marital status:      Spouse name: N/A   • Number of children: N/A   • Years of education: 6 months college     Occupational History   •  Retired     [employer]     Social History Main Topics   • Smoking status: Never Smoker   • Smokeless tobacco: Never Used   • Alcohol use No   • Drug use: No   • Sexual activity: Defer     Other Topics Concern   • Not on file     Social History Narrative   • No narrative on file       ALLERGIES  Contrast dye; Novocain [procaine]; Aleve [naproxen sodium]; Cortizone-10 [hydrocortisone]; Eye drops [naphazoline-polyethyl glycol]; Lipitor [atorvastatin]; Sulfur; Valium [diazepam]; Zocor [simvastatin]; and Zyrtec [cetirizine]    REVIEW OF SYSTEMS  Review of Systems   Constitutional: Negative for fever.        \"flushed\"   HENT: Negative for sore throat.    Eyes: Negative.    Respiratory: Negative for cough and shortness of breath.    Cardiovascular: Negative for chest pain.   Gastrointestinal: Positive for nausea. Negative for abdominal pain, diarrhea and vomiting.   Genitourinary: Negative for dysuria.   Musculoskeletal: Negative for neck pain.   Skin: Negative for rash.   Allergic/Immunologic: Negative.    Neurological: Positive for dizziness and syncope (near). Negative for weakness, numbness and headaches.   Hematological: Negative.    Psychiatric/Behavioral: Negative.    All other systems reviewed and are negative.      PHYSICAL EXAM  ED Triage Vitals [08/07/18 1927]   Temp Heart Rate Resp BP SpO2   97.5 °F (36.4 °C) 61 18 175/77 93 %      Temp src Heart Rate Source Patient Position BP Location FiO2 (%)   Tympanic -- -- -- --       Physical Exam   Constitutional: " She is oriented to person, place, and time. No distress.   HENT:   Head: Normocephalic and atraumatic.   Eyes: Pupils are equal, round, and reactive to light. EOM are normal.   Neck: Normal range of motion. Neck supple.   Cardiovascular: Normal rate, regular rhythm and normal heart sounds.    Pulmonary/Chest: Effort normal and breath sounds normal. No respiratory distress.   98% on RA   Abdominal: Soft. Bowel sounds are normal. There is no tenderness. There is no rebound and no guarding.   Musculoskeletal: Normal range of motion. She exhibits no edema.   Neurological: She is alert and oriented to person, place, and time. She has normal sensation and normal strength.   Skin: Skin is warm and dry. No rash noted.   Psychiatric: Mood and affect normal.   Nursing note and vitals reviewed.      LAB RESULTS  Lab Results (last 24 hours)     Procedure Component Value Units Date/Time    CBC & Differential [[id number]] Collected:  08/07/18 2034    Specimen:  Blood Updated:  08/07/18 2047    Narrative:       The following orders were created for panel order CBC & Differential.  Procedure                               Abnormality         Status                     ---------                               -----------         ------                     CBC Auto Differential[[id number]]        Abnormal            Final result                 Please view results for these tests on the individual orders.    Comprehensive Metabolic Panel [[id number]]  (Abnormal) Collected:  08/07/18 2034    Specimen:  Blood Updated:  08/07/18 2109     Glucose 121 (H) mg/dL      BUN 16 mg/dL      Creatinine 0.68 mg/dL      Sodium 139 mmol/L      Potassium 3.9 mmol/L      Chloride 102 mmol/L      CO2 26.6 mmol/L      Calcium 9.6 mg/dL      Total Protein 6.4 g/dL      Albumin 4.10 g/dL      ALT (SGPT) 14 U/L      AST (SGOT) 15 U/L      Alkaline Phosphatase 76 U/L      Total Bilirubin 0.2 mg/dL      eGFR Non African Amer 81 mL/min/1.73      Globulin 2.3 gm/dL       A/G Ratio 1.8 g/dL      BUN/Creatinine Ratio 23.5     Anion Gap 10.4 mmol/L     Narrative:       The MDRD GFR formula is only valid for adults with stable renal function between ages 18 and 70.    Troponin [[id number]]  (Normal) Collected:  08/07/18 2034    Specimen:  Blood Updated:  08/07/18 2108     Troponin T <0.010 ng/mL     Narrative:       Troponin T Reference Ranges:  Less than 0.03 ng/mL:    Negative for AMI  0.03 to 0.09 ng/mL:      Indeterminant for AMI  Greater than 0.09 ng/mL: Positive for AMI    CBC Auto Differential [[id number]]  (Abnormal) Collected:  08/07/18 2034    Specimen:  Blood Updated:  08/07/18 2047     WBC 7.07 10*3/mm3      RBC 3.71 (L) 10*6/mm3      Hemoglobin 11.4 (L) g/dL      Hematocrit 35.5 (L) %      MCV 95.7 fL      MCH 30.7 pg      MCHC 32.1 (L) g/dL      RDW 13.4 (H) %      RDW-SD 46.6 fl      MPV 10.2 fL      Platelets 308 10*3/mm3      Neutrophil % 74.9 %      Lymphocyte % 16.3 (L) %      Monocyte % 7.9 %      Eosinophil % 0.8 %      Basophil % 0.1 %      Immature Grans % 0.4 %      Neutrophils, Absolute 5.29 10*3/mm3      Lymphocytes, Absolute 1.15 10*3/mm3      Monocytes, Absolute 0.56 10*3/mm3      Eosinophils, Absolute 0.06 10*3/mm3      Basophils, Absolute 0.01 10*3/mm3      Immature Grans, Absolute 0.03 10*3/mm3           I ordered the above labs and reviewed the results      PROCEDURES  Procedures  EKG           EKG time: 2013  Rhythm/Rate: NSR 60  P waves and MA: nml  QRS, axis: nml   ST and T waves: nml     Interpreted Contemporaneously by me, independently viewed  unchanged compared to prior 02/17/18    PROGRESS AND CONSULTS     BP- 160/60 HR- 60 Temp- 97.5 °F (36.4 °C) (Tympanic) O2 sat- 97%  Rechecked the patient who is in NAD and is resting comfortably. Discussed labs being unremarkable and the plan to d/c w/ her to follow up w/ her PCP. Pt understands and agrees with the plan, all questions answered.    MEDICAL DECISION MAKING  Results were reviewed/discussed  with the patient and they were also made aware of online access. Pt also made aware that some labs, such as cultures, will not be resulted during ER visit and follow up with PMD is necessary.     MDM  Number of Diagnoses or Management Options     Amount and/or Complexity of Data Reviewed  Clinical lab tests: reviewed (unremarkable)  Tests in the medicine section of CPT®: reviewed (See EKG procedure note.)  Decide to obtain previous medical records or to obtain history from someone other than the patient: yes    Patient Progress  Patient progress: stable         DIAGNOSIS  Final diagnoses:   Vasovagal near syncope       DISPOSITION  DISCHARGE    Patient discharged in stable condition.    Reviewed implications of results, diagnosis, meds, responsibility to follow up, warning signs and symptoms of possible worsening, potential complications and reasons to return to ER.    Patient/Family voiced understanding of above instructions.    Discussed plan for discharge, as there is no emergent indication for admission. Patient referred to primary care provider for BP management due to today's BP. Pt/family is agreeable and understands need for follow up and repeat testing.  Pt is aware that discharge does not mean that nothing is wrong but it indicates no emergency is present that requires admission and they must continue care with follow-up as given below or physician of their choice.     FOLLOW-UP  George Rock MD  [address] [id number] [phone]    Schedule an appointment as soon as possible for a visit            Medication List      No changes were made to your prescriptions during this visit.       Latest Documented Vital Signs:  As of 9:45 PM  BP- 160/60 HR- 60 Temp- 97.5 °F (36.4 °C) (Tympanic) O2 sat- 97%    --  Documentation assistance provided by latricia Liao for Dr. Gaytan.  Information recorded by the latricia was done at my direction and has been verified and validated by me.      Lu Liao  08/07/18 2142       Lalo Gaytan MD  08/09/18 3085

## 2018-08-14 ENCOUNTER — TELEPHONE (OUTPATIENT)
Dept: SURGERY | Facility: CLINIC | Age: 83
End: 2018-08-14

## 2018-08-20 RX ORDER — ROSUVASTATIN CALCIUM 5 MG/1
TABLET, COATED ORAL
Qty: 30 TABLET | Refills: 0 | Status: SHIPPED | OUTPATIENT
Start: 2018-08-20 | End: 2018-09-24 | Stop reason: SDUPTHER

## 2018-08-26 NOTE — PROGRESS NOTES
Subjective .     REASONS FOR FOLLOWUP: 1.   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    2.  Status post left mastectomy with sentinel lymph node biopsy on December 7, 2017.  It is T2 N0 M0, stage IIA, ER positive IA positive HER-2/selwyn negative, grade 2, tumor size is 2.7×1.6 cm, invasive colloid carcinoma with 3 cm lymph nodes negative.  Focal ductal carcinoma in situ is present.. Given her age and on discussion with the side effects patient refuses tamoxifen, aromatase inhibitor, Evista.    3.  CT scan shows  small nodules in the lung in the right middle and lower lobe and 2 low-density lesions in the liver which could be followed.    History of Present Illness   patient is a 89-year-old female with newly diagnosed left breast cancer.  She had noticed a left breast mass for several years.  More recently she found that it was getting bigger.  She also had complained of 43 pound weight loss over 3 years.  She underwent bilateral diagnostic mammogram on September 13, 2017.  She had left nipple retraction.  In the retroareolar region of the left breast at 12 o'clock position she had an irregular mass which was 3.1 cm.  No evidence of axillary adenopathy.  Right breast was negative.  On ultrasound she had a 2.5 cm mass.  No evidence of axillary adenopathy.  Biopsy was consistent with invasive mammary carcinoma with abundant mucus consistent with mucinous, colloid carcinoma.  It was intermediate grade.  Frankford score was 6 out of 9.    Her estrogen receptor was greater than 90%, progesterone receptor greater than 40% HER-2/selwyn was 2+ by IHC but negative by fish.  She was referred to Dr. Ponce for surgery.  Given her weight loss Dr. Ponce obtained CT scan of the chest abdomen pelvis as well as a bone scan.    CT scan showed the left breast mass which measured 27×16 mm.  No evidence of axillary or supple with total  nadege enlargement.  Heart size is slightly enlarged.  There is no evidence for mediastinal or hilar nadege enlargement.  Within the right middle lobe there is a 3 mm pulmonary nodule.  A 2 mm inferior right middle lobe nodule is also present in the right major fissure.  There is a 5 mm nodule within the anterior inferior medial right lower lobe.  There are additional smaller nodules within the right middle lobe.  There is an oblong obesity in the right lung base measuring 9×4 mm.  There is calcified granulomas in both lower lobes.  Within the left upper lobe there is a 2 mm nodule.  And a second nodule which is 5 mm.  Within the left manubrium there is a 5 mm sclerotic focus which needs to be followed.  CT of the abdomen pelvis shows 2 tiny nodules in the liver one is 5 mm and a second one is 7 mm.  But there was no definite evidence of metastatic disease in the chest abdomen or pelvis.    Bone scan showed mild focal increased uptake in the right anterior lateral ninth rib consistent with the healed fracture.  No bony evidence of metastases.    Patient underwent left mastectomy and left axillary sentinel lymph node biopsy on December 7, 2017.    Pathology shows invasive mammary carcinoma with mucinous features which is 2.7×2×1.6 cm in size.  It is grade 2, Shaq score of 6 out of 9.  It's a single focus of invasive carcinoma.  Ductal carcinoma in situ is focally present.  Which is grade 2 as well.  No evidence of lobular carcinoma in situ.  Margins are uninvolved by invasive carcinoma and by DCIS.  Closest margin from invasive carcinoma is less than 1 mm in the deep margin.  Distance of ductal carcinoma in situ from closest margin is 10 mm.  All additional margins are greater than 10 mm from invasive carcinoma and from DCIS.  Total of number of lymph nodes examined is 3 and none of them are involved.  It is a T2 N0 M0 invasive mammary carcinoma.    When she was seen here last discussed about hormonal therapy  with either tamoxifen or Arimidex and patient refused.  She refused at this time as well.  She also has lung nodules that inserted with his surveillance was recommended.    Patient has not had a bone density and we discussed about obtaining a bone density as well as a follow-up CT scan for surveillance.    Interval history: Patient denies any complaints.  She had a T2 N0, ER/MD positive HER-2/selwyn negative tumor.  She has refused any kind of hormonal therapy.  She also had a CT scan with chronic small nodular changes in bilateral lungs as well as 2 lesions in the liver which were small and a conservative surveillance was suggested.  She scheduled for mammogram September 14, 2018 and has follow-up with Dr. Ponce on September 21, 2018.  She is status post left mastectomy.  Denies any right breast mass.          Past Medical History:   Diagnosis Date   • Anemia    • Arthritis    • Breast cancer (CMS/HCC)     LEFT   • Colon polyp 04/13/2012    HEPATIC FLEXURE: Tubular Adenoma   • Colon polyps 05/23/2017    RT COLON POLYP: Hyperplastic; SIMOID POLYP: Tubular Adenoma   • Diabetes mellitus (CMS/HCC)     DIET CONTROLLED   • Diverticulosis    • GERD (gastroesophageal reflux disease)    • H/O Cataract    • Hiatal hernia    • History of diverticulitis    • History of hepatitis 1958   • History of kidney stone    • History of peptic ulcer    • History of vertigo    • Hyperlipidemia    • Hypertension    • Inguinal hernia    • Migraine    • Rheumatoid arthritis (CMS/HCC)    • Sciatic leg pain    • Scoliosis    • Unexplained weight loss     #43 lb in 3 months    • Visual impairment        ONCOLOGIC HISTORY:  (History from previous dates can be found in the separate document.)  patient is a 89-year-old female who has history of hypertension and high cholesterol, history of peptic ulcer disease in the past on  Nexium, was found to have a mass in the left breast for more than a year.  She states that it was a small lesion which  gradually increased in size.  She thought it was nothing.  She has a 96-year-old sister who lives with her and had some calcifications which on biopsy were negative.  She thought her lesion in the breast will resolve.  More recently she had pain in the left lower extremity coming from the back as a result she went to Dr. Rock.  He did plain x-rays of the lumbar spine and left knee and at the same time she told him that she had this breast mass.  She then underwent a mammogram.  A bilateral diagnostic mammogram was done.  This showed the left nipple was retracted.  In the retroareolar region of the left breast at the 12 o'clock position there was an irregular 3.1 cm mass.  Left breast skin thickening is also noted.  No evidence for axillary adenopathy is seen in either breast.  No suspicious findings were seen in the right breast.     Ultrasound of the left breast showed that at 12 o'clock position there was an irregular 2.5 cm heterogeneous mass.  No evidence of left axillary adenopathy was appreciated.  This was done on 2017.  Subsequently patient underwent ultrasound-guided biopsy on 2017.  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  It is intermediate grade, Salem score is 6.  Invasive carcinoma involves multiple core biopsy fragments spanning at least 10 mm.  Estrogen receptors greater than 90%, progesterone receptor is 40%, HER-2/selwyn is 2+ by immunohistochemistry, HER-2 copy number is 2.2 with a HER-2 CEP ratio of 1.1, negative.  She has been referred here for further options of treatment.     Patient states that she has a cousin, who is her dad's twin brother's daughter who developed breast cancer at age 70.  Her mother had uterine cancer at age 87 and  from it.  She also thinks she has several cousins with breast cancer but they were older but she's unsure how old.     Patient is very active at home.   She cooks, does yard work, she removes weeds, trims  her shrubs.     She has vertigo when she turns towards the left.  This thought to be positional vertigo but she has not been evaluated by ENT.  She had left leg pain and back pain which is now resolved.     Patient had mild increase in calcium at 10.2.  She underwent a PTH level which was slightly elevated at 74.  She has been referred to endocrinology.  Her calcium today though is down to 9.8.  She also had a very low 25-hydroxy vitamin D level at 7.7 and has been placed on 50,000 units of vitamin D3 every weekly.  She has not been taking it since September  Patient underwent left mastectomy and left axillary sentinel lymph node biopsy on December 7, 2017.    Pathology shows invasive mammary carcinoma with mucinous features which is 2.7×2×1.6 cm in size.  It is grade 2, Richland score of 6 out of 9.  It's a single focus of invasive carcinoma.  Ductal carcinoma in situ is focally present.  Which is grade 2 as well.  No evidence of lobular carcinoma in situ.  Margins are uninvolved by invasive carcinoma and by DCIS.  Closest margin from invasive carcinoma is less than 1 mm in the deep margin.  Distance of ductal carcinoma in situ from closest margin is 10 mm.  All additional margins are greater than 10 mm from invasive carcinoma and from DCIS.  Total of number of lymph nodes examined is 3 and none of them are involved.  It is a T2 N0 M0 invasive mammary carcinoma.    Patient not a candidate for chemotherapy given her age.  Also refused hormonal therapy.    Current Outpatient Prescriptions on File Prior to Visit   Medication Sig Dispense Refill   • amLODIPine (NORVASC) 5 MG tablet Take 5 mg by mouth Daily As Needed (IF BP IS OVER 150/90).     • benzonatate (TESSALON) 100 MG capsule Take 100 mg by mouth 3 (Three) Times a Day As Needed for Cough.     • Cholecalciferol (VITAMIN D3) 5000 units capsule capsule Take 5,000 Units by mouth Daily.     • esomeprazole  (nexIUM) 40 MG capsule Take 40 mg by mouth Every Morning Before Breakfast.     • lisinopril (PRINIVIL,ZESTRIL) 20 MG tablet TAKE ONE TABLET BY MOUTH DAILY 30 tablet 2   • ONE TOUCH ULTRA TEST test strip USE TO TEST BLOOD GLUCOSE DAILY 50 each 2   • ONE TOUCH ULTRA TEST test strip USE TO TEST BLOOD GLUCOSE DAILY 50 each 0   • polyethylene glycol (MIRALAX) packet Take 17 g by mouth Daily. 10 each 0   • rosuvastatin (CRESTOR) 5 MG tablet Take 5 mg by mouth Daily.     • rosuvastatin (CRESTOR) 5 MG tablet TAKE ONE TABLET BY MOUTH DAILY 30 tablet 0     No current facility-administered medications on file prior to visit.        ALLERGIES:     Allergies   Allergen Reactions   • Contrast Dye Other (See Comments)     PASS OUT   • Novocain [Procaine] Palpitations and Paresthesia     LEG GET NUMB   • Aleve [Naproxen Sodium] GI Intolerance   • Cortizone-10 [Hydrocortisone] Other (See Comments)     UNSURE   • Eye Drops [Naphazoline-Polyethyl Glycol] Other (See Comments)     UNSURE   • Lipitor [Atorvastatin] Other (See Comments)     UNSURE   • Sulfur Other (See Comments)     UNSURE   • Valium [Diazepam] Other (See Comments)     Doesn't like the way it makes her feel   • Zocor [Simvastatin] Other (See Comments)     UNSURE   • Zyrtec [Cetirizine] Other (See Comments)     UNSURE     OB/GYN history: Patient started menstrual period at age 14.  She had menopause at age 55.  She has not had any children and she has not had any miscarriages.     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  She retired at age 63 in .  She never smoked.  She never did alcohol.  She is .  She now lives with her 96-year-old sister.  She is very active.     Family history: Father  of an accident at age 56.  Mother  of uterine cancer at age 87.  She had a brother who  of lung cancer at age 61.  She has a brother with  "heart attack at age 46.  She lives with her sister was 96-year-old.  Her father's brother's daughter had breast cancer.  At age 70.  She states that several of her cousins had breast cancer.  But they were all older.         Cancer-related family history includes Lung cancer in her brother; Uterine cancer in her mother.     Review of Systems  A comprehensive 14 point review of systems was performed and was negative except as mentioned.    Objective      Vitals:    08/27/18 1418   BP: 140/62   Pulse: 59   Resp: 16   Temp: 98.1 °F (36.7 °C)   SpO2: 99%   Weight: 44.1 kg (97 lb 3.2 oz)   Height: 150 cm (59.06\")   PainSc:   8   PainLoc: Abdomen  Comment: sciatic nerve pain     Current Status 8/27/2018   ECOG score 0       Physical Exam      GENERAL:  Well-developed, well-nourished in no acute distress.   SKIN:  Warm, dry without rashes, purpura or petechiae.  EYES:  Pupils equal, round and reactive to light.  EOMs intact.  Conjunctivae normal.  EARS:  Hearing intact.  NOSE:  Septum midline.  No excoriations or nasal discharge.  MOUTH:  Tongue is well-papillated; no stomatitis or ulcers.  Lips normal.  THROAT:  Oropharynx without lesions or exudates.  NECK:  Supple with good range of motion; no thyromegaly or masses, no JVD.  LYMPHATICS:  No cervical, supraclavicular, axillary or inguinal adenopathy.  CHEST:  Lungs clear to auscultation. Good airflow.  Breasts: She is status post left mastectomy.  The surgical scar is healed up.  There is no evidence of any left axillary adenopathy or left supraclavicular adenopathy.    Right breast: No evidence of any breast mass, no nipple retraction, no right axillary adenopathy, no right supraclavicular adenopathy, no skin changes.    CARDIAC:  Regular rate and rhythm without murmurs, rubs or gallops. Normal S1,S2.  ABDOMEN:  Soft, nontender with no hepatosplenomegaly or masses.  EXTREMITIES:  No clubbing, cyanosis or edema.  NEUROLOGICAL:  Cranial Nerves II-XII grossly intact.  No " focal neurological deficits.  PSYCHIATRIC:  Normal affect and mood.        RECENT LABS:  Hematology WBC   Date Value Ref Range Status   08/27/2018 5.43 4.00 - 10.00 10*3/mm3 Final     RBC   Date Value Ref Range Status   08/27/2018 3.85 (L) 3.90 - 5.00 10*6/mm3 Final     Hemoglobin   Date Value Ref Range Status   08/27/2018 12.0 11.5 - 14.9 g/dL Final     Hematocrit   Date Value Ref Range Status   08/27/2018 36.8 34.0 - 45.0 % Final     Platelets   Date Value Ref Range Status   08/27/2018 299 150 - 375 10*3/mm3 Final        Assessment/Plan     1.  Left breast mass,T2 N0, stage IIa,     It was estrogen receptor greater than 90%, progesterone receptor 40%, HER-2/selwyn 2+ by immunohistochemistry and  Is 2.2 with a HER-2/CEP ratio of 1.1 which is negative by fish. 2.  Mildly elevated calcium at 10.2 with elevated PTH, her repeat calcium is 9.8 today.  But she is being evaluated by endocrine.  Her hypercalcemia seems to be resolved.  · Status post left mastectomy with sentinel lymph node biopsy.  Pathology shows T2 N0 M0 invasive colloid carcinoma, grade 2 with focal ductal carcinoma in situ at surgery.  3 sentinel lymph nodes are negative.  She has good margins.  She does not require any radiation.  · Discussed in length about hormonal therapy as patient not a candidate for chemotherapy given her age and medical problems.  Patient is not keen to continue any hormonal preparations.  We discussed about both tamoxifen and Arimidex and their side effects in length.  Given her age certainly she could have osteoporosis and a DEXA scan is warranted.  However patient refuses tamoxifen or Arimidex.  Her niece is with her and she is in agreement with that.  She also refused Evista.     3.  Family history of her cousin having had breast cancer at age 70.  No first-degree relative with breast cancer.  However she states several of her cousins have had breast cancer all when they were older.  Patient does not have any children of her  own and does not want any genetic counseling.     4.  Severe vitamin D deficiency for which I encouraged her to continue vitamin D 50,000 units by mouth once weekly.  Will recheck vitamin D levels at her next appointment    5.  CT scan showing lung nodules and 2 low-density lesions in the liver which can be followed by CT scan in 6 months.  Currently willing to consider sevellance  CT scan    6.  Left chest wall fluid collection, appears significant.  There is no pain at the site.  I'm unsure if this requires drainage and will refer to Dr. Ponce.    Plan 1.  Patient not a candidate for chemotherapy.  ·       Patient refuses hormonal therapy with either tamoxifen or Arimidex.  Given her age I believe it is reasonable to observe.  We discussed about Evista as well but patient not a very keen.  · Mammogram scheduled September 14, 2018 with follow-up with Dr. Ponce on September 21, 2018  · Will check CT scan of the chest abdomen in order to follow-up on the chronic nodular changes in the lung as well as 2 lesions in the liver.  · Fu  in November with me with CT scan prior to that    We'll obtain bone density      MD Dr. Shanita Arora Dr.                     Cc:  George Rock MD

## 2018-08-27 ENCOUNTER — OFFICE VISIT (OUTPATIENT)
Dept: ONCOLOGY | Facility: CLINIC | Age: 83
End: 2018-08-27

## 2018-08-27 ENCOUNTER — LAB (OUTPATIENT)
Dept: LAB | Facility: HOSPITAL | Age: 83
End: 2018-08-27

## 2018-08-27 VITALS
BODY MASS INDEX: 19.6 KG/M2 | HEIGHT: 59 IN | TEMPERATURE: 98.1 F | SYSTOLIC BLOOD PRESSURE: 140 MMHG | OXYGEN SATURATION: 99 % | DIASTOLIC BLOOD PRESSURE: 62 MMHG | HEART RATE: 59 BPM | RESPIRATION RATE: 16 BRPM | WEIGHT: 97.2 LBS

## 2018-08-27 DIAGNOSIS — Z17.0 MALIGNANT NEOPLASM OF CENTRAL PORTION OF LEFT BREAST IN FEMALE, ESTROGEN RECEPTOR POSITIVE (HCC): ICD-10-CM

## 2018-08-27 DIAGNOSIS — C50.919 MALIGNANT NEOPLASM OF BREAST IN FEMALE, ESTROGEN RECEPTOR POSITIVE, UNSPECIFIED LATERALITY, UNSPECIFIED SITE OF BREAST (HCC): Primary | ICD-10-CM

## 2018-08-27 DIAGNOSIS — C50.112 MALIGNANT NEOPLASM OF CENTRAL PORTION OF LEFT BREAST IN FEMALE, ESTROGEN RECEPTOR POSITIVE (HCC): ICD-10-CM

## 2018-08-27 DIAGNOSIS — Z17.0 MALIGNANT NEOPLASM OF BREAST IN FEMALE, ESTROGEN RECEPTOR POSITIVE, UNSPECIFIED LATERALITY, UNSPECIFIED SITE OF BREAST (HCC): Primary | ICD-10-CM

## 2018-08-27 DIAGNOSIS — C50.919 MALIGNANT NEOPLASM OF FEMALE BREAST, UNSPECIFIED ESTROGEN RECEPTOR STATUS, UNSPECIFIED LATERALITY, UNSPECIFIED SITE OF BREAST (HCC): ICD-10-CM

## 2018-08-27 DIAGNOSIS — M81.0 OSTEOPOROSIS, UNSPECIFIED OSTEOPOROSIS TYPE, UNSPECIFIED PATHOLOGICAL FRACTURE PRESENCE: ICD-10-CM

## 2018-08-27 PROCEDURE — 85027 COMPLETE CBC AUTOMATED: CPT | Performed by: INTERNAL MEDICINE

## 2018-08-27 PROCEDURE — 36415 COLL VENOUS BLD VENIPUNCTURE: CPT | Performed by: INTERNAL MEDICINE

## 2018-08-27 PROCEDURE — 99213 OFFICE O/P EST LOW 20 MIN: CPT | Performed by: INTERNAL MEDICINE

## 2018-08-27 PROCEDURE — 80053 COMPREHEN METABOLIC PANEL: CPT | Performed by: INTERNAL MEDICINE

## 2018-08-28 ENCOUNTER — TELEPHONE (OUTPATIENT)
Dept: SURGERY | Facility: CLINIC | Age: 83
End: 2018-08-28

## 2018-08-28 NOTE — TELEPHONE ENCOUNTER
8-27-18 note from Dr Penaloza- patient is not a candidate for chemotherapy.  Patient refuses hormonal therapy with either tamoxifen or Arimidex.  Given her age I feel it is reasonable to observe.  Plans for CT of the chest abdomen and workup chronic nodular changes in the lung and liver.  Obtained bone density.

## 2018-09-06 RX ORDER — LANCETS
EACH MISCELLANEOUS
Qty: 100 EACH | Refills: 1 | Status: SHIPPED | OUTPATIENT
Start: 2018-09-06 | End: 2019-01-01 | Stop reason: SDUPTHER

## 2018-09-14 ENCOUNTER — HOSPITAL ENCOUNTER (OUTPATIENT)
Dept: MAMMOGRAPHY | Facility: HOSPITAL | Age: 83
Discharge: HOME | End: 2018-09-14
Attending: SURGERY | Admitting: SURGERY

## 2018-09-14 DIAGNOSIS — C50.112 MALIGNANT NEOPLASM OF CENTRAL PORTION OF LEFT BREAST IN FEMALE, ESTROGEN RECEPTOR POSITIVE (HCC): ICD-10-CM

## 2018-09-14 DIAGNOSIS — Z17.0 MALIGNANT NEOPLASM OF CENTRAL PORTION OF LEFT BREAST IN FEMALE, ESTROGEN RECEPTOR POSITIVE (HCC): ICD-10-CM

## 2018-09-14 DIAGNOSIS — Z12.31 ENCOUNTER FOR SCREENING MAMMOGRAM FOR MALIGNANT NEOPLASM OF BREAST: ICD-10-CM

## 2018-09-14 PROCEDURE — 77063 BREAST TOMOSYNTHESIS BI: CPT

## 2018-09-14 PROCEDURE — 77067 SCR MAMMO BI INCL CAD: CPT

## 2018-09-20 ENCOUNTER — TELEPHONE (OUTPATIENT)
Dept: SURGERY | Facility: CLINIC | Age: 83
End: 2018-09-20

## 2018-09-20 NOTE — TELEPHONE ENCOUNTER
Right screening mammogram with 3-D [facility] September 19, 2018.  Fatty replaced parenchyma.  BI-RADS 2

## 2018-09-21 ENCOUNTER — OFFICE VISIT (OUTPATIENT)
Dept: SURGERY | Facility: CLINIC | Age: 83
End: 2018-09-21

## 2018-09-21 VITALS
WEIGHT: 100 LBS | OXYGEN SATURATION: 98 % | SYSTOLIC BLOOD PRESSURE: 118 MMHG | BODY MASS INDEX: 17.07 KG/M2 | HEIGHT: 64 IN | HEART RATE: 60 BPM | DIASTOLIC BLOOD PRESSURE: 72 MMHG

## 2018-09-21 DIAGNOSIS — C50.112 MALIGNANT NEOPLASM OF CENTRAL PORTION OF LEFT FEMALE BREAST, UNSPECIFIED ESTROGEN RECEPTOR STATUS (HCC): Primary | ICD-10-CM

## 2018-09-21 PROCEDURE — 99212 OFFICE O/P EST SF 10 MIN: CPT | Performed by: SURGERY
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
## 2018-09-24 RX ORDER — ROSUVASTATIN CALCIUM 5 MG/1
TABLET, COATED ORAL
Qty: 30 TABLET | Refills: 0 | Status: SHIPPED | OUTPATIENT
Start: 2018-09-24 | End: 2018-11-03 | Stop reason: SDUPTHER

## 2018-10-22 ENCOUNTER — TELEPHONE (OUTPATIENT)
Dept: FAMILY MEDICINE CLINIC | Facility: CLINIC | Age: 83
End: 2018-10-22

## 2018-10-22 NOTE — TELEPHONE ENCOUNTER
WOULD LIKE A CALL ABOUT HER DIABETIC TESTING STRIPS, BEEN ON SAME ONES 12 YEARS, MAY NEED NEW RX FOR STRIPS AND MACHINE...PLEASE CALL HER TODAY SHE HAS NOT BEEN ABLE TO TEST IN 4 DAYS SHE IS OUT AND NEEDS SOMETHING ASAP...

## 2018-10-22 NOTE — TELEPHONE ENCOUNTER
Called pharmacy they said are waiting on a new prescription for the test strips so they could bill medicare part B so I faxed over a new RX because it would not e-scribe through.I informed patient of this also.

## 2018-10-29 RX ORDER — LISINOPRIL 20 MG/1
TABLET ORAL
Qty: 30 TABLET | Refills: 1 | Status: SHIPPED | OUTPATIENT
Start: 2018-10-29 | End: 2019-01-01 | Stop reason: SDUPTHER

## 2018-11-05 RX ORDER — ROSUVASTATIN CALCIUM 5 MG/1
TABLET, COATED ORAL
Qty: 30 TABLET | Refills: 3 | Status: SHIPPED | OUTPATIENT
Start: 2018-11-05 | End: 2018-01-01 | Stop reason: SDUPTHER

## 2018-11-13 ENCOUNTER — HOSPITAL ENCOUNTER (OUTPATIENT)
Dept: PET IMAGING | Facility: HOSPITAL | Age: 83
Discharge: HOME | End: 2018-11-13
Attending: INTERNAL MEDICINE | Admitting: INTERNAL MEDICINE

## 2018-11-13 ENCOUNTER — HOSPITAL ENCOUNTER (OUTPATIENT)
Dept: CT IMAGING | Facility: HOSPITAL | Age: 83
Discharge: HOME | End: 2018-11-13
Attending: INTERNAL MEDICINE

## 2018-11-13 DIAGNOSIS — Z17.0 MALIGNANT NEOPLASM OF BREAST IN FEMALE, ESTROGEN RECEPTOR POSITIVE, UNSPECIFIED LATERALITY, UNSPECIFIED SITE OF BREAST (HCC): ICD-10-CM

## 2018-11-13 DIAGNOSIS — C50.919 MALIGNANT NEOPLASM OF BREAST IN FEMALE, ESTROGEN RECEPTOR POSITIVE, UNSPECIFIED LATERALITY, UNSPECIFIED SITE OF BREAST (HCC): ICD-10-CM

## 2018-11-13 PROCEDURE — 71250 CT THORAX DX C-: CPT

## 2018-11-13 PROCEDURE — 74176 CT ABD & PELVIS W/O CONTRAST: CPT

## 2018-11-20 ENCOUNTER — APPOINTMENT (OUTPATIENT)
Dept: LAB | Facility: HOSPITAL | Age: 83
End: 2018-11-20

## 2018-11-20 ENCOUNTER — OFFICE VISIT (OUTPATIENT)
Dept: ONCOLOGY | Facility: CLINIC | Age: 83
End: 2018-11-20

## 2018-11-20 VITALS
SYSTOLIC BLOOD PRESSURE: 177 MMHG | BODY MASS INDEX: 17.69 KG/M2 | RESPIRATION RATE: 16 BRPM | HEART RATE: 56 BPM | HEIGHT: 64 IN | WEIGHT: 103.6 LBS | DIASTOLIC BLOOD PRESSURE: 74 MMHG | OXYGEN SATURATION: 98 % | TEMPERATURE: 97.5 F

## 2018-11-20 DIAGNOSIS — C50.919 MALIGNANT NEOPLASM OF FEMALE BREAST, UNSPECIFIED ESTROGEN RECEPTOR STATUS, UNSPECIFIED LATERALITY, UNSPECIFIED SITE OF BREAST (HCC): Primary | ICD-10-CM

## 2018-11-20 PROCEDURE — G0463 HOSPITAL OUTPT CLINIC VISIT: HCPCS | Performed by: INTERNAL MEDICINE

## 2018-11-20 PROCEDURE — 99214 OFFICE O/P EST MOD 30 MIN: CPT | Performed by: INTERNAL MEDICINE

## 2018-11-20 RX ORDER — RALOXIFENE HYDROCHLORIDE 60 MG/1
60 TABLET, FILM COATED ORAL DAILY
Qty: 30 TABLET | Refills: 6 | Status: SHIPPED | OUTPATIENT
Start: 2018-11-20 | End: 2019-01-01

## 2018-11-20 NOTE — PROGRESS NOTES
Subjective .     REASONS FOR FOLLOWUP: 1.   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    2.  Status post left mastectomy with sentinel lymph node biopsy on December 7, 2017.  It is T2 N0 M0, stage IIA, ER positive HI positive HER-2/selwyn negative, grade 2, tumor size is 2.7×1.6 cm, invasive colloid carcinoma with 3 cm lymph nodes negative.  Focal ductal carcinoma in situ is present.. Given her age and on discussion with the side effects patient refuses tamoxifen, aromatase inhibitor, Evista.    3.  CT scan shows  small nodules in the lung in the right middle and lower lobe and 2 low-density lesions in the liver which could be followed.    History of Present Illness   patient is a 89-year-old female with newly diagnosed left breast cancer.  She had noticed a left breast mass for several years.  More recently she found that it was getting bigger.  She also had complained of 43 pound weight loss over 3 years.  She underwent bilateral diagnostic mammogram on September 13, 2017.  She had left nipple retraction.  In the retroareolar region of the left breast at 12 o'clock position she had an irregular mass which was 3.1 cm.  No evidence of axillary adenopathy.  Right breast was negative.  On ultrasound she had a 2.5 cm mass.  No evidence of axillary adenopathy.  Biopsy was consistent with invasive mammary carcinoma with abundant mucus consistent with mucinous, colloid carcinoma.  It was intermediate grade.  [place] score was 6 out of 9.    Her estrogen receptor was greater than 90%, progesterone receptor greater than 40% HER-2/selwyn was 2+ by IHC but negative by fish.  She was referred to Dr. Ponce for surgery.  Given her weight loss Dr. Ponce obtained CT scan of the chest abdomen pelvis as well as a bone scan.    CT scan showed the left breast mass which measured 27×16 mm.  No evidence of axillary or supple with total  nadege enlargement.  Heart size is slightly enlarged.  There is no evidence for mediastinal or hilar nadege enlargement.  Within the right middle lobe there is a 3 mm pulmonary nodule.  A 2 mm inferior right middle lobe nodule is also present in the right major fissure.  There is a 5 mm nodule within the anterior inferior medial right lower lobe.  There are additional smaller nodules within the right middle lobe.  There is an oblong obesity in the right lung base measuring 9×4 mm.  There is calcified granulomas in both lower lobes.  Within the left upper lobe there is a 2 mm nodule.  And a second nodule which is 5 mm.  Within the left manubrium there is a 5 mm sclerotic focus which needs to be followed.  CT of the abdomen pelvis shows 2 tiny nodules in the liver one is 5 mm and a second one is 7 mm.  But there was no definite evidence of metastatic disease in the chest abdomen or pelvis.    Bone scan showed mild focal increased uptake in the right anterior lateral ninth rib consistent with the healed fracture.  No bony evidence of metastases.    Patient underwent left mastectomy and left axillary sentinel lymph node biopsy on December 7, 2017.    Pathology shows invasive mammary carcinoma with mucinous features which is 2.7×2×1.6 cm in size.  It is grade 2, Shaq score of 6 out of 9.  It's a single focus of invasive carcinoma.  Ductal carcinoma in situ is focally present.  Which is grade 2 as well.  No evidence of lobular carcinoma in situ.  Margins are uninvolved by invasive carcinoma and by DCIS.  Closest margin from invasive carcinoma is less than 1 mm in the deep margin.  Distance of ductal carcinoma in situ from closest margin is 10 mm.  All additional margins are greater than 10 mm from invasive carcinoma and from DCIS.  Total of number of lymph nodes examined is 3 and none of them are involved.  It is a T2 N0 M0 invasive mammary carcinoma.    When she was seen here last discussed about hormonal therapy  with either tamoxifen or Arimidex and patient refused.  She refused at this time as well.  She also has lung nodules that inserted with his surveillance was recommended.    Patient has not had a bone density and we discussed about obtaining a bone density as well as a follow-up CT scan for surveillance.    Interval history: Patient denies any complaints.  She had a T2 N0, ER/CT positive HER-2/selwyn negative tumor.  She has refused any kind of hormonal therapy.  She also had a CT scan with chronic small nodular changes in bilateral lungs as well as 2 lesions in the liver which were small and a conservative surveillance was suggested.  She scheduled for mammogram September 14, 2018 and has follow-up with Dr. Ponce on September 21, 2018.  She is status post left mastectomy.  Denies any right breast mass.    Reviewed CT from November 13, 2018 with patient  IMPRESSION:  1. No interval change or convincing evidence for metastatic disease to  the chest, abdomen or pelvis. Tiny pulmonary nodules are not changed and  2 subcentimeter low-density foci medial segment left lobe of liver are  stable.  2. There have been bilateral ventral hernia repairs since the previous  exam.            Past Medical History:   Diagnosis Date   • Anemia    • Arthritis    • Breast cancer (CMS/HCC)     LEFT   • Colon polyp 04/13/2012    HEPATIC FLEXURE: Tubular Adenoma   • Colon polyps 05/23/2017    RT COLON POLYP: Hyperplastic; SIMOID POLYP: Tubular Adenoma   • Diabetes mellitus (CMS/HCC)     DIET CONTROLLED   • Diverticulosis    • GERD (gastroesophageal reflux disease)    • H/O Cataract    • Hiatal hernia    • History of diverticulitis    • History of hepatitis 1958   • History of kidney stone    • History of peptic ulcer    • History of vertigo    • Hyperlipidemia    • Hypertension    • Inguinal hernia    • Migraine    • Rheumatoid arthritis (CMS/HCC)    • Sciatic leg pain    • Scoliosis    • Unexplained weight loss     #43 lb in 3 months    •  Visual impairment        ONCOLOGIC HISTORY:  (History from previous dates can be found in the separate document.)  patient is a 89-year-old female who has history of hypertension and high cholesterol, history of peptic ulcer disease in the past on  Nexium, was found to have a mass in the left breast for more than a year.  She states that it was a small lesion which gradually increased in size.  She thought it was nothing.  She has a 96-year-old sister who lives with her and had some calcifications which on biopsy were negative.  She thought her lesion in the breast will resolve.  More recently she had pain in the left lower extremity coming from the back as a result she went to Dr. Rock.  He did plain x-rays of the lumbar spine and left knee and at the same time she told him that she had this breast mass.  She then underwent a mammogram.  A bilateral diagnostic mammogram was done.  This showed the left nipple was retracted.  In the retroareolar region of the left breast at the 12 o'clock position there was an irregular 3.1 cm mass.  Left breast skin thickening is also noted.  No evidence for axillary adenopathy is seen in either breast.  No suspicious findings were seen in the right breast.     Ultrasound of the left breast showed that at 12 o'clock position there was an irregular 2.5 cm heterogeneous mass.  No evidence of left axillary adenopathy was appreciated.  This was done on September 13, 2017.  Subsequently patient underwent ultrasound-guided biopsy on September 26, 2017.  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  It is intermediate grade, Shaq score is 6.  Invasive carcinoma involves multiple core biopsy fragments spanning at least 10 mm.  Estrogen receptors greater than 90%, progesterone receptor is 40%, HER-2/selwyn is 2+ by immunohistochemistry, HER-2 copy number is 2.2 with a HER-2 CEP ratio of 1.1, negative.  She has been  referred here for further options of treatment.     Patient states that she has a cousin, who is her dad's twin brother's daughter who developed breast cancer at age 70.  Her mother had uterine cancer at age 87 and  from it.  She also thinks she has several cousins with breast cancer but they were older but she's unsure how old.     Patient is very active at home.  She cooks, does yard work, she removes weeds, trims  her shrubs.     She has vertigo when she turns towards the left.  This thought to be positional vertigo but she has not been evaluated by ENT.  She had left leg pain and back pain which is now resolved.     Patient had mild increase in calcium at 10.2.  She underwent a PTH level which was slightly elevated at 74.  She has been referred to endocrinology.  Her calcium today though is down to 9.8.  She also had a very low 25-hydroxy vitamin D level at 7.7 and has been placed on 50,000 units of vitamin D3 every weekly.  She has not been taking it since September  Patient underwent left mastectomy and left axillary sentinel lymph node biopsy on 2017.    Pathology shows invasive mammary carcinoma with mucinous features which is 2.7×2×1.6 cm in size.  It is grade 2, Shaq score of 6 out of 9.  It's a single focus of invasive carcinoma.  Ductal carcinoma in situ is focally present.  Which is grade 2 as well.  No evidence of lobular carcinoma in situ.  Margins are uninvolved by invasive carcinoma and by DCIS.  Closest margin from invasive carcinoma is less than 1 mm in the deep margin.  Distance of ductal carcinoma in situ from closest margin is 10 mm.  All additional margins are greater than 10 mm from invasive carcinoma and from DCIS.  Total of number of lymph nodes examined is 3 and none of them are involved.  It is a T2 N0 M0 invasive mammary carcinoma.    Patient not a candidate for chemotherapy given her age.  Also refused hormonal therapy.    Current Outpatient Medications on File  Prior to Visit   Medication Sig Dispense Refill   • amLODIPine (NORVASC) 5 MG tablet Take 5 mg by mouth Daily As Needed (IF BP IS OVER 150/90).     • Cholecalciferol (VITAMIN D3) 5000 units capsule capsule Take 5,000 Units by mouth Daily.     • esomeprazole (nexIUM) 40 MG capsule Take 40 mg by mouth Every Morning Before Breakfast.     • glucose blood (ONE TOUCH ULTRA TEST) test strip Use to test BS Once Daily  DX code: E11.9 50 each 2   • Lancets (ONETOUCH ULTRASOFT) lancets USE TO TEST BLOOD SUGAR ONCE DAILY 100 each 1   • lisinopril (PRINIVIL,ZESTRIL) 20 MG tablet TAKE ONE TABLET BY MOUTH DAILY 30 tablet 1   • ONE TOUCH ULTRA TEST test strip USE TO TEST BLOOD GLUCOSE DAILY 50 each 0   • polyethylene glycol (MIRALAX) packet Take 17 g by mouth Daily. 10 each 0   • rosuvastatin (CRESTOR) 5 MG tablet Take 5 mg by mouth Daily.     • benzonatate (TESSALON) 100 MG capsule Take 100 mg by mouth 3 (Three) Times a Day As Needed for Cough.     • rosuvastatin (CRESTOR) 5 MG tablet TAKE ONE TABLET BY MOUTH DAILY 30 tablet 3     No current facility-administered medications on file prior to visit.        ALLERGIES:     Allergies   Allergen Reactions   • Contrast Dye Other (See Comments)     PASS OUT   • Novocain [Procaine] Palpitations and Paresthesia     LEG GET NUMB   • Aleve [Naproxen Sodium] GI Intolerance   • Cortizone-10 [Hydrocortisone] Other (See Comments)     UNSURE   • Eye Drops [Naphazoline-Polyethyl Glycol] Other (See Comments)     UNSURE   • Lipitor [Atorvastatin] Other (See Comments)     UNSURE   • Sulfur Other (See Comments)     UNSURE   • Valium [Diazepam] Other (See Comments)     Doesn't like the way it makes her feel   • Zocor [Simvastatin] Other (See Comments)     UNSURE   • Zyrtec [Cetirizine] Other (See Comments)     UNSURE     OB/GYN history: Patient started menstrual period at age 14.  She had menopause at age 55.  She has not had any children and she has not had any miscarriages.     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  She retired at age 63 in .  She never smoked.  She never did alcohol.  She is .  She now lives with her 96-year-old sister.  She is very active.     Family history: Father  of an accident at age 56.  Mother  of uterine cancer at age 87.  She had a brother who  of lung cancer at age 61.  She has a brother with heart attack at age 46.  She lives with her sister was 96-year-old.  Her father's brother's daughter had breast cancer.  At age 70.  She states that several of her cousins had breast cancer.  But they were all older.         Cancer-related family history includes Lung cancer in her brother; Uterine cancer in her mother.     Review of Systems  A comprehensive 14 point review of systems was performed and was negative except as mentioned.    Objective      Vitals:    18 1245   BP: 177/74   Pulse: 56   Resp: 16   Temp: 97.5 °F (36.4 °C)   TempSrc: Oral   SpO2: 98%   Weight: 47 kg (103 lb 9.6 oz)   Height: 162.6 cm (64\")   PainSc: 0-No pain     Current Status 2018   ECOG score 1       Physical Exam      GENERAL:  Well-developed, well-nourished in no acute distress.   SKIN:  Warm, dry without rashes, purpura or petechiae.  EYES:  Pupils equal, round and reactive to light.  EOMs intact.  Conjunctivae normal.  EARS:  Hearing intact.  NOSE:  Septum midline.  No excoriations or nasal discharge.  MOUTH:  Tongue is well-papillated; no stomatitis or ulcers.  Lips normal.  THROAT:  Oropharynx without lesions or exudates.  NECK:  Supple with good range of motion; no thyromegaly or masses, no JVD.  LYMPHATICS:  No cervical, supraclavicular, axillary or inguinal adenopathy.  CHEST:  Lungs clear to auscultation. Good airflow.  Breasts: She is status post left mastectomy.  The surgical scar is healed up.  There is no evidence of any left axillary " adenopathy or left supraclavicular adenopathy.    Right breast: No evidence of any breast mass, no nipple retraction, no right axillary adenopathy, no right supraclavicular adenopathy, no skin changes.    CARDIAC:  Regular rate and rhythm without murmurs, rubs or gallops. Normal S1,S2.  ABDOMEN:  Soft, nontender with no hepatosplenomegaly or masses.  EXTREMITIES:  No clubbing, cyanosis or edema.  NEUROLOGICAL:  Cranial Nerves II-XII grossly intact.  No focal neurological deficits.  PSYCHIATRIC:  Normal affect and mood.        RECENT LABS:  Hematology WBC   Date Value Ref Range Status   08/27/2018 5.43 4.00 - 10.00 10*3/mm3 Final     RBC   Date Value Ref Range Status   08/27/2018 3.85 (L) 3.90 - 5.00 10*6/mm3 Final     Hemoglobin   Date Value Ref Range Status   08/27/2018 12.0 11.5 - 14.9 g/dL Final     Hematocrit   Date Value Ref Range Status   08/27/2018 36.8 34.0 - 45.0 % Final     Platelets   Date Value Ref Range Status   08/27/2018 299 150 - 375 10*3/mm3 Final        CT CHEST, ABDOMEN AND PELVIS   IMPRESSION:  1. No interval change or convincing evidence for metastatic disease to  the chest, abdomen or pelvis. Tiny pulmonary nodules are not changed and  2 subcentimeter low-density foci medial segment left lobe of liver are  stable.  2. There have been bilateral ventral hernia repairs since the previous  exam.  3. 2 mm right interpolar renal nonobstructing stone.    UNILATERAL RIGHT DIGITAL MAMMOGRAPHY   IMPRESSION: September 2018  There are no findings suspicious for malignancy in the right  breast. Routine follow-up mammography is recommended    Assessment/Plan     1.  Left breast mass,T2 N0, stage IIa,     It was estrogen receptor greater than 90%, progesterone receptor 40%, HER-2/selwyn 2+ by immunohistochemistry and  Is 2.2 with a HER-2/CEP ratio of 1.1 which is negative by fish. 2.  Mildly elevated calcium at 10.2 with elevated PTH, her repeat calcium is 9.8 today.  But she is being evaluated by endocrine.   Her hypercalcemia seems to be resolved.  · Status post left mastectomy with sentinel lymph node biopsy.  Pathology shows T2 N0 M0 invasive colloid carcinoma, grade 2 with focal ductal carcinoma in situ at surgery.  3 sentinel lymph nodes are negative.  She has good margins.  She does not require any radiation.  · Discussed in length about hormonal therapy as patient not a candidate for chemotherapy given her age and medical problems.  Patient is not keen to continue any hormonal preparations.  We discussed about both tamoxifen and Arimidex and their side effects in length.  Given her age certainly she could have osteoporosis and a DEXA scan is warranted.  However patient refuses tamoxifen or Arimidex.  Her niece is with her and she is in agreement with that.  She also refused Evista.     3.  Family history of her cousin having had breast cancer at age 70.  No first-degree relative with breast cancer.  However she states several of her cousins have had breast cancer all when they were older.  Patient does not have any children of her own and does not want any genetic counseling.     4.  Severe vitamin D deficiency for which I encouraged her to continue vitamin D 50,000 units by mouth once weekly.  Will recheck vitamin D levels at her next appointment    5.  CT scan showing lung nodules and 2 low-density lesions in the liver which can be followed by CT scan in 6 months.  Currently willing to consider sevellance  CT scan, repeat CT November 2018 negative    Plan 1.  Patient not a candidate for chemotherapy.  ·       Patient refuses hormonal therapy with either tamoxifen or Arimidex.  Given her age I believe it is reasonable to observe.  We discussed about Evista as well.  Patient agreeable to try Evista given osteoporosis and breast cancer.  We'll start Evista 60 mg once daily  · Mammogram  September 14, 2018 is negative  Reviewed CT with the patient, negative for metastases    Follow-up in 6 weeks with nurse  practitioner to evaluate toxicity to Evista    Follow-up with me in 3 months.  His last      MD Dr. Shanita Arora Dr.                     Cc:  Milly Penaloza MD

## 2018-12-11 NOTE — PROGRESS NOTES
QUICK REFERENCE INFORMATION:  The ABCs of the Annual Wellness Visit    Initial Medicare Wellness Visit    HEALTH RISK ASSESSMENT    5/20/1927    Recent Hospitalizations:  Recently treated at the following:  [facility].        Current Medical Providers:  Patient Care Team:  George Rock MD as PCP - General  Pastor Crews MD as Consulting Physician (Gastroenterology)  George Rock MD as Referring Physician (Internal Medicine)  Milly Penaloza MD as Consulting Physician (Hematology and Oncology)        Smoking Status:  Social History     Tobacco Use   Smoking Status Never Smoker   Smokeless Tobacco Never Used       Alcohol Consumption:  Social History     Substance and Sexual Activity   Alcohol Use No       Depression Screen:   PHQ-2/PHQ-9 Depression Screening 12/11/2018   Little interest or pleasure in doing things 0   Feeling down, depressed, or hopeless 0   Trouble falling or staying asleep, or sleeping too much 0   Feeling tired or having little energy 0   Poor appetite or overeating 0   Feeling bad about yourself - or that you are a failure or have let yourself or your family down 0   Trouble concentrating on things, such as reading the newspaper or watching television 0   Moving or speaking so slowly that other people could have noticed. Or the opposite - being so fidgety or restless that you have been moving around a lot more than usual 0   Thoughts that you would be better off dead, or of hurting yourself in some way 0   Total Score 0   If you checked off any problems, how difficult have these problems made it for you to do your work, take care of things at home, or get along with other people? Not difficult at all       Health Habits and Functional and Cognitive Screening:  Functional & Cognitive Status 12/11/2018   Do you have difficulty preparing food and eating? No   Do you have difficulty bathing yourself, getting dressed or grooming yourself? No   Do you have difficulty using  the toilet? No   Do you have difficulty moving around from place to place? No   Do you have trouble with steps or getting out of a bed or a chair? No   In the past year have you fallen or experienced a near fall? No   Current Diet Well Balanced Diet   Dental Exam Not up to date   Eye Exam Not up to date   Exercise (times per week) 7 times per week   Current Exercise Activities Include Walking   Do you need help using the phone?  No   Are you deaf or do you have serious difficulty hearing?  No   Do you need help with transportation? No   Do you need help shopping? No   Do you need help preparing meals?  No   Do you need help with housework?  No   Do you need help with laundry? No   Do you need help taking your medications? No   Do you need help managing money? No   Do you ever drive or ride in a car without wearing a seat belt? No   Have you felt unusual stress, anger or loneliness in the last month? No   Who do you live with? Sibling   If you need help, do you have trouble finding someone available to you? No   Have you been bothered in the last four weeks by sexual problems? No   Do you have difficulty concentrating, remembering or making decisions? No           Does the patient have evidence of cognitive impairment? No    Asiprin use counseling: Does not need ASA (and currently is not on it)      Recent Lab Results:    Visual Acuity:  No exam data present    Age-appropriate Screening Schedule:  Refer to the list below for future screening recommendations based on patient's age, sex and/or medical conditions. Orders for these recommended tests are listed in the plan section. The patient has been provided with a written plan.    Health Maintenance   Topic Date Due   • URINE MICROALBUMIN  05/20/1927   • TDAP/TD VACCINES (1 - Tdap) 05/20/1946   • ZOSTER VACCINE (1 of 2) 05/20/1977   • PNEUMOCOCCAL VACCINES (65+ LOW/MEDIUM RISK) (2 of 2 - PPSV23) 12/11/2018 (Originally 9/8/2018)   • HEMOGLOBIN A1C  01/19/2019   • LIPID  PANEL  07/19/2019   • DXA SCAN  01/26/2020   • MAMMOGRAM  09/14/2020   • INFLUENZA VACCINE  Addressed        Subjective   History of Present Illness    Cristal Sams is a 89 y.o. female who presents for an Annual Wellness Visit.    The following portions of the patient's history were reviewed and updated as appropriate: allergies, current medications, past family history, past medical history, past social history, past surgical history and problem list.    Outpatient Medications Prior to Visit   Medication Sig Dispense Refill   • amLODIPine (NORVASC) 5 MG tablet Take 5 mg by mouth Daily As Needed (IF BP IS OVER 150/90).     • benzonatate (TESSALON) 100 MG capsule Take 100 mg by mouth 3 (Three) Times a Day As Needed for Cough.     • Cholecalciferol (VITAMIN D3) 5000 units capsule capsule Take 5,000 Units by mouth Daily.     • esomeprazole (nexIUM) 40 MG capsule Take 40 mg by mouth Every Morning Before Breakfast.     • glucose blood (ONE TOUCH ULTRA TEST) test strip Use to test BS Once Daily  DX code: E11.9 50 each 2   • HYDROcodone-acetaminophen (NORCO) 5-325 MG per tablet Take 1 tablet by mouth Every 8 (Eight) Hours As Needed for Moderate Pain .     • Lancets (ONETOUCH ULTRASOFT) lancets USE TO TEST BLOOD SUGAR ONCE DAILY 100 each 1   • lisinopril (PRINIVIL,ZESTRIL) 20 MG tablet TAKE ONE TABLET BY MOUTH DAILY 30 tablet 1   • ONE TOUCH ULTRA TEST test strip USE TO TEST BLOOD GLUCOSE DAILY 50 each 0   • raloxifene (EVISTA) 60 MG tablet Take 1 tablet by mouth Daily. 30 tablet 6   • rosuvastatin (CRESTOR) 5 MG tablet Take 5 mg by mouth Daily.     • polyethylene glycol (MIRALAX) packet Take 17 g by mouth Daily. 10 each 0   • rosuvastatin (CRESTOR) 5 MG tablet TAKE ONE TABLET BY MOUTH DAILY 30 tablet 3     No facility-administered medications prior to visit.        Patient Active Problem List   Diagnosis   • Osteoarthritis of multiple joints   • Hyperlipemia   • Gastroesophageal reflux disease with esophagitis   •  "B12 deficiency   • Essential hypertension   • Anemia   • Heme positive stool   • Ataxia   • Hypercalcemia   • Malignant neoplasm of female breast (CMS/HCC)   • Malignant neoplasm of central portion of left breast in female, estrogen receptor positive (CMS/HCC)   • Influenza B   • Syncope   • Bradycardia   • Neutropenia associated with infection (CMS/HCC)   • DNR (do not resuscitate)   • Nausea & vomiting   • Pneumonia   • Type 2 diabetes mellitus without complication (CMS/HCC)   • Hernia of anterior abdominal wall       Advance Care Planning:  has an advance directive - a copy HAS NOT been provided. Have asked the patient to send this to us to add to record.    Identification of Risk Factors:  Risk factors include: NA.    Review of Systems    Compared to one year ago, the patient feels her physical health is the same.  Compared to one year ago, the patient feels her mental health is worse.    Objective     Physical Exam    Vitals:    12/11/18 1310   BP: 134/60   BP Location: Left arm   Patient Position: Sitting   Cuff Size: Adult   Pulse: 53   Resp: 14   Temp: 98 °F (36.7 °C)   TempSrc: Oral   SpO2: 99%   Weight: 48.1 kg (106 lb)   Height: 162.6 cm (64.02\")   PainSc: 0-No pain       Patient's Body mass index is 18.19 kg/m². BMI is within normal parameters. No follow-up required.      Assessment/Plan   Patient Self-Management and Personalized Health Advice  The patient has been provided with information about: NA and preventive services including:   · NA.    Visit Diagnoses:    ICD-10-CM ICD-9-CM   1. Essential hypertension I10 401.9   2. Mixed hyperlipidemia E78.2 272.2   3. Gastroesophageal reflux disease with esophagitis K21.0 530.11       No orders of the defined types were placed in this encounter.      Outpatient Encounter Medications as of 12/11/2018   Medication Sig Dispense Refill   • amLODIPine (NORVASC) 5 MG tablet Take 5 mg by mouth Daily As Needed (IF BP IS OVER 150/90).     • benzonatate (TESSALON) 100 " MG capsule Take 100 mg by mouth 3 (Three) Times a Day As Needed for Cough.     • Cholecalciferol (VITAMIN D3) 5000 units capsule capsule Take 5,000 Units by mouth Daily.     • esomeprazole (nexIUM) 40 MG capsule Take 40 mg by mouth Every Morning Before Breakfast.     • glucose blood (ONE TOUCH ULTRA TEST) test strip Use to test BS Once Daily  DX code: E11.9 50 each 2   • HYDROcodone-acetaminophen (NORCO) 5-325 MG per tablet Take 1 tablet by mouth Every 8 (Eight) Hours As Needed for Moderate Pain .     • Lancets (ONETOUCH ULTRASOFT) lancets USE TO TEST BLOOD SUGAR ONCE DAILY 100 each 1   • lisinopril (PRINIVIL,ZESTRIL) 20 MG tablet TAKE ONE TABLET BY MOUTH DAILY 30 tablet 1   • ONE TOUCH ULTRA TEST test strip USE TO TEST BLOOD GLUCOSE DAILY 50 each 0   • raloxifene (EVISTA) 60 MG tablet Take 1 tablet by mouth Daily. 30 tablet 6   • rosuvastatin (CRESTOR) 5 MG tablet Take 5 mg by mouth Daily.     • polyethylene glycol (MIRALAX) packet Take 17 g by mouth Daily. 10 each 0   • [DISCONTINUED] rosuvastatin (CRESTOR) 5 MG tablet TAKE ONE TABLET BY MOUTH DAILY 30 tablet 3     No facility-administered encounter medications on file as of 12/11/2018.        Reviewed use of high risk medication in the elderly: not applicable  Reviewed for potential of harmful drug interactions in the elderly: not applicable    Follow Up:  No Follow-up on file.     An After Visit Summary and PPPS with all of these plans were given to the patient.

## 2018-12-11 NOTE — PROGRESS NOTES
Subjective   Cristal Sams is a 89 y.o. female.     History of Present Illness   Patient was seen for Medicare wellness exam.  Her blood pressures running running 130s over 80s.  Her lipids been controlled diet and exercise and statin.  She reflux is stabilized on over-the-counter medication.  Patient will continue to monitor blood pressure return to our clinic in 6 months    Dictated utilizing Dragon dictation. If there are questions or for further clarification, please contact me.   The following portions of the patient's history were reviewed and updated as appropriate: allergies, current medications, past family history, past medical history, past social history, past surgical history and problem list.    Review of Systems   Constitutional: Negative for fatigue and fever.   HENT: Positive for congestion. Negative for trouble swallowing.    Eyes: Negative for discharge and visual disturbance.   Respiratory: Negative for choking and shortness of breath.    Cardiovascular: Negative for chest pain and palpitations.   Gastrointestinal: Negative for abdominal pain and blood in stool.   Endocrine: Negative.    Genitourinary: Negative for genital sores and hematuria.   Musculoskeletal: Negative for gait problem and joint swelling.   Skin: Negative for color change, pallor, rash and wound.   Allergic/Immunologic: Positive for environmental allergies. Negative for immunocompromised state.   Neurological: Negative for facial asymmetry and speech difficulty.   Psychiatric/Behavioral: Negative for hallucinations and suicidal ideas.       Objective   Physical Exam   Constitutional: She is oriented to person, place, and time. She appears well-developed and well-nourished.   HENT:   Head: Normocephalic.   Eyes: Conjunctivae are normal. Pupils are equal, round, and reactive to light.   Neck: Normal range of motion. Neck supple.   Cardiovascular: Normal rate, regular rhythm and normal heart sounds.   Pulmonary/Chest:  Effort normal and breath sounds normal.   Abdominal: Soft. Bowel sounds are normal.   Musculoskeletal: Normal range of motion.   Neurological: She is alert and oriented to person, place, and time.   Skin: Skin is warm and dry.   Psychiatric: She has a normal mood and affect. Her behavior is normal. Judgment and thought content normal.   Nursing note and vitals reviewed.      Assessment/Plan   Problems Addressed this Visit        Cardiovascular and Mediastinum    Hyperlipemia    Relevant Orders    Comprehensive Metabolic Panel    CBC & Differential (Completed)    Lipid Panel    Vitamin D 25 Hydroxy    Hemoglobin A1c    CBC Auto Differential (Completed)    Essential hypertension    Relevant Orders    Comprehensive Metabolic Panel    CBC & Differential (Completed)    Lipid Panel    Vitamin D 25 Hydroxy    Hemoglobin A1c    CBC Auto Differential (Completed)       Digestive    Gastroesophageal reflux disease with esophagitis    Relevant Orders    Comprehensive Metabolic Panel    CBC & Differential (Completed)    Lipid Panel    Vitamin D 25 Hydroxy    Hemoglobin A1c    CBC Auto Differential (Completed)    B12 deficiency    Relevant Medications    cyanocobalamin injection 1,000 mcg      Other Visit Diagnoses     Medicare annual wellness visit, initial    -  Primary    Relevant Orders    Comprehensive Metabolic Panel    CBC & Differential (Completed)    Lipid Panel    Vitamin D 25 Hydroxy    CBC Auto Differential (Completed)    Vitamin D deficiency         Relevant Orders    Vitamin D 25 Hydroxy    Abnormal finding of blood chemistry         Relevant Orders    Hemoglobin A1c

## 2018-12-11 NOTE — PATIENT INSTRUCTIONS
Medicare Wellness  Personal Prevention Plan of Service     Date of Office Visit:  2018  Encounter Provider:  George Rock MD  Place of Service:  [facility]  Patient Name: Cristal Sams  :  1927    As part of the Medicare Wellness portion of your visit today, we are providing you with this personalized preventive plan of services (PPPS). This plan is based upon recommendations of the [place] Preventive Services Task Force (USPSTF) and the Advisory Committee on Immunization Practices (ACIP).    This lists the preventive care services that should be considered, and provides dates of when you are due. Items listed as completed are up-to-date and do not require any further intervention.    Health Maintenance   Topic Date Due   • URINE MICROALBUMIN  1927   • HEPATITIS A VACCINE ADULT (1 of 2) 1945   • TDAP/TD VACCINES (1 - Tdap) 1946   • ZOSTER VACCINE (1 of 2) 1977   • MEDICARE ANNUAL WELLNESS  2018   • PNEUMOCOCCAL VACCINES (65+ LOW/MEDIUM RISK) (2 of 2 - PPSV23) 2018 (Originally 2018)   • HEMOGLOBIN A1C  2019   • LIPID PANEL  2019   • DXA SCAN  2020   • MAMMOGRAM  2020   • INFLUENZA VACCINE  Addressed       No orders of the defined types were placed in this encounter.      No Follow-up on file.

## 2019-01-01 ENCOUNTER — TELEPHONE (OUTPATIENT)
Dept: CARDIAC REHAB | Facility: HOSPITAL | Age: 84
End: 2019-01-01

## 2019-01-01 ENCOUNTER — HOSPITAL ENCOUNTER (INPATIENT)
Facility: HOSPITAL | Age: 84
LOS: 1 days | End: 2019-12-08
Attending: EMERGENCY MEDICINE | Admitting: HOSPITALIST

## 2019-01-01 ENCOUNTER — APPOINTMENT (OUTPATIENT)
Dept: CT IMAGING | Facility: HOSPITAL | Age: 84
End: 2019-01-01

## 2019-01-01 ENCOUNTER — TELEPHONE (OUTPATIENT)
Dept: FAMILY MEDICINE CLINIC | Facility: CLINIC | Age: 84
End: 2019-01-01

## 2019-01-01 ENCOUNTER — OFFICE VISIT (OUTPATIENT)
Dept: ONCOLOGY | Facility: CLINIC | Age: 84
End: 2019-01-01

## 2019-01-01 ENCOUNTER — OFFICE VISIT (OUTPATIENT)
Dept: CARDIOLOGY | Facility: CLINIC | Age: 84
End: 2019-01-01

## 2019-01-01 ENCOUNTER — READMISSION MANAGEMENT (OUTPATIENT)
Dept: CALL CENTER | Facility: HOSPITAL | Age: 84
End: 2019-01-01

## 2019-01-01 ENCOUNTER — OFFICE VISIT (OUTPATIENT)
Dept: FAMILY MEDICINE CLINIC | Facility: CLINIC | Age: 84
End: 2019-01-01

## 2019-01-01 ENCOUNTER — LAB (OUTPATIENT)
Dept: LAB | Facility: HOSPITAL | Age: 84
End: 2019-01-01

## 2019-01-01 ENCOUNTER — APPOINTMENT (OUTPATIENT)
Dept: GENERAL RADIOLOGY | Facility: HOSPITAL | Age: 84
End: 2019-01-01

## 2019-01-01 ENCOUNTER — HOSPITAL ENCOUNTER (INPATIENT)
Facility: HOSPITAL | Age: 84
LOS: 1 days | Discharge: HOME | End: 2019-10-02
Attending: EMERGENCY MEDICINE | Admitting: INTERNAL MEDICINE

## 2019-01-01 ENCOUNTER — APPOINTMENT (OUTPATIENT)
Dept: CARDIOLOGY | Facility: HOSPITAL | Age: 84
End: 2019-01-01

## 2019-01-01 VITALS
RESPIRATION RATE: 18 BRPM | OXYGEN SATURATION: 95 % | SYSTOLIC BLOOD PRESSURE: 154 MMHG | WEIGHT: 115 LBS | TEMPERATURE: 98.6 F | DIASTOLIC BLOOD PRESSURE: 65 MMHG | BODY MASS INDEX: 24.14 KG/M2 | HEART RATE: 52 BPM | HEIGHT: 58 IN

## 2019-01-01 VITALS
WEIGHT: 105.9 LBS | HEIGHT: 64 IN | RESPIRATION RATE: 14 BRPM | BODY MASS INDEX: 18.08 KG/M2 | OXYGEN SATURATION: 99 % | HEART RATE: 53 BPM | DIASTOLIC BLOOD PRESSURE: 68 MMHG | SYSTOLIC BLOOD PRESSURE: 168 MMHG | TEMPERATURE: 97.5 F

## 2019-01-01 VITALS
HEIGHT: 62 IN | DIASTOLIC BLOOD PRESSURE: 70 MMHG | WEIGHT: 113 LBS | SYSTOLIC BLOOD PRESSURE: 218 MMHG | TEMPERATURE: 96.1 F | BODY MASS INDEX: 20.8 KG/M2 | OXYGEN SATURATION: 99 %

## 2019-01-01 VITALS
HEIGHT: 64 IN | SYSTOLIC BLOOD PRESSURE: 194 MMHG | WEIGHT: 107.1 LBS | RESPIRATION RATE: 16 BRPM | HEART RATE: 56 BPM | OXYGEN SATURATION: 98 % | BODY MASS INDEX: 18.28 KG/M2 | DIASTOLIC BLOOD PRESSURE: 82 MMHG | TEMPERATURE: 97.5 F

## 2019-01-01 VITALS
HEIGHT: 58 IN | DIASTOLIC BLOOD PRESSURE: 62 MMHG | HEART RATE: 55 BPM | OXYGEN SATURATION: 96 % | WEIGHT: 110 LBS | BODY MASS INDEX: 23.09 KG/M2 | TEMPERATURE: 97.8 F | SYSTOLIC BLOOD PRESSURE: 158 MMHG

## 2019-01-01 VITALS
BODY MASS INDEX: 23 KG/M2 | WEIGHT: 109.6 LBS | SYSTOLIC BLOOD PRESSURE: 134 MMHG | HEIGHT: 58 IN | HEART RATE: 51 BPM | DIASTOLIC BLOOD PRESSURE: 82 MMHG

## 2019-01-01 VITALS
DIASTOLIC BLOOD PRESSURE: 80 MMHG | HEART RATE: 53 BPM | RESPIRATION RATE: 16 BRPM | HEIGHT: 58 IN | TEMPERATURE: 97.4 F | OXYGEN SATURATION: 98 % | SYSTOLIC BLOOD PRESSURE: 180 MMHG | WEIGHT: 109.4 LBS | BODY MASS INDEX: 22.96 KG/M2

## 2019-01-01 VITALS
RESPIRATION RATE: 18 BRPM | TEMPERATURE: 97 F | OXYGEN SATURATION: 96 % | HEIGHT: 58 IN | HEART RATE: 60 BPM | WEIGHT: 110 LBS | DIASTOLIC BLOOD PRESSURE: 78 MMHG | BODY MASS INDEX: 23.09 KG/M2 | SYSTOLIC BLOOD PRESSURE: 134 MMHG

## 2019-01-01 VITALS
WEIGHT: 108 LBS | DIASTOLIC BLOOD PRESSURE: 58 MMHG | RESPIRATION RATE: 16 BRPM | BODY MASS INDEX: 22.67 KG/M2 | SYSTOLIC BLOOD PRESSURE: 122 MMHG | OXYGEN SATURATION: 99 % | TEMPERATURE: 97.9 F | HEIGHT: 58 IN | HEART RATE: 55 BPM

## 2019-01-01 VITALS
HEIGHT: 58 IN | SYSTOLIC BLOOD PRESSURE: 124 MMHG | DIASTOLIC BLOOD PRESSURE: 78 MMHG | BODY MASS INDEX: 22.46 KG/M2 | TEMPERATURE: 98.5 F | HEART RATE: 59 BPM | WEIGHT: 107 LBS | OXYGEN SATURATION: 97 % | RESPIRATION RATE: 14 BRPM

## 2019-01-01 DIAGNOSIS — I25.10 CORONARY ARTERY DISEASE INVOLVING NATIVE CORONARY ARTERY OF NATIVE HEART WITHOUT ANGINA PECTORIS: Primary | ICD-10-CM

## 2019-01-01 DIAGNOSIS — C50.919 MALIGNANT NEOPLASM OF FEMALE BREAST, UNSPECIFIED ESTROGEN RECEPTOR STATUS, UNSPECIFIED LATERALITY, UNSPECIFIED SITE OF BREAST (HCC): ICD-10-CM

## 2019-01-01 DIAGNOSIS — M25.50 ARTHRALGIA, UNSPECIFIED JOINT: ICD-10-CM

## 2019-01-01 DIAGNOSIS — R05.9 COUGH: ICD-10-CM

## 2019-01-01 DIAGNOSIS — E53.8 LOW SERUM VITAMIN B12: ICD-10-CM

## 2019-01-01 DIAGNOSIS — I10 ESSENTIAL HYPERTENSION: ICD-10-CM

## 2019-01-01 DIAGNOSIS — Z17.0 MALIGNANT NEOPLASM OF BREAST IN FEMALE, ESTROGEN RECEPTOR POSITIVE, UNSPECIFIED LATERALITY, UNSPECIFIED SITE OF BREAST (HCC): Primary | ICD-10-CM

## 2019-01-01 DIAGNOSIS — E53.8 VITAMIN B 12 DEFICIENCY: ICD-10-CM

## 2019-01-01 DIAGNOSIS — K21.00 GASTROESOPHAGEAL REFLUX DISEASE WITH ESOPHAGITIS: ICD-10-CM

## 2019-01-01 DIAGNOSIS — I62.9 SPONTANEOUS INTRACRANIAL HEMORRHAGE (HCC): Primary | ICD-10-CM

## 2019-01-01 DIAGNOSIS — C50.919 MALIGNANT NEOPLASM OF FEMALE BREAST, UNSPECIFIED ESTROGEN RECEPTOR STATUS, UNSPECIFIED LATERALITY, UNSPECIFIED SITE OF BREAST (HCC): Primary | ICD-10-CM

## 2019-01-01 DIAGNOSIS — E55.9 VITAMIN D DEFICIENCY: ICD-10-CM

## 2019-01-01 DIAGNOSIS — M25.572 ACUTE LEFT ANKLE PAIN: ICD-10-CM

## 2019-01-01 DIAGNOSIS — C50.112 MALIGNANT NEOPLASM OF CENTRAL PORTION OF LEFT BREAST IN FEMALE, ESTROGEN RECEPTOR POSITIVE (HCC): Primary | ICD-10-CM

## 2019-01-01 DIAGNOSIS — C50.919 MALIGNANT NEOPLASM OF BREAST IN FEMALE, ESTROGEN RECEPTOR POSITIVE, UNSPECIFIED LATERALITY, UNSPECIFIED SITE OF BREAST (HCC): Primary | ICD-10-CM

## 2019-01-01 DIAGNOSIS — R00.1 BRADYCARDIA: ICD-10-CM

## 2019-01-01 DIAGNOSIS — Z17.0 MALIGNANT NEOPLASM OF CENTRAL PORTION OF LEFT BREAST IN FEMALE, ESTROGEN RECEPTOR POSITIVE (HCC): Primary | ICD-10-CM

## 2019-01-01 DIAGNOSIS — E11.9 TYPE 2 DIABETES MELLITUS WITHOUT COMPLICATION, WITHOUT LONG-TERM CURRENT USE OF INSULIN (HCC): ICD-10-CM

## 2019-01-01 DIAGNOSIS — I21.4 NON-STEMI (NON-ST ELEVATED MYOCARDIAL INFARCTION) (HCC): Primary | ICD-10-CM

## 2019-01-01 DIAGNOSIS — I20.0 UNSTABLE ANGINA (HCC): ICD-10-CM

## 2019-01-01 DIAGNOSIS — I10 ESSENTIAL HYPERTENSION: Primary | ICD-10-CM

## 2019-01-01 DIAGNOSIS — Z17.0 MALIGNANT NEOPLASM OF BREAST IN FEMALE, ESTROGEN RECEPTOR POSITIVE, UNSPECIFIED LATERALITY, UNSPECIFIED SITE OF BREAST (HCC): ICD-10-CM

## 2019-01-01 DIAGNOSIS — E78.2 MIXED HYPERLIPIDEMIA: ICD-10-CM

## 2019-01-01 DIAGNOSIS — E53.8 B12 DEFICIENCY: ICD-10-CM

## 2019-01-01 DIAGNOSIS — C50.919 MALIGNANT NEOPLASM OF BREAST IN FEMALE, ESTROGEN RECEPTOR POSITIVE, UNSPECIFIED LATERALITY, UNSPECIFIED SITE OF BREAST (HCC): ICD-10-CM

## 2019-01-01 DIAGNOSIS — E53.8 LOW VITAMIN B12 LEVEL: ICD-10-CM

## 2019-01-01 LAB
25(OH)D3 SERPL-MCNC: 76.7 NG/ML (ref 30–100)
ACT BLD: 153 SECONDS (ref 82–152)
ACT BLD: 208 SECONDS (ref 82–152)
ACT BLD: 268 SECONDS (ref 82–152)
ALBUMIN SERPL-MCNC: 4.1 G/DL (ref 3.5–5.2)
ALBUMIN SERPL-MCNC: 4.3 G/DL (ref 3.5–5.2)
ALBUMIN SERPL-MCNC: 4.4 G/DL (ref 3.5–5.2)
ALBUMIN SERPL-MCNC: 4.7 G/DL (ref 3.5–5.2)
ALBUMIN/GLOB SERPL: 1.7 G/DL
ALBUMIN/GLOB SERPL: 1.7 G/DL (ref 1.1–2.4)
ALBUMIN/GLOB SERPL: 1.7 G/DL (ref 1.1–2.4)
ALBUMIN/GLOB SERPL: 1.9 G/DL (ref 1.1–2.4)
ALBUMIN/GLOB SERPL: 2.2 G/DL
ALBUMIN/GLOB SERPL: 2.2 G/DL
ALP SERPL-CCNC: 56 U/L (ref 39–117)
ALP SERPL-CCNC: 59 U/L (ref 38–116)
ALP SERPL-CCNC: 60 U/L (ref 39–117)
ALP SERPL-CCNC: 67 U/L (ref 39–117)
ALP SERPL-CCNC: 71 U/L (ref 38–116)
ALP SERPL-CCNC: 72 U/L (ref 38–116)
ALT SERPL W P-5'-P-CCNC: 11 U/L (ref 0–33)
ALT SERPL W P-5'-P-CCNC: 11 U/L (ref 1–33)
ALT SERPL W P-5'-P-CCNC: 12 U/L (ref 0–33)
ALT SERPL W P-5'-P-CCNC: 14 U/L (ref 0–33)
ALT SERPL W P-5'-P-CCNC: 14 U/L (ref 1–33)
ALT SERPL W P-5'-P-CCNC: 8 U/L (ref 1–33)
ANION GAP SERPL CALCULATED.3IONS-SCNC: 10 MMOL/L
ANION GAP SERPL CALCULATED.3IONS-SCNC: 10.6 MMOL/L (ref 5–15)
ANION GAP SERPL CALCULATED.3IONS-SCNC: 11.5 MMOL/L (ref 5–15)
ANION GAP SERPL CALCULATED.3IONS-SCNC: 15.8 MMOL/L (ref 5–15)
ANION GAP SERPL CALCULATED.3IONS-SCNC: 8.4 MMOL/L (ref 5–15)
ANION GAP SERPL CALCULATED.3IONS-SCNC: 8.9 MMOL/L (ref 5–15)
ANION GAP SERPL CALCULATED.3IONS-SCNC: 9.1 MMOL/L
APTT PPP: 25 SECONDS (ref 22.7–35.4)
AST SERPL-CCNC: 12 U/L (ref 1–32)
AST SERPL-CCNC: 13 U/L (ref 0–32)
AST SERPL-CCNC: 16 U/L (ref 0–32)
AST SERPL-CCNC: 16 U/L (ref 0–32)
AST SERPL-CCNC: 16 U/L (ref 1–32)
AST SERPL-CCNC: 28 U/L (ref 1–32)
BASOPHILS # BLD AUTO: 0.01 10*3/MM3 (ref 0–0.1)
BASOPHILS # BLD AUTO: 0.02 10*3/MM3 (ref 0–0.2)
BASOPHILS # BLD AUTO: 0.07 10*3/MM3 (ref 0–0.2)
BASOPHILS NFR BLD AUTO: 0.1 % (ref 0–1.1)
BASOPHILS NFR BLD AUTO: 0.3 % (ref 0–1.5)
BH CV ECHO MEAS - ACS: 1.9 CM
BH CV ECHO MEAS - AI DEC SLOPE: 175 CM/SEC^2
BH CV ECHO MEAS - AI MAX PG: 46.5 MMHG
BH CV ECHO MEAS - AI MAX VEL: 341 CM/SEC
BH CV ECHO MEAS - AI P1/2T: 570.7 MSEC
BH CV ECHO MEAS - AO MEAN PG (FULL): 1 MMHG
BH CV ECHO MEAS - AO MEAN PG: 3 MMHG
BH CV ECHO MEAS - AO ROOT AREA (BSA CORRECTED): 1.9
BH CV ECHO MEAS - AO ROOT AREA: 5.7 CM^2
BH CV ECHO MEAS - AO ROOT DIAM: 2.7 CM
BH CV ECHO MEAS - AO V2 MAX: 137 CM/SEC
BH CV ECHO MEAS - AO V2 MEAN: 81.5 CM/SEC
BH CV ECHO MEAS - AO V2 VTI: 29.4 CM
BH CV ECHO MEAS - AVA(I,A): 1.9 CM^2
BH CV ECHO MEAS - AVA(I,D): 1.9 CM^2
BH CV ECHO MEAS - BSA(HAYCOCK): 1.5 M^2
BH CV ECHO MEAS - BSA: 1.4 M^2
BH CV ECHO MEAS - BZI_BMI: 24 KILOGRAMS/M^2
BH CV ECHO MEAS - BZI_METRIC_HEIGHT: 147.3 CM
BH CV ECHO MEAS - BZI_METRIC_WEIGHT: 52.2 KG
BH CV ECHO MEAS - EDV(CUBED): 54.9 ML
BH CV ECHO MEAS - EDV(MOD-SP2): 74 ML
BH CV ECHO MEAS - EDV(MOD-SP4): 76 ML
BH CV ECHO MEAS - EDV(TEICH): 62 ML
BH CV ECHO MEAS - EF(CUBED): 87.5 %
BH CV ECHO MEAS - EF(MOD-BP): 57 %
BH CV ECHO MEAS - EF(MOD-SP2): 52.7 %
BH CV ECHO MEAS - EF(MOD-SP4): 61.8 %
BH CV ECHO MEAS - EF(TEICH): 82 %
BH CV ECHO MEAS - ESV(CUBED): 6.9 ML
BH CV ECHO MEAS - ESV(MOD-SP2): 35 ML
BH CV ECHO MEAS - ESV(MOD-SP4): 29 ML
BH CV ECHO MEAS - ESV(TEICH): 11.2 ML
BH CV ECHO MEAS - FS: 50 %
BH CV ECHO MEAS - IVS/LVPW: 1
BH CV ECHO MEAS - IVSD: 1 CM
BH CV ECHO MEAS - LAT PEAK E' VEL: 7 CM/SEC
BH CV ECHO MEAS - LV DIASTOLIC VOL/BSA (35-75): 52.8 ML/M^2
BH CV ECHO MEAS - LV MASS(C)D: 117.3 GRAMS
BH CV ECHO MEAS - LV MASS(C)DI: 81.5 GRAMS/M^2
BH CV ECHO MEAS - LV MEAN PG: 2 MMHG
BH CV ECHO MEAS - LV SYSTOLIC VOL/BSA (12-30): 20.1 ML/M^2
BH CV ECHO MEAS - LV V1 MAX: 107 CM/SEC
BH CV ECHO MEAS - LV V1 MEAN: 59.2 CM/SEC
BH CV ECHO MEAS - LV V1 VTI: 22.2 CM
BH CV ECHO MEAS - LVIDD: 3.8 CM
BH CV ECHO MEAS - LVIDS: 1.9 CM
BH CV ECHO MEAS - LVLD AP2: 7.2 CM
BH CV ECHO MEAS - LVLD AP4: 7 CM
BH CV ECHO MEAS - LVLS AP2: 6.4 CM
BH CV ECHO MEAS - LVLS AP4: 5.8 CM
BH CV ECHO MEAS - LVOT AREA (M): 2.5 CM^2
BH CV ECHO MEAS - LVOT AREA: 2.5 CM^2
BH CV ECHO MEAS - LVOT DIAM: 1.8 CM
BH CV ECHO MEAS - LVPWD: 1 CM
BH CV ECHO MEAS - MED PEAK E' VEL: 6 CM/SEC
BH CV ECHO MEAS - MR MAX PG: 94.9 MMHG
BH CV ECHO MEAS - MR MAX VEL: 486.5 CM/SEC
BH CV ECHO MEAS - MV A DUR: 0.12 SEC
BH CV ECHO MEAS - MV A MAX VEL: 100 CM/SEC
BH CV ECHO MEAS - MV DEC SLOPE: 358 CM/SEC^2
BH CV ECHO MEAS - MV DEC TIME: 276 SEC
BH CV ECHO MEAS - MV E MAX VEL: 87.9 CM/SEC
BH CV ECHO MEAS - MV E/A: 0.88
BH CV ECHO MEAS - MV MEAN PG: 1 MMHG
BH CV ECHO MEAS - MV P1/2T MAX VEL: 83.8 CM/SEC
BH CV ECHO MEAS - MV P1/2T: 68.6 MSEC
BH CV ECHO MEAS - MV V2 MEAN: 48.2 CM/SEC
BH CV ECHO MEAS - MV V2 VTI: 34.3 CM
BH CV ECHO MEAS - MVA P1/2T LCG: 2.6 CM^2
BH CV ECHO MEAS - MVA(P1/2T): 3.2 CM^2
BH CV ECHO MEAS - MVA(VTI): 1.6 CM^2
BH CV ECHO MEAS - PA ACC SLOPE: 6.1 CM/SEC^2
BH CV ECHO MEAS - PA ACC TIME: 0.18 SEC
BH CV ECHO MEAS - PA PR(ACCEL): -0.65 MMHG
BH CV ECHO MEAS - PULM A REVS DUR: 0.15 SEC
BH CV ECHO MEAS - PULM A REVS VEL: 23.7 CM/SEC
BH CV ECHO MEAS - PULM DIAS VEL: 29.6 CM/SEC
BH CV ECHO MEAS - PULM S/D: 2.2
BH CV ECHO MEAS - PULM SYS VEL: 64.7 CM/SEC
BH CV ECHO MEAS - QP/QS: 1.7
BH CV ECHO MEAS - RAP SYSTOLE: 3 MMHG
BH CV ECHO MEAS - RV MAX PG: 2.1 MMHG
BH CV ECHO MEAS - RV MEAN PG: 1 MMHG
BH CV ECHO MEAS - RV V1 MAX: 73 CM/SEC
BH CV ECHO MEAS - RV V1 MEAN: 47.9 CM/SEC
BH CV ECHO MEAS - RV V1 VTI: 20.1 CM
BH CV ECHO MEAS - RVOT AREA: 4.9 CM^2
BH CV ECHO MEAS - RVOT DIAM: 2.5 CM
BH CV ECHO MEAS - RVSP: 34.6 MMHG
BH CV ECHO MEAS - SI(AO): 116.9 ML/M^2
BH CV ECHO MEAS - SI(CUBED): 33.4 ML/M^2
BH CV ECHO MEAS - SI(LVOT): 39.2 ML/M^2
BH CV ECHO MEAS - SI(MOD-SP2): 27.1 ML/M^2
BH CV ECHO MEAS - SI(MOD-SP4): 32.6 ML/M^2
BH CV ECHO MEAS - SI(TEICH): 35.3 ML/M^2
BH CV ECHO MEAS - SV(AO): 168.3 ML
BH CV ECHO MEAS - SV(CUBED): 48 ML
BH CV ECHO MEAS - SV(LVOT): 56.5 ML
BH CV ECHO MEAS - SV(MOD-SP2): 39 ML
BH CV ECHO MEAS - SV(MOD-SP4): 47 ML
BH CV ECHO MEAS - SV(RVOT): 98.7 ML
BH CV ECHO MEAS - SV(TEICH): 50.8 ML
BH CV ECHO MEAS - TAPSE (>1.6): 1.6 CM2
BH CV ECHO MEAS - TR MAX VEL: 281 CM/SEC
BH CV ECHO MEASUREMENTS AVERAGE E/E' RATIO: 13.52
BH CV VAS BP RIGHT ARM: NORMAL MMHG
BH CV XLRA - RV BASE: 2.9 CM
BH CV XLRA - TDI S': 8 CM/SEC
BILIRUB SERPL-MCNC: 0.3 MG/DL (ref 0.2–1.2)
BILIRUB SERPL-MCNC: 0.4 MG/DL (ref 0.1–1.2)
BILIRUB SERPL-MCNC: 0.4 MG/DL (ref 0.2–1.2)
BILIRUB SERPL-MCNC: 0.5 MG/DL (ref 0.1–1.2)
BILIRUB SERPL-MCNC: 0.5 MG/DL (ref 0.2–1.2)
BILIRUB SERPL-MCNC: 0.5 MG/DL (ref 0.2–1.2)
BUN BLD-MCNC: 10 MG/DL (ref 6–20)
BUN BLD-MCNC: 12 MG/DL (ref 8–23)
BUN BLD-MCNC: 13 MG/DL (ref 6–20)
BUN BLD-MCNC: 13 MG/DL (ref 8–23)
BUN BLD-MCNC: 14 MG/DL (ref 6–20)
BUN/CREAT SERPL: 14.9 (ref 7.3–30)
BUN/CREAT SERPL: 18.8 (ref 7–25)
BUN/CREAT SERPL: 19.7 (ref 7.3–30)
BUN/CREAT SERPL: 20.3 (ref 7–25)
BUN/CREAT SERPL: 20.3 (ref 7–25)
BUN/CREAT SERPL: 21.9 (ref 7.3–30)
BUN/CREAT SERPL: 22.2 (ref 7–25)
CALCIUM SPEC-SCNC: 10.3 MG/DL (ref 8.2–9.6)
CALCIUM SPEC-SCNC: 9 MG/DL (ref 8.2–9.6)
CALCIUM SPEC-SCNC: 9.1 MG/DL (ref 8.2–9.6)
CALCIUM SPEC-SCNC: 9.3 MG/DL (ref 8.2–9.6)
CALCIUM SPEC-SCNC: 9.7 MG/DL (ref 8.5–10.2)
CALCIUM SPEC-SCNC: 9.9 MG/DL (ref 8.5–10.2)
CALCIUM SPEC-SCNC: 9.9 MG/DL (ref 8.5–10.2)
CHLORIDE SERPL-SCNC: 100 MMOL/L (ref 98–107)
CHLORIDE SERPL-SCNC: 100 MMOL/L (ref 98–107)
CHLORIDE SERPL-SCNC: 101 MMOL/L (ref 98–107)
CHLORIDE SERPL-SCNC: 102 MMOL/L (ref 98–107)
CHLORIDE SERPL-SCNC: 102 MMOL/L (ref 98–107)
CHLORIDE SERPL-SCNC: 96 MMOL/L (ref 98–107)
CHLORIDE SERPL-SCNC: 99 MMOL/L (ref 98–107)
CHOLEST SERPL-MCNC: 137 MG/DL (ref 0–200)
CO2 SERPL-SCNC: 23.2 MMOL/L (ref 22–29)
CO2 SERPL-SCNC: 25.4 MMOL/L (ref 22–29)
CO2 SERPL-SCNC: 28 MMOL/L (ref 22–29)
CO2 SERPL-SCNC: 28.5 MMOL/L (ref 22–29)
CO2 SERPL-SCNC: 29.1 MMOL/L (ref 22–29)
CO2 SERPL-SCNC: 29.6 MMOL/L (ref 22–29)
CO2 SERPL-SCNC: 29.9 MMOL/L (ref 22–29)
CREAT BLD-MCNC: 0.54 MG/DL (ref 0.57–1)
CREAT BLD-MCNC: 0.59 MG/DL (ref 0.57–1)
CREAT BLD-MCNC: 0.64 MG/DL (ref 0.57–1)
CREAT BLD-MCNC: 0.64 MG/DL (ref 0.57–1)
CREAT BLD-MCNC: 0.64 MG/DL (ref 0.6–1.1)
CREAT BLD-MCNC: 0.66 MG/DL (ref 0.6–1.1)
CREAT BLD-MCNC: 0.67 MG/DL (ref 0.6–1.1)
DEPRECATED RDW RBC AUTO: 41.2 FL (ref 37–54)
DEPRECATED RDW RBC AUTO: 41.4 FL (ref 37–54)
DEPRECATED RDW RBC AUTO: 41.8 FL (ref 37–54)
DEPRECATED RDW RBC AUTO: 43.4 FL (ref 37–54)
DEPRECATED RDW RBC AUTO: 44.1 FL (ref 37–54)
DEPRECATED RDW RBC AUTO: 44.3 FL (ref 37–54)
DEPRECATED RDW RBC AUTO: 44.5 FL (ref 37–49)
EOSINOPHIL # BLD AUTO: 0.04 10*3/MM3 (ref 0–0.36)
EOSINOPHIL # BLD AUTO: 0.05 10*3/MM3 (ref 0–0.4)
EOSINOPHIL # BLD AUTO: 0.05 10*3/MM3 (ref 0–0.4)
EOSINOPHIL # BLD AUTO: 0.07 10*3/MM3 (ref 0–0.4)
EOSINOPHIL # BLD AUTO: 0.12 10*3/MM3 (ref 0–0.4)
EOSINOPHIL NFR BLD AUTO: 0.2 % (ref 0.3–6.2)
EOSINOPHIL NFR BLD AUTO: 0.6 % (ref 1–5)
EOSINOPHIL NFR BLD AUTO: 0.7 % (ref 0.3–6.2)
EOSINOPHIL NFR BLD AUTO: 1.1 % (ref 0.3–6.2)
EOSINOPHIL NFR BLD AUTO: 2 % (ref 0.3–6.2)
ERYTHROCYTE [DISTWIDTH] IN BLOOD BY AUTOMATED COUNT: 11.8 % (ref 12.3–15.4)
ERYTHROCYTE [DISTWIDTH] IN BLOOD BY AUTOMATED COUNT: 11.8 % (ref 12.3–15.4)
ERYTHROCYTE [DISTWIDTH] IN BLOOD BY AUTOMATED COUNT: 11.9 % (ref 12.3–15.4)
ERYTHROCYTE [DISTWIDTH] IN BLOOD BY AUTOMATED COUNT: 12.1 % (ref 12.3–15.4)
ERYTHROCYTE [DISTWIDTH] IN BLOOD BY AUTOMATED COUNT: 12.3 % (ref 12.3–15.4)
ERYTHROCYTE [DISTWIDTH] IN BLOOD BY AUTOMATED COUNT: 12.3 % (ref 12.3–15.4)
ERYTHROCYTE [DISTWIDTH] IN BLOOD BY AUTOMATED COUNT: 12.5 % (ref 11.7–14.5)
FOLATE SERPL-MCNC: >20 NG/ML (ref 4.78–24.2)
GFR SERPL CREATININE-BSD FRML MDRD: 106 ML/MIN/1.73
GFR SERPL CREATININE-BSD FRML MDRD: 82 ML/MIN/1.73
GFR SERPL CREATININE-BSD FRML MDRD: 84 ML/MIN/1.73
GFR SERPL CREATININE-BSD FRML MDRD: 87 ML/MIN/1.73
GFR SERPL CREATININE-BSD FRML MDRD: 95 ML/MIN/1.73
GLOBULIN UR ELPH-MCNC: 1.9 GM/DL
GLOBULIN UR ELPH-MCNC: 2.1 GM/DL
GLOBULIN UR ELPH-MCNC: 2.3 GM/DL (ref 1.8–3.5)
GLOBULIN UR ELPH-MCNC: 2.6 GM/DL
GLOBULIN UR ELPH-MCNC: 2.6 GM/DL (ref 1.8–3.5)
GLOBULIN UR ELPH-MCNC: 2.6 GM/DL (ref 1.8–3.5)
GLUCOSE BLD-MCNC: 103 MG/DL (ref 65–99)
GLUCOSE BLD-MCNC: 110 MG/DL (ref 65–99)
GLUCOSE BLD-MCNC: 222 MG/DL (ref 65–99)
GLUCOSE BLD-MCNC: 93 MG/DL (ref 65–99)
GLUCOSE BLD-MCNC: 94 MG/DL (ref 74–124)
GLUCOSE BLD-MCNC: 97 MG/DL (ref 74–124)
GLUCOSE BLD-MCNC: 99 MG/DL (ref 74–124)
HBA1C MFR BLD: 5.1 % (ref 4.8–5.6)
HCT VFR BLD AUTO: 31.9 % (ref 34–46.6)
HCT VFR BLD AUTO: 34.5 % (ref 34–46.6)
HCT VFR BLD AUTO: 34.9 % (ref 34–46.6)
HCT VFR BLD AUTO: 37.4 % (ref 34–46.6)
HCT VFR BLD AUTO: 38 % (ref 34–46.6)
HCT VFR BLD AUTO: 38.3 % (ref 34–46.6)
HCT VFR BLD AUTO: 39.2 % (ref 34–45)
HDLC SERPL-MCNC: 70 MG/DL (ref 40–60)
HGB BLD-MCNC: 10.9 G/DL (ref 12–15.9)
HGB BLD-MCNC: 11.4 G/DL (ref 12–15.9)
HGB BLD-MCNC: 11.7 G/DL (ref 12–15.9)
HGB BLD-MCNC: 12.2 G/DL (ref 12–15.9)
HGB BLD-MCNC: 12.2 G/DL (ref 12–15.9)
HGB BLD-MCNC: 12.3 G/DL (ref 12–15.9)
HGB BLD-MCNC: 13 G/DL (ref 11.5–14.9)
IMM GRANULOCYTES # BLD AUTO: 0.01 10*3/MM3 (ref 0–0.03)
IMM GRANULOCYTES # BLD AUTO: 0.01 10*3/MM3 (ref 0–0.05)
IMM GRANULOCYTES # BLD AUTO: 0.02 10*3/MM3 (ref 0–0.05)
IMM GRANULOCYTES # BLD AUTO: 0.02 10*3/MM3 (ref 0–0.05)
IMM GRANULOCYTES # BLD AUTO: 0.18 10*3/MM3 (ref 0–0.05)
IMM GRANULOCYTES NFR BLD AUTO: 0.1 % (ref 0–0.5)
IMM GRANULOCYTES NFR BLD AUTO: 0.2 % (ref 0–0.5)
IMM GRANULOCYTES NFR BLD AUTO: 0.3 % (ref 0–0.5)
IMM GRANULOCYTES NFR BLD AUTO: 0.3 % (ref 0–0.5)
IMM GRANULOCYTES NFR BLD AUTO: 0.9 % (ref 0–0.5)
INR PPP: 0.97 (ref 0.9–1.1)
LDLC SERPL CALC-MCNC: 48 MG/DL (ref 0–100)
LDLC/HDLC SERPL: 0.69 {RATIO}
LEFT ATRIUM VOLUME INDEX: 19 ML/M2
LIPASE SERPL-CCNC: 20 U/L (ref 13–60)
LYMPHOCYTES # BLD AUTO: 1.58 10*3/MM3 (ref 1–3.5)
LYMPHOCYTES # BLD AUTO: 1.59 10*3/MM3 (ref 0.7–3.1)
LYMPHOCYTES # BLD AUTO: 1.81 10*3/MM3 (ref 0.7–3.1)
LYMPHOCYTES # BLD AUTO: 2.2 10*3/MM3 (ref 0.7–3.1)
LYMPHOCYTES # BLD AUTO: 3.29 10*3/MM3 (ref 0.7–3.1)
LYMPHOCYTES NFR BLD AUTO: 16 % (ref 19.6–45.3)
LYMPHOCYTES NFR BLD AUTO: 21.8 % (ref 20–49)
LYMPHOCYTES NFR BLD AUTO: 27.1 % (ref 19.6–45.3)
LYMPHOCYTES NFR BLD AUTO: 28 % (ref 19.6–45.3)
LYMPHOCYTES NFR BLD AUTO: 30.8 % (ref 19.6–45.3)
MAXIMAL PREDICTED HEART RATE: 128 BPM
MCH RBC QN AUTO: 31.1 PG (ref 26.6–33)
MCH RBC QN AUTO: 31.2 PG (ref 26.6–33)
MCH RBC QN AUTO: 31.2 PG (ref 26.6–33)
MCH RBC QN AUTO: 31.7 PG (ref 26.6–33)
MCH RBC QN AUTO: 31.8 PG (ref 27–33)
MCH RBC QN AUTO: 32.4 PG (ref 26.6–33)
MCH RBC QN AUTO: 32.7 PG (ref 26.6–33)
MCHC RBC AUTO-ENTMCNC: 31.9 G/DL (ref 31.5–35.7)
MCHC RBC AUTO-ENTMCNC: 32.4 G/DL (ref 31.5–35.7)
MCHC RBC AUTO-ENTMCNC: 32.6 G/DL (ref 31.5–35.7)
MCHC RBC AUTO-ENTMCNC: 33 G/DL (ref 31.5–35.7)
MCHC RBC AUTO-ENTMCNC: 33.2 G/DL (ref 32–35)
MCHC RBC AUTO-ENTMCNC: 33.5 G/DL (ref 31.5–35.7)
MCHC RBC AUTO-ENTMCNC: 34.2 G/DL (ref 31.5–35.7)
MCV RBC AUTO: 94.5 FL (ref 79–97)
MCV RBC AUTO: 95.8 FL (ref 79–97)
MCV RBC AUTO: 95.8 FL (ref 83–97)
MCV RBC AUTO: 96.2 FL (ref 79–97)
MCV RBC AUTO: 96.7 FL (ref 79–97)
MCV RBC AUTO: 97.1 FL (ref 79–97)
MCV RBC AUTO: 98 FL (ref 79–97)
MONOCYTES # BLD AUTO: 0.44 10*3/MM3 (ref 0.1–0.9)
MONOCYTES # BLD AUTO: 0.48 10*3/MM3 (ref 0.1–0.9)
MONOCYTES # BLD AUTO: 0.49 10*3/MM3 (ref 0.25–0.8)
MONOCYTES # BLD AUTO: 0.52 10*3/MM3 (ref 0.1–0.9)
MONOCYTES # BLD AUTO: 0.89 10*3/MM3 (ref 0.1–0.9)
MONOCYTES NFR BLD AUTO: 4.3 % (ref 5–12)
MONOCYTES NFR BLD AUTO: 6.8 % (ref 4–12)
MONOCYTES NFR BLD AUTO: 6.8 % (ref 5–12)
MONOCYTES NFR BLD AUTO: 7.3 % (ref 5–12)
MONOCYTES NFR BLD AUTO: 8.2 % (ref 5–12)
NEUTROPHILS # BLD AUTO: 16.03 10*3/MM3 (ref 1.7–7)
NEUTROPHILS # BLD AUTO: 3.64 10*3/MM3 (ref 1.7–7)
NEUTROPHILS # BLD AUTO: 4.12 10*3/MM3 (ref 1.7–7)
NEUTROPHILS # BLD AUTO: 4.33 10*3/MM3 (ref 1.4–7)
NEUTROPHILS # BLD AUTO: 5.12 10*3/MM3 (ref 1.5–7)
NEUTROPHILS NFR BLD AUTO: 60.6 % (ref 42.7–76)
NEUTROPHILS NFR BLD AUTO: 62.1 % (ref 42.7–76)
NEUTROPHILS NFR BLD AUTO: 63.6 % (ref 42.7–76)
NEUTROPHILS NFR BLD AUTO: 70.6 % (ref 39–75)
NEUTROPHILS NFR BLD AUTO: 78.3 % (ref 42.7–76)
NRBC BLD AUTO-RTO: 0 /100 WBC (ref 0–0)
NRBC BLD AUTO-RTO: 0 /100 WBC (ref 0–0)
NRBC BLD AUTO-RTO: 0 /100 WBC (ref 0–0.2)
NT-PROBNP SERPL-MCNC: 1193 PG/ML (ref 5–1800)
PLATELET # BLD AUTO: 279 10*3/MM3 (ref 140–450)
PLATELET # BLD AUTO: 279 10*3/MM3 (ref 140–450)
PLATELET # BLD AUTO: 281 10*3/MM3 (ref 140–450)
PLATELET # BLD AUTO: 303 10*3/MM3 (ref 140–450)
PLATELET # BLD AUTO: 303 10*3/MM3 (ref 150–375)
PLATELET # BLD AUTO: 333 10*3/MM3 (ref 140–450)
PLATELET # BLD AUTO: 393 10*3/MM3 (ref 140–450)
PMV BLD AUTO: 10 FL (ref 8.9–12.1)
PMV BLD AUTO: 10.7 FL (ref 6–12)
PMV BLD AUTO: 10.7 FL (ref 6–12)
PMV BLD AUTO: 9.5 FL (ref 6–12)
PMV BLD AUTO: 9.9 FL (ref 6–12)
POTASSIUM BLD-SCNC: 3.4 MMOL/L (ref 3.5–5.2)
POTASSIUM BLD-SCNC: 3.5 MMOL/L (ref 3.5–5.2)
POTASSIUM BLD-SCNC: 3.8 MMOL/L (ref 3.5–5.2)
POTASSIUM BLD-SCNC: 4.2 MMOL/L (ref 3.5–4.7)
POTASSIUM BLD-SCNC: 4.2 MMOL/L (ref 3.5–5.2)
POTASSIUM BLD-SCNC: 4.3 MMOL/L (ref 3.5–4.7)
POTASSIUM BLD-SCNC: 4.4 MMOL/L (ref 3.5–4.7)
PROT SERPL-MCNC: 6 G/DL (ref 6–8.5)
PROT SERPL-MCNC: 6.7 G/DL (ref 6.3–8)
PROT SERPL-MCNC: 6.8 G/DL (ref 6–8.5)
PROT SERPL-MCNC: 6.9 G/DL (ref 6–8.5)
PROT SERPL-MCNC: 7 G/DL (ref 6.3–8)
PROT SERPL-MCNC: 7 G/DL (ref 6.3–8)
PROTHROMBIN TIME: 12.6 SECONDS (ref 11.7–14.2)
RBC # BLD AUTO: 3.33 10*6/MM3 (ref 3.77–5.28)
RBC # BLD AUTO: 3.61 10*6/MM3 (ref 3.77–5.28)
RBC # BLD AUTO: 3.65 10*6/MM3 (ref 3.77–5.28)
RBC # BLD AUTO: 3.85 10*6/MM3 (ref 3.77–5.28)
RBC # BLD AUTO: 3.91 10*6/MM3 (ref 3.77–5.28)
RBC # BLD AUTO: 3.95 10*6/MM3 (ref 3.77–5.28)
RBC # BLD AUTO: 4.09 10*6/MM3 (ref 3.9–5)
SODIUM BLD-SCNC: 135 MMOL/L (ref 136–145)
SODIUM BLD-SCNC: 137 MMOL/L (ref 134–145)
SODIUM BLD-SCNC: 138 MMOL/L (ref 134–145)
SODIUM BLD-SCNC: 138 MMOL/L (ref 136–145)
SODIUM BLD-SCNC: 138 MMOL/L (ref 136–145)
SODIUM BLD-SCNC: 141 MMOL/L (ref 134–145)
SODIUM BLD-SCNC: 141 MMOL/L (ref 136–145)
STRESS TARGET HR: 109 BPM
TRIGL SERPL-MCNC: 95 MG/DL (ref 0–150)
TROPONIN T SERPL-MCNC: 0.15 NG/ML (ref 0–0.03)
VIT B12 BLD-MCNC: >2000 PG/ML (ref 211–946)
VLDLC SERPL-MCNC: 19 MG/DL (ref 5–40)
WBC NRBC COR # BLD: 11 10*3/MM3 (ref 3.4–10.8)
WBC NRBC COR # BLD: 20.51 10*3/MM3 (ref 3.4–10.8)
WBC NRBC COR # BLD: 5.87 10*3/MM3 (ref 3.4–10.8)
WBC NRBC COR # BLD: 6.47 10*3/MM3 (ref 3.4–10.8)
WBC NRBC COR # BLD: 6.5 10*3/MM3 (ref 3.4–10.8)
WBC NRBC COR # BLD: 7.14 10*3/MM3 (ref 3.4–10.8)
WBC NRBC COR # BLD: 7.25 10*3/MM3 (ref 4–10)

## 2019-01-01 PROCEDURE — 99223 1ST HOSP IP/OBS HIGH 75: CPT | Performed by: INTERNAL MEDICINE

## 2019-01-01 PROCEDURE — 96372 THER/PROPH/DIAG INJ SC/IM: CPT | Performed by: INTERNAL MEDICINE

## 2019-01-01 PROCEDURE — C1725 CATH, TRANSLUMIN NON-LASER: HCPCS | Performed by: INTERNAL MEDICINE

## 2019-01-01 PROCEDURE — 93005 ELECTROCARDIOGRAM TRACING: CPT | Performed by: EMERGENCY MEDICINE

## 2019-01-01 PROCEDURE — 99284 EMERGENCY DEPT VISIT MOD MDM: CPT

## 2019-01-01 PROCEDURE — 25010000002 HYDROMORPHONE PER 4 MG: Performed by: HOSPITALIST

## 2019-01-01 PROCEDURE — 25010000002 MIDAZOLAM PER 1 MG: Performed by: INTERNAL MEDICINE

## 2019-01-01 PROCEDURE — 36415 COLL VENOUS BLD VENIPUNCTURE: CPT | Performed by: INTERNAL MEDICINE

## 2019-01-01 PROCEDURE — C1894 INTRO/SHEATH, NON-LASER: HCPCS | Performed by: INTERNAL MEDICINE

## 2019-01-01 PROCEDURE — 93010 ELECTROCARDIOGRAM REPORT: CPT | Performed by: INTERNAL MEDICINE

## 2019-01-01 PROCEDURE — 82962 GLUCOSE BLOOD TEST: CPT

## 2019-01-01 PROCEDURE — 80053 COMPREHEN METABOLIC PANEL: CPT | Performed by: EMERGENCY MEDICINE

## 2019-01-01 PROCEDURE — C1874 STENT, COATED/COV W/DEL SYS: HCPCS | Performed by: INTERNAL MEDICINE

## 2019-01-01 PROCEDURE — 80048 BASIC METABOLIC PNL TOTAL CA: CPT | Performed by: INTERNAL MEDICINE

## 2019-01-01 PROCEDURE — 85025 COMPLETE CBC W/AUTO DIFF WBC: CPT | Performed by: EMERGENCY MEDICINE

## 2019-01-01 PROCEDURE — 83690 ASSAY OF LIPASE: CPT | Performed by: EMERGENCY MEDICINE

## 2019-01-01 PROCEDURE — C1769 GUIDE WIRE: HCPCS | Performed by: INTERNAL MEDICINE

## 2019-01-01 PROCEDURE — 93306 TTE W/DOPPLER COMPLETE: CPT | Performed by: INTERNAL MEDICINE

## 2019-01-01 PROCEDURE — 99213 OFFICE O/P EST LOW 20 MIN: CPT | Performed by: INTERNAL MEDICINE

## 2019-01-01 PROCEDURE — 83036 HEMOGLOBIN GLYCOSYLATED A1C: CPT | Performed by: INTERNAL MEDICINE

## 2019-01-01 PROCEDURE — 36415 COLL VENOUS BLD VENIPUNCTURE: CPT | Performed by: NURSE PRACTITIONER

## 2019-01-01 PROCEDURE — 0 IOPAMIDOL PER 1 ML: Performed by: INTERNAL MEDICINE

## 2019-01-01 PROCEDURE — 80061 LIPID PANEL: CPT | Performed by: INTERNAL MEDICINE

## 2019-01-01 PROCEDURE — 82746 ASSAY OF FOLIC ACID SERUM: CPT | Performed by: INTERNAL MEDICINE

## 2019-01-01 PROCEDURE — 80053 COMPREHEN METABOLIC PANEL: CPT | Performed by: INTERNAL MEDICINE

## 2019-01-01 PROCEDURE — 84484 ASSAY OF TROPONIN QUANT: CPT | Performed by: EMERGENCY MEDICINE

## 2019-01-01 PROCEDURE — B2151ZZ FLUOROSCOPY OF LEFT HEART USING LOW OSMOLAR CONTRAST: ICD-10-PCS | Performed by: INTERNAL MEDICINE

## 2019-01-01 PROCEDURE — 25010000002 ENOXAPARIN PER 10 MG: Performed by: INTERNAL MEDICINE

## 2019-01-01 PROCEDURE — 82306 VITAMIN D 25 HYDROXY: CPT | Performed by: INTERNAL MEDICINE

## 2019-01-01 PROCEDURE — 25010000002 FENTANYL CITRATE (PF) 100 MCG/2ML SOLUTION: Performed by: INTERNAL MEDICINE

## 2019-01-01 PROCEDURE — 25010000002 HEPARIN (PORCINE) PER 1000 UNITS: Performed by: INTERNAL MEDICINE

## 2019-01-01 PROCEDURE — 92928 PRQ TCAT PLMT NTRAC ST 1 LES: CPT | Performed by: INTERNAL MEDICINE

## 2019-01-01 PROCEDURE — 027034Z DILATION OF CORONARY ARTERY, ONE ARTERY WITH DRUG-ELUTING INTRALUMINAL DEVICE, PERCUTANEOUS APPROACH: ICD-10-PCS | Performed by: INTERNAL MEDICINE

## 2019-01-01 PROCEDURE — 85027 COMPLETE CBC AUTOMATED: CPT | Performed by: INTERNAL MEDICINE

## 2019-01-01 PROCEDURE — 25010000002 ONDANSETRON PER 1 MG: Performed by: INTERNAL MEDICINE

## 2019-01-01 PROCEDURE — 85025 COMPLETE CBC W/AUTO DIFF WBC: CPT | Performed by: NURSE PRACTITIONER

## 2019-01-01 PROCEDURE — 85610 PROTHROMBIN TIME: CPT | Performed by: EMERGENCY MEDICINE

## 2019-01-01 PROCEDURE — 99214 OFFICE O/P EST MOD 30 MIN: CPT | Performed by: INTERNAL MEDICINE

## 2019-01-01 PROCEDURE — 93000 ELECTROCARDIOGRAM COMPLETE: CPT | Performed by: INTERNAL MEDICINE

## 2019-01-01 PROCEDURE — C9600 PERC DRUG-EL COR STENT SING: HCPCS | Performed by: INTERNAL MEDICINE

## 2019-01-01 PROCEDURE — 85347 COAGULATION TIME ACTIVATED: CPT

## 2019-01-01 PROCEDURE — 70450 CT HEAD/BRAIN W/O DYE: CPT

## 2019-01-01 PROCEDURE — 73600 X-RAY EXAM OF ANKLE: CPT | Performed by: INTERNAL MEDICINE

## 2019-01-01 PROCEDURE — 93458 L HRT ARTERY/VENTRICLE ANGIO: CPT | Performed by: INTERNAL MEDICINE

## 2019-01-01 PROCEDURE — 99232 SBSQ HOSP IP/OBS MODERATE 35: CPT | Performed by: INTERNAL MEDICINE

## 2019-01-01 PROCEDURE — 25010000002 METHYLPREDNISOLONE PER 125 MG: Performed by: INTERNAL MEDICINE

## 2019-01-01 PROCEDURE — 99213 OFFICE O/P EST LOW 20 MIN: CPT | Performed by: NURSE PRACTITIONER

## 2019-01-01 PROCEDURE — 25010000002 DIPHENHYDRAMINE PER 50 MG: Performed by: INTERNAL MEDICINE

## 2019-01-01 PROCEDURE — 85025 COMPLETE CBC W/AUTO DIFF WBC: CPT | Performed by: INTERNAL MEDICINE

## 2019-01-01 PROCEDURE — 83880 ASSAY OF NATRIURETIC PEPTIDE: CPT | Performed by: EMERGENCY MEDICINE

## 2019-01-01 PROCEDURE — 99152 MOD SED SAME PHYS/QHP 5/>YRS: CPT | Performed by: INTERNAL MEDICINE

## 2019-01-01 PROCEDURE — 93306 TTE W/DOPPLER COMPLETE: CPT

## 2019-01-01 PROCEDURE — 93005 ELECTROCARDIOGRAM TRACING: CPT | Performed by: INTERNAL MEDICINE

## 2019-01-01 PROCEDURE — 71045 X-RAY EXAM CHEST 1 VIEW: CPT

## 2019-01-01 PROCEDURE — 93005 ELECTROCARDIOGRAM TRACING: CPT | Performed by: NURSE PRACTITIONER

## 2019-01-01 PROCEDURE — B2111ZZ FLUOROSCOPY OF MULTIPLE CORONARY ARTERIES USING LOW OSMOLAR CONTRAST: ICD-10-PCS | Performed by: INTERNAL MEDICINE

## 2019-01-01 PROCEDURE — 99153 MOD SED SAME PHYS/QHP EA: CPT | Performed by: INTERNAL MEDICINE

## 2019-01-01 PROCEDURE — C1760 CLOSURE DEV, VASC: HCPCS | Performed by: INTERNAL MEDICINE

## 2019-01-01 PROCEDURE — 99285 EMERGENCY DEPT VISIT HI MDM: CPT

## 2019-01-01 PROCEDURE — C1887 CATHETER, GUIDING: HCPCS | Performed by: INTERNAL MEDICINE

## 2019-01-01 PROCEDURE — 80053 COMPREHEN METABOLIC PANEL: CPT | Performed by: NURSE PRACTITIONER

## 2019-01-01 PROCEDURE — 85730 THROMBOPLASTIN TIME PARTIAL: CPT | Performed by: EMERGENCY MEDICINE

## 2019-01-01 PROCEDURE — 82607 VITAMIN B-12: CPT | Performed by: INTERNAL MEDICINE

## 2019-01-01 PROCEDURE — 4A023N7 MEASUREMENT OF CARDIAC SAMPLING AND PRESSURE, LEFT HEART, PERCUTANEOUS APPROACH: ICD-10-PCS | Performed by: INTERNAL MEDICINE

## 2019-01-01 DEVICE — XIENCE SIERRA™ EVEROLIMUS ELUTING CORONARY STENT SYSTEM 2.50 MM X 18 MM / RAPID-EXCHANGE
Type: IMPLANTABLE DEVICE | Status: FUNCTIONAL
Brand: XIENCE SIERRA™

## 2019-01-01 RX ORDER — LORAZEPAM 2 MG/ML
2 INJECTION INTRAMUSCULAR
Status: DISCONTINUED | OUTPATIENT
Start: 2019-01-01 | End: 2019-01-01 | Stop reason: SDUPTHER

## 2019-01-01 RX ORDER — LORAZEPAM 2 MG/ML
1 INJECTION INTRAMUSCULAR
Status: DISCONTINUED | OUTPATIENT
Start: 2019-01-01 | End: 2019-01-01 | Stop reason: HOSPADM

## 2019-01-01 RX ORDER — LORAZEPAM 2 MG/ML
1 CONCENTRATE ORAL
Status: DISCONTINUED | OUTPATIENT
Start: 2019-01-01 | End: 2019-01-01 | Stop reason: HOSPADM

## 2019-01-01 RX ORDER — CYANOCOBALAMIN 1000 UG/ML
1000 INJECTION, SOLUTION INTRAMUSCULAR; SUBCUTANEOUS
Status: DISCONTINUED | OUTPATIENT
Start: 2019-01-01 | End: 2019-01-01

## 2019-01-01 RX ORDER — SODIUM CHLORIDE 0.9 % (FLUSH) 0.9 %
10 SYRINGE (ML) INJECTION AS NEEDED
Status: DISCONTINUED | OUTPATIENT
Start: 2019-01-01 | End: 2019-01-01 | Stop reason: HOSPADM

## 2019-01-01 RX ORDER — ACETAMINOPHEN 325 MG/1
650 TABLET ORAL EVERY 4 HOURS PRN
Status: DISCONTINUED | OUTPATIENT
Start: 2019-01-01 | End: 2019-01-01 | Stop reason: SDUPTHER

## 2019-01-01 RX ORDER — SODIUM CHLORIDE 9 MG/ML
INJECTION, SOLUTION INTRAVENOUS CONTINUOUS PRN
Status: COMPLETED | OUTPATIENT
Start: 2019-01-01 | End: 2019-01-01

## 2019-01-01 RX ORDER — NITROGLYCERIN 0.4 MG/1
0.4 TABLET SUBLINGUAL
Status: DISCONTINUED | OUTPATIENT
Start: 2019-01-01 | End: 2019-01-01 | Stop reason: HOSPADM

## 2019-01-01 RX ORDER — ROSUVASTATIN CALCIUM 5 MG/1
TABLET, COATED ORAL
Qty: 30 TABLET | Refills: 3 | Status: SHIPPED | OUTPATIENT
Start: 2019-01-01

## 2019-01-01 RX ORDER — HEPARIN SODIUM 1000 [USP'U]/ML
INJECTION, SOLUTION INTRAVENOUS; SUBCUTANEOUS AS NEEDED
Status: DISCONTINUED | OUTPATIENT
Start: 2019-01-01 | End: 2019-01-01 | Stop reason: HOSPADM

## 2019-01-01 RX ORDER — LISINOPRIL 20 MG/1
TABLET ORAL
Qty: 90 TABLET | Refills: 0 | Status: SHIPPED | OUTPATIENT
Start: 2019-01-01

## 2019-01-01 RX ORDER — DIPHENHYDRAMINE HYDROCHLORIDE 50 MG/ML
INJECTION INTRAMUSCULAR; INTRAVENOUS AS NEEDED
Status: DISCONTINUED | OUTPATIENT
Start: 2019-01-01 | End: 2019-01-01 | Stop reason: HOSPADM

## 2019-01-01 RX ORDER — BENZONATATE 100 MG/1
100 CAPSULE ORAL 3 TIMES DAILY PRN
Qty: 30 CAPSULE | Refills: 3
Start: 2019-01-01 | End: 2019-01-01

## 2019-01-01 RX ORDER — LORAZEPAM 2 MG/ML
0.5 CONCENTRATE ORAL
Status: DISCONTINUED | OUTPATIENT
Start: 2019-01-01 | End: 2019-01-01 | Stop reason: HOSPADM

## 2019-01-01 RX ORDER — LISINOPRIL 20 MG/1
20 TABLET ORAL DAILY
Qty: 90 TABLET | Refills: 1 | Status: SHIPPED | OUTPATIENT
Start: 2019-01-01 | End: 2019-01-01 | Stop reason: SDUPTHER

## 2019-01-01 RX ORDER — ACETAMINOPHEN 325 MG/1
650 TABLET ORAL EVERY 4 HOURS PRN
Status: DISCONTINUED | OUTPATIENT
Start: 2019-01-01 | End: 2019-01-01 | Stop reason: HOSPADM

## 2019-01-01 RX ORDER — HYDROMORPHONE HYDROCHLORIDE 1 MG/ML
0.5 INJECTION, SOLUTION INTRAMUSCULAR; INTRAVENOUS; SUBCUTANEOUS
Status: DISCONTINUED | OUTPATIENT
Start: 2019-01-01 | End: 2019-01-01 | Stop reason: HOSPADM

## 2019-01-01 RX ORDER — ATROPINE SULFATE 10 MG/ML
2 SOLUTION/ DROPS OPHTHALMIC 2 TIMES DAILY PRN
Status: DISCONTINUED | OUTPATIENT
Start: 2019-01-01 | End: 2019-01-01 | Stop reason: HOSPADM

## 2019-01-01 RX ORDER — LORAZEPAM 2 MG/ML
2 CONCENTRATE ORAL
Status: DISCONTINUED | OUTPATIENT
Start: 2019-01-01 | End: 2019-01-01 | Stop reason: HOSPADM

## 2019-01-01 RX ORDER — NITROGLYCERIN 0.4 MG/1
0.4 TABLET SUBLINGUAL
Qty: 30 TABLET | Refills: 12 | Status: SHIPPED | OUTPATIENT
Start: 2019-01-01

## 2019-01-01 RX ORDER — ONDANSETRON 2 MG/ML
4 INJECTION INTRAMUSCULAR; INTRAVENOUS EVERY 6 HOURS PRN
Status: DISCONTINUED | OUTPATIENT
Start: 2019-01-01 | End: 2019-01-01 | Stop reason: HOSPADM

## 2019-01-01 RX ORDER — ACETAMINOPHEN 160 MG/5ML
650 SOLUTION ORAL EVERY 4 HOURS PRN
Status: DISCONTINUED | OUTPATIENT
Start: 2019-01-01 | End: 2019-01-01 | Stop reason: SDUPTHER

## 2019-01-01 RX ORDER — MORPHINE SULFATE 20 MG/ML
20 SOLUTION ORAL
Status: DISCONTINUED | OUTPATIENT
Start: 2019-01-01 | End: 2019-01-01 | Stop reason: HOSPADM

## 2019-01-01 RX ORDER — MORPHINE SULFATE 20 MG/ML
10 SOLUTION ORAL
Status: DISCONTINUED | OUTPATIENT
Start: 2019-01-01 | End: 2019-01-01 | Stop reason: HOSPADM

## 2019-01-01 RX ORDER — LANCETS
EACH MISCELLANEOUS
Qty: 100 EACH | Refills: 3 | Status: SHIPPED | OUTPATIENT
Start: 2019-01-01

## 2019-01-01 RX ORDER — ROSUVASTATIN CALCIUM 5 MG/1
TABLET, COATED ORAL
Qty: 30 TABLET | Refills: 2 | Status: SHIPPED | OUTPATIENT
Start: 2019-01-01 | End: 2019-01-01 | Stop reason: SDUPTHER

## 2019-01-01 RX ORDER — NITROGLYCERIN 20 MG/100ML
5-200 INJECTION INTRAVENOUS
Status: DISCONTINUED | OUTPATIENT
Start: 2019-01-01 | End: 2019-01-01

## 2019-01-01 RX ORDER — LISINOPRIL 20 MG/1
20 TABLET ORAL DAILY
Status: DISCONTINUED | OUTPATIENT
Start: 2019-01-01 | End: 2019-01-01 | Stop reason: HOSPADM

## 2019-01-01 RX ORDER — HYDROCODONE BITARTRATE AND ACETAMINOPHEN 5; 325 MG/1; MG/1
1 TABLET ORAL EVERY 6 HOURS PRN
Qty: 30 TABLET | Refills: 0
Start: 2019-01-01 | End: 2019-01-01

## 2019-01-01 RX ORDER — ONDANSETRON 2 MG/ML
INJECTION INTRAMUSCULAR; INTRAVENOUS AS NEEDED
Status: DISCONTINUED | OUTPATIENT
Start: 2019-01-01 | End: 2019-01-01 | Stop reason: HOSPADM

## 2019-01-01 RX ORDER — MELOXICAM 7.5 MG/1
7.5 TABLET ORAL DAILY
Qty: 60 TABLET | Refills: 3 | Status: SHIPPED | OUTPATIENT
Start: 2019-01-01 | End: 2019-01-01

## 2019-01-01 RX ORDER — LISINOPRIL 20 MG/1
20 TABLET ORAL DAILY
Qty: 30 TABLET | Refills: 1 | Status: SHIPPED | OUTPATIENT
Start: 2019-01-01 | End: 2019-01-01 | Stop reason: SDUPTHER

## 2019-01-01 RX ORDER — DIPHENOXYLATE HYDROCHLORIDE AND ATROPINE SULFATE 2.5; .025 MG/1; MG/1
1 TABLET ORAL
Status: DISCONTINUED | OUTPATIENT
Start: 2019-01-01 | End: 2019-01-01 | Stop reason: SDUPTHER

## 2019-01-01 RX ORDER — MIDAZOLAM HYDROCHLORIDE 1 MG/ML
INJECTION INTRAMUSCULAR; INTRAVENOUS AS NEEDED
Status: DISCONTINUED | OUTPATIENT
Start: 2019-01-01 | End: 2019-01-01 | Stop reason: HOSPADM

## 2019-01-01 RX ORDER — ONDANSETRON 4 MG/1
4 TABLET, FILM COATED ORAL EVERY 6 HOURS PRN
Status: DISCONTINUED | OUTPATIENT
Start: 2019-01-01 | End: 2019-01-01 | Stop reason: HOSPADM

## 2019-01-01 RX ORDER — PROMETHAZINE HYDROCHLORIDE AND CODEINE PHOSPHATE 6.25; 1 MG/5ML; MG/5ML
5 SYRUP ORAL EVERY 6 HOURS PRN
Qty: 118 ML | Refills: 0 | Status: SHIPPED | OUTPATIENT
Start: 2019-01-01 | End: 2019-01-01

## 2019-01-01 RX ORDER — MORPHINE SULFATE 10 MG/ML
6 INJECTION INTRAMUSCULAR; INTRAVENOUS; SUBCUTANEOUS
Status: DISCONTINUED | OUTPATIENT
Start: 2019-01-01 | End: 2019-01-01 | Stop reason: HOSPADM

## 2019-01-01 RX ORDER — PROMETHAZINE HYDROCHLORIDE AND CODEINE PHOSPHATE 6.25; 1 MG/5ML; MG/5ML
5 SYRUP ORAL EVERY 6 HOURS PRN
Qty: 118 ML | Refills: 0 | Status: SHIPPED | OUTPATIENT
Start: 2019-01-01 | End: 2019-01-01 | Stop reason: SDUPTHER

## 2019-01-01 RX ORDER — SENNA AND DOCUSATE SODIUM 50; 8.6 MG/1; MG/1
2 TABLET, FILM COATED ORAL 2 TIMES DAILY PRN
Status: DISCONTINUED | OUTPATIENT
Start: 2019-01-01 | End: 2019-01-01 | Stop reason: HOSPADM

## 2019-01-01 RX ORDER — MORPHINE SULFATE 20 MG/ML
5 SOLUTION ORAL
Status: DISCONTINUED | OUTPATIENT
Start: 2019-01-01 | End: 2019-01-01 | Stop reason: HOSPADM

## 2019-01-01 RX ORDER — HYDROMORPHONE HYDROCHLORIDE 1 MG/ML
1.5 INJECTION, SOLUTION INTRAMUSCULAR; INTRAVENOUS; SUBCUTANEOUS
Status: DISCONTINUED | OUTPATIENT
Start: 2019-01-01 | End: 2019-01-01 | Stop reason: HOSPADM

## 2019-01-01 RX ORDER — ESOMEPRAZOLE MAGNESIUM 40 MG/1
40 CAPSULE, DELAYED RELEASE ORAL
Qty: 30 CAPSULE | Refills: 3
Start: 2019-01-01 | End: 2019-01-01

## 2019-01-01 RX ORDER — ACETAMINOPHEN 650 MG/1
650 SUPPOSITORY RECTAL EVERY 4 HOURS PRN
Status: DISCONTINUED | OUTPATIENT
Start: 2019-01-01 | End: 2019-01-01 | Stop reason: HOSPADM

## 2019-01-01 RX ORDER — ACETAMINOPHEN 650 MG/1
650 SUPPOSITORY RECTAL EVERY 4 HOURS PRN
Status: DISCONTINUED | OUTPATIENT
Start: 2019-01-01 | End: 2019-01-01 | Stop reason: SDUPTHER

## 2019-01-01 RX ORDER — ASPIRIN 325 MG
TABLET ORAL AS NEEDED
Status: DISCONTINUED | OUTPATIENT
Start: 2019-01-01 | End: 2019-01-01 | Stop reason: HOSPADM

## 2019-01-01 RX ORDER — MECLIZINE HYDROCHLORIDE 25 MG/1
25 TABLET ORAL 3 TIMES DAILY PRN
Qty: 30 TABLET | Refills: 3 | Status: SHIPPED | OUTPATIENT
Start: 2019-01-01 | End: 2019-01-01

## 2019-01-01 RX ORDER — FENTANYL CITRATE 50 UG/ML
INJECTION, SOLUTION INTRAMUSCULAR; INTRAVENOUS AS NEEDED
Status: DISCONTINUED | OUTPATIENT
Start: 2019-01-01 | End: 2019-01-01 | Stop reason: HOSPADM

## 2019-01-01 RX ORDER — SODIUM CHLORIDE 9 MG/ML
100 INJECTION, SOLUTION INTRAVENOUS CONTINUOUS
Status: ACTIVE | OUTPATIENT
Start: 2019-01-01 | End: 2019-01-01

## 2019-01-01 RX ORDER — CHOLECALCIFEROL (VITAMIN D3) 50 MCG
2000 TABLET ORAL DAILY
Qty: 30 TABLET | Refills: 3 | Status: SHIPPED | OUTPATIENT
Start: 2019-01-01 | End: 2020-08-27

## 2019-01-01 RX ORDER — SODIUM CHLORIDE 0.9 % (FLUSH) 0.9 %
10 SYRINGE (ML) INJECTION EVERY 12 HOURS SCHEDULED
Status: DISCONTINUED | OUTPATIENT
Start: 2019-01-01 | End: 2019-01-01 | Stop reason: HOSPADM

## 2019-01-01 RX ORDER — LORAZEPAM 2 MG/ML
1 CONCENTRATE ORAL
Status: DISCONTINUED | OUTPATIENT
Start: 2019-01-01 | End: 2019-01-01 | Stop reason: SDUPTHER

## 2019-01-01 RX ORDER — LIDOCAINE HYDROCHLORIDE 20 MG/ML
5 SOLUTION OROPHARYNGEAL EVERY 4 HOURS PRN
Status: DISCONTINUED | OUTPATIENT
Start: 2019-01-01 | End: 2019-01-01 | Stop reason: HOSPADM

## 2019-01-01 RX ORDER — LORAZEPAM 2 MG/ML
0.5 INJECTION INTRAMUSCULAR
Status: DISCONTINUED | OUTPATIENT
Start: 2019-01-01 | End: 2019-01-01 | Stop reason: SDUPTHER

## 2019-01-01 RX ORDER — ACETAMINOPHEN 160 MG/5ML
650 SOLUTION ORAL EVERY 4 HOURS PRN
Status: DISCONTINUED | OUTPATIENT
Start: 2019-01-01 | End: 2019-01-01 | Stop reason: HOSPADM

## 2019-01-01 RX ORDER — DIPHENOXYLATE HYDROCHLORIDE AND ATROPINE SULFATE 2.5; .025 MG/1; MG/1
1 TABLET ORAL
Status: DISCONTINUED | OUTPATIENT
Start: 2019-01-01 | End: 2019-01-01 | Stop reason: HOSPADM

## 2019-01-01 RX ORDER — ASPIRIN 325 MG
325 TABLET ORAL ONCE
Status: DISCONTINUED | OUTPATIENT
Start: 2019-01-01 | End: 2019-01-01 | Stop reason: HOSPADM

## 2019-01-01 RX ORDER — ESOMEPRAZOLE MAGNESIUM 40 MG/1
40 CAPSULE, DELAYED RELEASE ORAL
Qty: 90 CAPSULE | Refills: 1 | Status: SHIPPED | OUTPATIENT
Start: 2019-01-01 | End: 2019-01-01 | Stop reason: SDUPTHER

## 2019-01-01 RX ORDER — LORAZEPAM 2 MG/ML
1 INJECTION INTRAMUSCULAR
Status: DISCONTINUED | OUTPATIENT
Start: 2019-01-01 | End: 2019-01-01 | Stop reason: SDUPTHER

## 2019-01-01 RX ORDER — LORAZEPAM 2 MG/ML
2 INJECTION INTRAMUSCULAR
Status: DISCONTINUED | OUTPATIENT
Start: 2019-01-01 | End: 2019-01-01 | Stop reason: HOSPADM

## 2019-01-01 RX ORDER — NABUMETONE 500 MG/1
500 TABLET, FILM COATED ORAL 2 TIMES DAILY PRN
Qty: 60 TABLET | Refills: 3 | Status: SHIPPED | OUTPATIENT
Start: 2019-01-01 | End: 2019-01-01 | Stop reason: ALTCHOICE

## 2019-01-01 RX ORDER — SCOLOPAMINE TRANSDERMAL SYSTEM 1 MG/1
1 PATCH, EXTENDED RELEASE TRANSDERMAL
Status: DISCONTINUED | OUTPATIENT
Start: 2019-01-01 | End: 2019-01-01 | Stop reason: HOSPADM

## 2019-01-01 RX ORDER — ASPIRIN 81 MG/1
81 TABLET ORAL DAILY
Qty: 90 TABLET | Refills: 3 | Status: SHIPPED | OUTPATIENT
Start: 2019-01-01

## 2019-01-01 RX ORDER — BISACODYL 5 MG/1
5 TABLET, DELAYED RELEASE ORAL DAILY PRN
Status: DISCONTINUED | OUTPATIENT
Start: 2019-01-01 | End: 2019-01-01 | Stop reason: HOSPADM

## 2019-01-01 RX ORDER — CLOPIDOGREL BISULFATE 75 MG/1
75 TABLET ORAL DAILY
Qty: 90 TABLET | Refills: 3 | Status: SHIPPED | OUTPATIENT
Start: 2019-01-01

## 2019-01-01 RX ORDER — HYDROMORPHONE HYDROCHLORIDE 1 MG/ML
1 INJECTION, SOLUTION INTRAMUSCULAR; INTRAVENOUS; SUBCUTANEOUS
Status: DISCONTINUED | OUTPATIENT
Start: 2019-01-01 | End: 2019-01-01 | Stop reason: HOSPADM

## 2019-01-01 RX ORDER — PANTOPRAZOLE SODIUM 40 MG/1
40 TABLET, DELAYED RELEASE ORAL EVERY MORNING
Status: DISCONTINUED | OUTPATIENT
Start: 2019-01-01 | End: 2019-01-01 | Stop reason: HOSPADM

## 2019-01-01 RX ORDER — METHYLPREDNISOLONE SODIUM SUCCINATE 125 MG/2ML
INJECTION, POWDER, LYOPHILIZED, FOR SOLUTION INTRAMUSCULAR; INTRAVENOUS AS NEEDED
Status: DISCONTINUED | OUTPATIENT
Start: 2019-01-01 | End: 2019-01-01 | Stop reason: HOSPADM

## 2019-01-01 RX ORDER — ASPIRIN 81 MG/1
81 TABLET ORAL DAILY
Status: DISCONTINUED | OUTPATIENT
Start: 2019-01-01 | End: 2019-01-01 | Stop reason: HOSPADM

## 2019-01-01 RX ORDER — LORAZEPAM 2 MG/ML
2 CONCENTRATE ORAL
Status: DISCONTINUED | OUTPATIENT
Start: 2019-01-01 | End: 2019-01-01 | Stop reason: SDUPTHER

## 2019-01-01 RX ORDER — CLOPIDOGREL BISULFATE 75 MG/1
300 TABLET ORAL ONCE
Status: COMPLETED | OUTPATIENT
Start: 2019-01-01 | End: 2019-01-01

## 2019-01-01 RX ORDER — MORPHINE SULFATE 2 MG/ML
4 INJECTION, SOLUTION INTRAMUSCULAR; INTRAVENOUS
Status: DISCONTINUED | OUTPATIENT
Start: 2019-01-01 | End: 2019-01-01 | Stop reason: HOSPADM

## 2019-01-01 RX ORDER — CLOPIDOGREL BISULFATE 75 MG/1
75 TABLET ORAL DAILY
Status: DISCONTINUED | OUTPATIENT
Start: 2019-01-01 | End: 2019-01-01 | Stop reason: HOSPADM

## 2019-01-01 RX ORDER — LIDOCAINE HYDROCHLORIDE 20 MG/ML
INJECTION, SOLUTION INFILTRATION; PERINEURAL AS NEEDED
Status: DISCONTINUED | OUTPATIENT
Start: 2019-01-01 | End: 2019-01-01 | Stop reason: HOSPADM

## 2019-01-01 RX ORDER — LORAZEPAM 2 MG/ML
0.5 INJECTION INTRAMUSCULAR
Status: DISCONTINUED | OUTPATIENT
Start: 2019-01-01 | End: 2019-01-01 | Stop reason: HOSPADM

## 2019-01-01 RX ORDER — MORPHINE SULFATE 2 MG/ML
2 INJECTION, SOLUTION INTRAMUSCULAR; INTRAVENOUS
Status: DISCONTINUED | OUTPATIENT
Start: 2019-01-01 | End: 2019-01-01 | Stop reason: HOSPADM

## 2019-01-01 RX ORDER — AMLODIPINE BESYLATE 2.5 MG/1
2.5 TABLET ORAL
Status: DISCONTINUED | OUTPATIENT
Start: 2019-01-01 | End: 2019-01-01 | Stop reason: HOSPADM

## 2019-01-01 RX ORDER — ROSUVASTATIN CALCIUM 5 MG/1
5 TABLET, COATED ORAL DAILY
Status: DISCONTINUED | OUTPATIENT
Start: 2019-01-01 | End: 2019-01-01 | Stop reason: HOSPADM

## 2019-01-01 RX ORDER — LORAZEPAM 2 MG/ML
0.5 CONCENTRATE ORAL
Status: DISCONTINUED | OUTPATIENT
Start: 2019-01-01 | End: 2019-01-01 | Stop reason: SDUPTHER

## 2019-01-01 RX ADMIN — LISINOPRIL 20 MG: 20 TABLET ORAL at 09:12

## 2019-01-01 RX ADMIN — HYDROMORPHONE HYDROCHLORIDE 0.5 MG: 1 INJECTION, SOLUTION INTRAMUSCULAR; INTRAVENOUS; SUBCUTANEOUS at 21:11

## 2019-01-01 RX ADMIN — ROSUVASTATIN CALCIUM 5 MG: 5 TABLET, FILM COATED ORAL at 14:07

## 2019-01-01 RX ADMIN — ROSUVASTATIN CALCIUM 5 MG: 5 TABLET, FILM COATED ORAL at 09:12

## 2019-01-01 RX ADMIN — SODIUM CHLORIDE 50 ML/HR: 9 INJECTION, SOLUTION INTRAVENOUS at 11:08

## 2019-01-01 RX ADMIN — SODIUM CHLORIDE, PRESERVATIVE FREE 10 ML: 5 INJECTION INTRAVENOUS at 20:01

## 2019-01-01 RX ADMIN — HYDROMORPHONE HYDROCHLORIDE 0.5 MG: 1 INJECTION, SOLUTION INTRAMUSCULAR; INTRAVENOUS; SUBCUTANEOUS at 00:32

## 2019-01-01 RX ADMIN — SCOPALAMINE 1 PATCH: 1 PATCH, EXTENDED RELEASE TRANSDERMAL at 17:13

## 2019-01-01 RX ADMIN — AMLODIPINE BESYLATE 2.5 MG: 2.5 TABLET ORAL at 12:03

## 2019-01-01 RX ADMIN — GLYCOPYRROLATE 0.4 MG: 0.2 INJECTION, SOLUTION INTRAMUSCULAR; INTRAVITREAL at 00:13

## 2019-01-01 RX ADMIN — ASPIRIN 81 MG: 81 TABLET, COATED ORAL at 09:12

## 2019-01-01 RX ADMIN — HYDROMORPHONE HYDROCHLORIDE 0.5 MG: 1 INJECTION, SOLUTION INTRAMUSCULAR; INTRAVENOUS; SUBCUTANEOUS at 15:27

## 2019-01-01 RX ADMIN — PANTOPRAZOLE SODIUM 40 MG: 40 TABLET, DELAYED RELEASE ORAL at 14:07

## 2019-01-01 RX ADMIN — CLOPIDOGREL 300 MG: 75 TABLET, FILM COATED ORAL at 07:22

## 2019-01-01 RX ADMIN — CYANOCOBALAMIN 1000 MCG: 1000 INJECTION, SOLUTION INTRAMUSCULAR; SUBCUTANEOUS at 17:25

## 2019-01-01 RX ADMIN — CLOPIDOGREL 75 MG: 75 TABLET, FILM COATED ORAL at 09:12

## 2019-01-01 RX ADMIN — ENOXAPARIN SODIUM 40 MG: 40 INJECTION SUBCUTANEOUS at 23:03

## 2019-01-01 RX ADMIN — CYANOCOBALAMIN 1000 MCG: 1000 INJECTION, SOLUTION INTRAMUSCULAR; SUBCUTANEOUS at 13:46

## 2019-01-01 RX ADMIN — PANTOPRAZOLE SODIUM 40 MG: 40 TABLET, DELAYED RELEASE ORAL at 06:44

## 2019-01-01 RX ADMIN — NITROGLYCERIN 0.4 MG: 0.4 TABLET, ORALLY DISINTEGRATING SUBLINGUAL at 06:14

## 2019-01-01 RX ADMIN — NITROGLYCERIN 10 MCG/MIN: 20 INJECTION INTRAVENOUS at 07:23

## 2019-01-01 RX ADMIN — LISINOPRIL 20 MG: 20 TABLET ORAL at 14:07

## 2019-01-02 NOTE — PROGRESS NOTES
Subjective .     REASONS FOR FOLLOWUP:   1.   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    2.  Status post left mastectomy with sentinel lymph node biopsy on December 7, 2017.  It is T2 N0 M0, stage IIA, ER positive TX positive HER-2/selwyn negative, grade 2, tumor size is 2.7×1.6 cm, invasive colloid carcinoma with 3 cm lymph nodes negative.  Focal ductal carcinoma in situ is present.. Given her age and on discussion with the side effects patient refuses tamoxifen, aromatase inhibitor, Evista.    3.  CT scan shows  small nodules in the lung in the right middle and lower lobe and 2 low-density lesions in the liver which could be followed.    History of Present Illness      The patient is a 89 y.o. female with the above-mentioned history, who returns today for toxicity check.  Last evaluated by Dr. Penaloza, was recommended she begin Evista.  The patient reports today, she took this medication for 2 weeks, then discontinued this due to significant arthralgias.  She reports developing generalized achiness soon after starting the medication.  She did discontinue approximately 2 weeks ago, her symptoms do persist.  She has not taken anything.  The patient is quite hesitant to take any medications.  She remains active.  She reports she is eating and drinking adequately.  She does continue on her calcium and vitamin D supplementation.    Past Medical History:   Diagnosis Date   • Anemia    • Arthritis    • Breast cancer (CMS/HCC)     LEFT   • Colon polyp 04/13/2012    HEPATIC FLEXURE: Tubular Adenoma   • Colon polyps 05/23/2017    RT COLON POLYP: Hyperplastic; SIMOID POLYP: Tubular Adenoma   • Diabetes mellitus (CMS/HCC)     DIET CONTROLLED   • Diverticulosis    • GERD (gastroesophageal reflux disease)    • H/O Cataract    • Hiatal hernia    • History of diverticulitis    • History of hepatitis 1958   • History of kidney stone     • History of peptic ulcer    • History of vertigo    • Hyperlipidemia    • Hypertension    • Inguinal hernia    • Migraine    • Rheumatoid arthritis (CMS/HCC)    • Sciatic leg pain    • Scoliosis    • Unexplained weight loss     #43 lb in 3 months    • Visual impairment        ONCOLOGIC HISTORY:  (History from previous dates can be found in the separate document.)  patient is a 89-year-old female who has history of hypertension and high cholesterol, history of peptic ulcer disease in the past on  Nexium, was found to have a mass in the left breast for more than a year.  She states that it was a small lesion which gradually increased in size.  She thought it was nothing.  She has a 96-year-old sister who lives with her and had some calcifications which on biopsy were negative.  She thought her lesion in the breast will resolve.  More recently she had pain in the left lower extremity coming from the back as a result she went to Dr. Rock.  He did plain x-rays of the lumbar spine and left knee and at the same time she told him that she had this breast mass.  She then underwent a mammogram.  A bilateral diagnostic mammogram was done.  This showed the left nipple was retracted.  In the retroareolar region of the left breast at the 12 o'clock position there was an irregular 3.1 cm mass.  Left breast skin thickening is also noted.  No evidence for axillary adenopathy is seen in either breast.  No suspicious findings were seen in the right breast.     Ultrasound of the left breast showed that at 12 o'clock position there was an irregular 2.5 cm heterogeneous mass.  No evidence of left axillary adenopathy was appreciated.  This was done on September 13, 2017.  Subsequently patient underwent ultrasound-guided biopsy on September 26, 2017.  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  It is intermediate grade, Woodbridge score is 6.  Invasive carcinoma  involves multiple core biopsy fragments spanning at least 10 mm.  Estrogen receptors greater than 90%, progesterone receptor is 40%, HER-2/selwyn is 2+ by immunohistochemistry, HER-2 copy number is 2.2 with a HER-2 CEP ratio of 1.1, negative.  She has been referred here for further options of treatment.     Patient states that she has a cousin, who is her dad's twin brother's daughter who developed breast cancer at age 70.  Her mother had uterine cancer at age 87 and  from it.  She also thinks she has several cousins with breast cancer but they were older but she's unsure how old.     Patient is very active at home.  She cooks, does yard work, she removes weeds, trims  her shrubs.     She has vertigo when she turns towards the left.  This thought to be positional vertigo but she has not been evaluated by ENT.  She had left leg pain and back pain which is now resolved.     Patient had mild increase in calcium at 10.2.  She underwent a PTH level which was slightly elevated at 74.  She has been referred to endocrinology.  Her calcium today though is down to 9.8.  She also had a very low 25-hydroxy vitamin D level at 7.7 and has been placed on 50,000 units of vitamin D3 every weekly.  She has not been taking it since September  Patient underwent left mastectomy and left axillary sentinel lymph node biopsy on 2017.    Pathology shows invasive mammary carcinoma with mucinous features which is 2.7×2×1.6 cm in size.  It is grade 2, Shaq score of 6 out of 9.  It's a single focus of invasive carcinoma.  Ductal carcinoma in situ is focally present.  Which is grade 2 as well.  No evidence of lobular carcinoma in situ.  Margins are uninvolved by invasive carcinoma and by DCIS.  Closest margin from invasive carcinoma is less than 1 mm in the deep margin.  Distance of ductal carcinoma in situ from closest margin is 10 mm.  All additional margins are greater than 10 mm from invasive carcinoma and from DCIS.   Total of number of lymph nodes examined is 3 and none of them are involved.  It is a T2 N0 M0 invasive mammary carcinoma.    Patient not a candidate for chemotherapy given her age.  Also refused hormonal therapy.    Current Outpatient Medications on File Prior to Visit   Medication Sig Dispense Refill   • amLODIPine (NORVASC) 5 MG tablet Take 5 mg by mouth Daily As Needed (IF BP IS OVER 150/90).     • Cholecalciferol (VITAMIN D3) 5000 units capsule capsule Take 5,000 Units by mouth Daily.     • esomeprazole (nexIUM) 40 MG capsule Take 40 mg by mouth Every Morning Before Breakfast.     • glucose blood (ONE TOUCH ULTRA TEST) test strip Use to test BS Once Daily  DX code: E11.9 50 each 2   • Lancets (ONETOUCH ULTRASOFT) lancets USE TO TEST BLOOD SUGAR ONCE DAILY 100 each 1   • lisinopril (PRINIVIL,ZESTRIL) 20 MG tablet TAKE ONE TABLET BY MOUTH DAILY 30 tablet 1   • ONE TOUCH ULTRA TEST test strip USE TO TEST BLOOD GLUCOSE DAILY 50 each 0   • polyethylene glycol (MIRALAX) packet Take 17 g by mouth Daily. 10 each 0   • rosuvastatin (CRESTOR) 5 MG tablet Take 5 mg by mouth Daily.     • benzonatate (TESSALON) 100 MG capsule Take 100 mg by mouth 3 (Three) Times a Day As Needed for Cough.     • raloxifene (EVISTA) 60 MG tablet Take 1 tablet by mouth Daily. 30 tablet 6     Current Facility-Administered Medications on File Prior to Visit   Medication Dose Route Frequency Provider Last Rate Last Dose   • cyanocobalamin injection 1,000 mcg  1,000 mcg Intramuscular Q28 Days George Rock MD   1,000 mcg at 12/11/18 1412       ALLERGIES:     Allergies   Allergen Reactions   • Contrast Dye Other (See Comments)     PASS OUT   • Novocain [Procaine] Palpitations and Paresthesia     LEG GET NUMB   • Aleve [Naproxen Sodium] GI Intolerance   • Cortizone-10 [Hydrocortisone] Other (See Comments)     UNSURE   • Eye Drops [Naphazoline-Polyethyl Glycol] Other (See Comments)     UNSURE   • Lipitor [Atorvastatin] Other (See Comments)      UNSURE   • Sulfur Other (See Comments)     UNSURE   • Valium [Diazepam] Other (See Comments)     Doesn't like the way it makes her feel   • Zocor [Simvastatin] Other (See Comments)     UNSURE   • Zyrtec [Cetirizine] Other (See Comments)     UNSURE     OB/GYN history: Patient started menstrual period at age 14.  She had menopause at age 55.  She has not had any children and she has not had any miscarriages.     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  She retired at age 63 in .  She never smoked.  She never did alcohol.  She is .  She now lives with her 96-year-old sister.  She is very active.     Family history: Father  of an accident at age 56.  Mother  of uterine cancer at age 87.  She had a brother who  of lung cancer at age 61.  She has a brother with heart attack at age 46.  She lives with her sister was 96-year-old.  Her father's brother's daughter had breast cancer.  At age 70.  She states that several of her cousins had breast cancer.  But they were all older.         Cancer-related family history includes Lung cancer in her brother; Uterine cancer in her mother.     I have reviewed the patient's medical history in detail and updated the computerized patient record.    Review of Systems   Constitutional: Negative for chills, fatigue and fever.   HENT: Negative for mouth sores and sore throat.    Eyes: Negative for visual disturbance.   Respiratory: Negative for cough and shortness of breath.    Cardiovascular: Negative for chest pain and leg swelling.   Gastrointestinal: Negative for abdominal pain, diarrhea, nausea and vomiting.   Genitourinary: Negative for dysuria and frequency.   Musculoskeletal: Positive for arthralgias and myalgias.   Neurological: Negative for dizziness and weakness.   Hematological: Does not bruise/bleed easily.   Psychiatric/Behavioral: The  "patient is not nervous/anxious.    All other systems reviewed and are negative.  Review of systems unchanged from previous office visit except as updated.     Objective      Vitals:    01/02/19 1335 01/02/19 1410   BP: (!) 184/68 168/68   Pulse: 53    Resp: 14    Temp: 97.5 °F (36.4 °C)    TempSrc: Oral    SpO2: 99%    Weight: 48 kg (105 lb 14.4 oz)    Height: 162.6 cm (64.02\")    PainSc:   8    PainLoc: Back      Current Status 1/2/2019   ECOG score 2       Physical Exam    GENERAL:  Well-developed, well-nourished in no acute distress.   SKIN:  Warm, dry without rashes, purpura or petechiae.  EYES:  Pupils equal, round and reactive to light.  EOMs intact.  Conjunctivae normal.  EARS:  Hearing intact.  NOSE:  Septum midline.  No excoriations or nasal discharge.  LYMPHATICS:  No cervical, supraclavicular adenopathy.  CHEST:  Lungs clear to auscultation. Good airflow.  Breasts: She is status post left mastectomy.  The surgical scar is healed up.  There is no evidence of any left axillary adenopathy or left supraclavicular adenopathy.  Right breast: No evidence of any breast mass, no nipple retraction, no right axillary adenopathy, no right supraclavicular adenopathy, no skin changes. Breasts not examined today, 1/2/2019.  CARDIAC:  Regular rate and rhythm without murmurs, rubs or gallops. Normal S1,S2.  EXTREMITIES:  No clubbing, cyanosis or edema.  NEUROLOGICAL:  Cranial Nerves II-XII grossly intact.  No focal neurological deficits.  PSYCHIATRIC:  Normal affect and mood.    Physical exam unchanged from previous office visit except.    RECENT LABS:  Results from last 7 days   Lab Units  01/02/19   1341   WBC 10*3/mm3  7.25   NEUTROS ABS 10*3/mm3  5.12   HEMOGLOBIN g/dL  13.0   HEMATOCRIT %  39.2   PLATELETS 10*3/mm3  303             Assessment/Plan     1.  Left breast mass,T2 N0, stage IIa,   estrogen receptor greater than 90%, progesterone receptor 40%, HER-2/selwyn 2+ by immunohistochemistry and Is 2.2 with a HER-2/CEP " ratio of 1.1 which is negative by fish. 2. Status post left mastectomy with sentinel lymph node biopsy.  Pathology shows T2 N0 M0 invasive colloid carcinoma, grade 2 with focal ductal carcinoma in situ at surgery.  3 sentinel lymph nodes are negative.  She has good margins.  She does not require any radiation. Mammogram  September 14, 2018 negative   The patient is not thought to be a candidate for chemotherapy given her age and medical comorbidities.  She declined hormonal therapy.  Patient was agreeable to try Evista.  However, after only 2 weeks of this medication she developed significant arthralgias, therefore has discontinued this.    2.  Family history of her cousin having had breast cancer at age 70.  No first-degree relative with breast cancer.  However she states several of her cousins have had breast cancer all when they were older.  Patient does not have any children of her own and does not want any genetic counseling.     3.  Severe vitamin D deficiency for which she continues on vitamin D 50,000 units by mouth once weekly.      4.  CT scan showing lung nodules and 2 low-density lesions in the liver which can be followed by CT scans, repeat CT November 2018 negative    5.  Significant arthralgias secondary to Evista. Therefore discontinued     Plan:  1. We discussed NSAIDs for arthralgias.  2. Continue calcium and vitamin D supplementation.  3. M.D. follow-up February 2019.    Minerva Castellanos, APRN   01/02/2019

## 2019-02-12 NOTE — PROGRESS NOTES
Subjective .     REASONS FOR FOLLOWUP:   1.   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    2.  Status post left mastectomy with sentinel lymph node biopsy on December 7, 2017.  It is T2 N0 M0, stage IIA, ER positive CA positive HER-2/selwyn negative, grade 2, tumor size is 2.7×1.6 cm, invasive colloid carcinoma with 3 cm lymph nodes negative.  Focal ductal carcinoma in situ is present.. Given her age and on discussion with the side effects patient refuses tamoxifen, aromatase inhibitor, Evista.    3.  CT scan shows  small nodules in the lung in the right middle and lower lobe and 2 low-density lesions in the liver which could be followed.    History of Present Illness      The patient is a 89 y.o. female with the above-mentioned history, who returns today for toxicity check.  Last evaluated by Dr. Penaloza, was recommended she begin Evista.  The patient reports today, she took this medication for 2 weeks, then discontinued this due to significant arthralgias.  She reports developing generalized achiness soon after starting the medication.  She did discontinue approximately 2 weeks ago, her symptoms do persist.  She has not taken anything.  The patient is quite hesitant to take any medications.  She remains active.  She reports she is eating and drinking adequately.  She does continue on her calcium and vitamin D supplementation.    Interval history: Patient denies any complaints    Past Medical History:   Diagnosis Date   • Anemia    • Arthritis    • Breast cancer (CMS/HCC)     LEFT   • Colon polyp 04/13/2012    HEPATIC FLEXURE: Tubular Adenoma   • Colon polyps 05/23/2017    RT COLON POLYP: Hyperplastic; SIMOID POLYP: Tubular Adenoma   • Diabetes mellitus (CMS/HCC)     DIET CONTROLLED   • Diverticulosis    • GERD (gastroesophageal reflux disease)    • H/O Cataract    • Hiatal hernia    • History of diverticulitis    •  History of hepatitis 1958   • History of kidney stone    • History of peptic ulcer    • History of vertigo    • Hyperlipidemia    • Hypertension    • Inguinal hernia    • Migraine    • Rheumatoid arthritis (CMS/HCC)    • Sciatic leg pain    • Scoliosis    • Unexplained weight loss     #43 lb in 3 months    • Visual impairment        ONCOLOGIC HISTORY:  (History from previous dates can be found in the separate document.)  patient is a 89-year-old female who has history of hypertension and high cholesterol, history of peptic ulcer disease in the past on  Nexium, was found to have a mass in the left breast for more than a year.  She states that it was a small lesion which gradually increased in size.  She thought it was nothing.  She has a 96-year-old sister who lives with her and had some calcifications which on biopsy were negative.  She thought her lesion in the breast will resolve.  More recently she had pain in the left lower extremity coming from the back as a result she went to Dr. Rock.  He did plain x-rays of the lumbar spine and left knee and at the same time she told him that she had this breast mass.  She then underwent a mammogram.  A bilateral diagnostic mammogram was done.  This showed the left nipple was retracted.  In the retroareolar region of the left breast at the 12 o'clock position there was an irregular 3.1 cm mass.  Left breast skin thickening is also noted.  No evidence for axillary adenopathy is seen in either breast.  No suspicious findings were seen in the right breast.     Ultrasound of the left breast showed that at 12 o'clock position there was an irregular 2.5 cm heterogeneous mass.  No evidence of left axillary adenopathy was appreciated.  This was done on September 13, 2017.  Subsequently patient underwent ultrasound-guided biopsy on September 26, 2017.  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  It is intermediate  grade, Shaq score is 6.  Invasive carcinoma involves multiple core biopsy fragments spanning at least 10 mm.  Estrogen receptors greater than 90%, progesterone receptor is 40%, HER-2/selwyn is 2+ by immunohistochemistry, HER-2 copy number is 2.2 with a HER-2 CEP ratio of 1.1, negative.  She has been referred here for further options of treatment.     Patient states that she has a cousin, who is her dad's twin brother's daughter who developed breast cancer at age 70.  Her mother had uterine cancer at age 87 and  from it.  She also thinks she has several cousins with breast cancer but they were older but she's unsure how old.     Patient is very active at home.  She cooks, does yard work, she removes weeds, trims  her shrubs.     She has vertigo when she turns towards the left.  This thought to be positional vertigo but she has not been evaluated by ENT.  She had left leg pain and back pain which is now resolved.     Patient had mild increase in calcium at 10.2.  She underwent a PTH level which was slightly elevated at 74.  She has been referred to endocrinology.  Her calcium today though is down to 9.8.  She also had a very low 25-hydroxy vitamin D level at 7.7 and has been placed on 50,000 units of vitamin D3 every weekly.  She has not been taking it since September  Patient underwent left mastectomy and left axillary sentinel lymph node biopsy on 2017.    Pathology shows invasive mammary carcinoma with mucinous features which is 2.7×2×1.6 cm in size.  It is grade 2, Montello score of 6 out of 9.  It's a single focus of invasive carcinoma.  Ductal carcinoma in situ is focally present.  Which is grade 2 as well.  No evidence of lobular carcinoma in situ.  Margins are uninvolved by invasive carcinoma and by DCIS.  Closest margin from invasive carcinoma is less than 1 mm in the deep margin.  Distance of ductal carcinoma in situ from closest margin is 10 mm.  All additional margins are greater than  10 mm from invasive carcinoma and from DCIS.  Total of number of lymph nodes examined is 3 and none of them are involved.  It is a T2 N0 M0 invasive mammary carcinoma.    Patient not a candidate for chemotherapy given her age.  Also refused hormonal therapy.    Current Outpatient Medications on File Prior to Visit   Medication Sig Dispense Refill   • amLODIPine (NORVASC) 5 MG tablet Take 5 mg by mouth Daily As Needed (IF BP IS OVER 150/90).     • Cholecalciferol (VITAMIN D3) 5000 units capsule capsule Take 5,000 Units by mouth Daily.     • esomeprazole (nexIUM) 40 MG capsule Take 40 mg by mouth Every Morning Before Breakfast.     • glucose blood (ONE TOUCH ULTRA TEST) test strip Use to test BS Once Daily  DX code: E11.9 50 each 2   • Lancets (ONETOUCH ULTRASOFT) lancets USE TO TEST BLOOD SUGAR ONCE DAILY 100 each 1   • lisinopril (PRINIVIL,ZESTRIL) 20 MG tablet TAKE ONE TABLET BY MOUTH DAILY 30 tablet 1   • ONE TOUCH ULTRA TEST test strip USE TO TEST BLOOD GLUCOSE DAILY 50 each 0   • polyethylene glycol (MIRALAX) packet Take 17 g by mouth Daily. 10 each 0   • rosuvastatin (CRESTOR) 5 MG tablet Take 5 mg by mouth Daily.       Current Facility-Administered Medications on File Prior to Visit   Medication Dose Route Frequency Provider Last Rate Last Dose   • cyanocobalamin injection 1,000 mcg  1,000 mcg Intramuscular Q28 Days George Rock MD   1,000 mcg at 12/11/18 1412       ALLERGIES:     Allergies   Allergen Reactions   • Contrast Dye Other (See Comments)     PASS OUT   • Novocain [Procaine] Palpitations and Paresthesia     LEG GET NUMB   • Aleve [Naproxen Sodium] GI Intolerance   • Cortizone-10 [Hydrocortisone] Other (See Comments)     UNSURE   • Eye Drops [Naphazoline-Polyethyl Glycol] Other (See Comments)     UNSURE   • Lipitor [Atorvastatin] Other (See Comments)     UNSURE   • Sulfur Other (See Comments)     UNSURE   • Valium [Diazepam] Other (See Comments)     Doesn't like the way it makes her feel   •  Zocor [Simvastatin] Other (See Comments)     UNSURE   • Zyrtec [Cetirizine] Other (See Comments)     UNSURE     OB/GYN history: Patient started menstrual period at age 14.  She had menopause at age 55.  She has not had any children and she has not had any miscarriages.     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  She retired at age 63 in .  She never smoked.  She never did alcohol.  She is .  She now lives with her 96-year-old sister.  She is very active.     Family history: Father  of an accident at age 56.  Mother  of uterine cancer at age 87.  She had a brother who  of lung cancer at age 61.  She has a brother with heart attack at age 46.  She lives with her sister was 96-year-old.  Her father's brother's daughter had breast cancer.  At age 70.  She states that several of her cousins had breast cancer.  But they were all older.         Cancer-related family history includes Lung cancer in her brother; Uterine cancer in her mother.     I have reviewed the patient's medical history in detail and updated the computerized patient record.    Review of Systems   Constitutional: Negative for chills, fatigue and fever.   HENT: Negative for mouth sores and sore throat.    Eyes: Negative for visual disturbance.   Respiratory: Negative for cough and shortness of breath.    Cardiovascular: Negative for chest pain and leg swelling.   Gastrointestinal: Negative for abdominal pain, diarrhea, nausea and vomiting.   Genitourinary: Negative for dysuria and frequency.   Musculoskeletal: Positive for arthralgias and myalgias.   Neurological: Negative for dizziness and weakness.   Hematological: Does not bruise/bleed easily.   Psychiatric/Behavioral: The patient is not nervous/anxious.    All other systems reviewed and are negative.  Review of systems unchanged from previous office visit  "except as updated.     Objective      Vitals:    02/12/19 1516   BP: (!) 194/82  Comment: White coat syndrome per patient   Pulse: 56   Resp: 16   Temp: 97.5 °F (36.4 °C)   TempSrc: Oral   SpO2: 98%   Weight: 48.6 kg (107 lb 1.6 oz)   Height: 162.6 cm (64.02\")   PainSc: 0-No pain     Current Status 2/12/2019   ECOG score 0       Physical Exam    GENERAL:  Well-developed, well-nourished in no acute distress.   SKIN:  Warm, dry without rashes, purpura or petechiae.  EYES:  Pupils equal, round and reactive to light.  EOMs intact.  Conjunctivae normal.  EARS:  Hearing intact.  NOSE:  Septum midline.  No excoriations or nasal discharge.  LYMPHATICS:  No cervical, supraclavicular adenopathy.  CHEST:  Lungs clear to auscultation. Good airflow.  Breasts: She is status post left mastectomy.  The surgical scar is healed up.  There is no evidence of any left axillary adenopathy or left supraclavicular adenopathy.  Right breast: No evidence of any breast mass, no nipple retraction, no right axillary adenopathy, no right supraclavicular adenopathy, no skin changes. Breasts not examined today, 1/2/2019.  CARDIAC:  Regular rate and rhythm without murmurs, rubs or gallops. Normal S1,S2.  EXTREMITIES:  No clubbing, cyanosis or edema.  NEUROLOGICAL:  Cranial Nerves II-XII grossly intact.  No focal neurological deficits.  PSYCHIATRIC:  Normal affect and mood.    Physical exam unchanged from previous office visit except.    RECENT LABS:  Results from last 7 days   Lab Units 02/12/19  1520   WBC 10*3/mm3 7.14   NEUTROS ABS 10*3/mm3 4.33   HEMOGLOBIN g/dL 12.3   HEMATOCRIT % 38.0   PLATELETS 10*3/mm3 279     Results from last 7 days   Lab Units 02/12/19  1520   SODIUM mmol/L 141   POTASSIUM mmol/L 4.3   CHLORIDE mmol/L 102   CO2 mmol/L 29.9*   BUN mg/dL 14   CREATININE mg/dL 0.64   CALCIUM mg/dL 9.9   ALBUMIN g/dL 4.40   BILIRUBIN mg/dL 0.4   ALK PHOS U/L 72   ALT (SGPT) U/L 12   AST (SGOT) U/L 16   GLUCOSE mg/dL 99       "   Assessment/Plan     1.  Left breast mass,T2 N0, stage IIa,   estrogen receptor greater than 90%, progesterone receptor 40%, HER-2/selwyn 2+ by immunohistochemistry and Is 2.2 with a HER-2/CEP ratio of 1.1 which is negative by fish. 2. Status post left mastectomy with sentinel lymph node biopsy.  Pathology shows T2 N0 M0 invasive colloid carcinoma, grade 2 with focal ductal carcinoma in situ at surgery.  3 sentinel lymph nodes are negative.  She has good margins.  She does not require any radiation. Mammogram  September 14, 2018 negative   The patient is not thought to be a candidate for chemotherapy given her age and medical comorbidities.  She declined hormonal therapy.  Patient was agreeable to try Evista.  However, after only 2 weeks of this medication she developed significant arthralgias, therefore has discontinued this.  · Mammogram done September 2018 is negative.    2.  Family history of her cousin having had breast cancer at age 70.  No first-degree relative with breast cancer.  However she states several of her cousins have had breast cancer all when they were older.  Patient does not have any children of her own and does not want any genetic counseling.     3.  Severe vitamin D deficiency for which she continues on vitamin D 50,000 units by mouth once weekly.      4.  CT scan showing lung nodules and 2 low-density lesions in the liver which can be followed by CT scans, repeat CT November 2018 negative    5.  Significant arthralgias secondary to Evista. Therefore discontinued     Plan:  1. Continue calcium and vitamin D supplementation.  2. M.D. follow-up labs.  No evidence of disease..    Milly Penaloza MD   01/02/2019

## 2019-03-28 NOTE — PROGRESS NOTES
Subjective   Cristal Sams is a 89 y.o. female.     History of Present Illness   Patient was seen for hypertension.  Pressures been running 130s over 80s at home.  Her lipids have been controlled with diet exercise and a statin.  Her latest hemoglobin A1c was 5.2%.  She did have acute pain in her left ankle is been going on for approximately 1 week.  There is been no known injury and x-ray was negative.  Patient was advised to have it rested ice pack and anti-inflammatories.    X-Ray  Interpretation report in house X-rays that I personally viewed    Relevant Clinical Issues/Diagnoses/Indications: Left ankle pain left ankle x-ray        Clinical Findings: No acute findings          Comparative Data: No previous x-ray          Date of Previous X-ray:    Change on current X-ray:    Dictated utilizing Dragon dictation. If there are questions or for further clarification, please contact me.    The following portions of the patient's history were reviewed and updated as appropriate: allergies, current medications, past family history, past medical history, past social history, past surgical history and problem list.    Review of Systems   Constitutional: Negative for fatigue and fever.   HENT: Positive for congestion. Negative for trouble swallowing.    Eyes: Negative for discharge and visual disturbance.   Respiratory: Negative for choking and shortness of breath.    Cardiovascular: Negative for chest pain and palpitations.   Gastrointestinal: Negative for abdominal pain and blood in stool.   Endocrine: Negative.    Genitourinary: Negative for genital sores and hematuria.   Musculoskeletal: Negative for gait problem and joint swelling.        Left ankle pain   Skin: Negative for color change, pallor, rash and wound.   Allergic/Immunologic: Positive for environmental allergies. Negative for immunocompromised state.   Neurological: Negative for facial asymmetry and speech difficulty.   Psychiatric/Behavioral:  Negative for hallucinations and suicidal ideas.       Objective   Physical Exam   Constitutional: She is oriented to person, place, and time. She appears well-developed and well-nourished.   HENT:   Head: Normocephalic.   Eyes: Conjunctivae are normal. Pupils are equal, round, and reactive to light.   Neck: Normal range of motion. Neck supple.   Cardiovascular: Normal rate, regular rhythm and normal heart sounds.   Pulmonary/Chest: Effort normal and breath sounds normal.   Abdominal: Soft. Bowel sounds are normal.   Musculoskeletal: Normal range of motion. She exhibits edema and tenderness.   Neurological: She is alert and oriented to person, place, and time.   Skin: Skin is warm and dry.   Psychiatric: She has a normal mood and affect. Her behavior is normal. Judgment and thought content normal.   Nursing note and vitals reviewed.      Assessment/Plan   Problems Addressed this Visit        Cardiovascular and Mediastinum    Hyperlipemia    Relevant Orders    CBC & Differential    Comprehensive Metabolic Panel    Lipid Panel    Hemoglobin A1c    Vitamin D 25 Hydroxy    Essential hypertension - Primary    Relevant Orders    CBC & Differential    Comprehensive Metabolic Panel    Lipid Panel    Hemoglobin A1c    Vitamin D 25 Hydroxy       Endocrine    Type 2 diabetes mellitus without complication (CMS/[facility])    Relevant Orders    CBC & Differential    Comprehensive Metabolic Panel    Lipid Panel    Hemoglobin A1c    Vitamin D 25 Hydroxy      Other Visit Diagnoses     Vitamin D deficiency         Relevant Orders    Vitamin D 25 Hydroxy    Low serum vitamin B12        Relevant Orders    Vitamin B12 & Folate    Acute left ankle pain        Relevant Orders    XR Ankle 2 View Left (Completed)

## 2019-03-28 NOTE — PATIENT INSTRUCTIONS
Ankle Sprain  An ankle sprain is a stretch or tear in one of the tough tissues (ligaments) that connect the bones in your ankle. An ankle sprain can happen when the ankle rolls outward (inversion sprain) or inward (eversion sprain).  Follow these instructions at home:  · Rest your ankle.  · Take over-the-counter and prescription medicines only as told by your doctor.  · For 2-3 days, keep your ankle higher than the level of your heart (elevated) as much as possible.  · If directed, put ice on the area:  ? Put ice in a plastic bag.  ? Place a towel between your skin and the bag.  ? Leave the ice on for 20 minutes, 2-3 times a day.  · If you were given a brace:  ? Wear it as told.  ? Take it off to shower or bathe.  ? Try not to move your ankle much, but wiggle your toes from time to time. This helps to prevent swelling.  · If you were given an elastic bandage (dressing):  ? Take it off when you shower or bathe.  ? Try not to move your ankle much, but wiggle your toes from time to time. This helps to prevent swelling.  ? Adjust the bandage to make it more comfortable if it feels too tight.  ? Loosen the bandage if you lose feeling in your foot, your foot tingles, or your foot gets cold and blue.  · If you have crutches, use them as told by your doctor.  Contact a doctor if:  · Your bruises or swelling are quickly getting worse.  · Your pain does not get better after you take medicine.  Get help right away if:  · You cannot feel your toes or foot.  · Your foot or toes look blue.  · You have very bad pain that gets worse.  Summary  · An ankle sprain is a stretch or tear in one of the tough tissues (ligaments) that connect the bones in your ankle.  · To relieve pain and swelling, place ice on the affected ankle, raise your ankle above the level of your heart, and use an elastic bandage. Also, rest as told by your health care provider.  This information is not intended to replace advice given to you by your health care  "provider. Make sure you discuss any questions you have with your health care provider.  Document Released: 06/05/2009 Document Revised: 06/15/2018 Document Reviewed: 07/19/2016  [employer] Interactive Patient Education © 2019 [employer]  Low Back Sprain Rehab  Ask your health care provider which exercises are safe for you. Do exercises exactly as told by your health care provider and adjust them as directed. It is normal to feel mild stretching, pulling, tightness, or discomfort as you do these exercises, but you should stop right away if you feel sudden pain or your pain gets worse. Do not begin these exercises until told by your health care provider.  Stretching and range of motion exercises  These exercises warm up your muscles and joints and improve the movement and flexibility of your back. These exercises also help to relieve pain, numbness, and tingling.  Exercise A: Lumbar rotation    1. Lie on your back on a firm surface and bend your knees.  2. Straighten your arms out to your sides so each arm forms an \"L\" shape with a side of your body (a 90 degree angle).  3. Slowly move both of your knees to one side of your body until you feel a stretch in your lower back. Try not to let your shoulders move off of the floor.  4. Hold for __________ seconds.  5. Tense your abdominal muscles and slowly move your knees back to the starting position.  6. Repeat this exercise on the other side of your body.  Repeat __________ times. Complete this exercise __________ times a day.  Exercise B: Prone extension on elbows    1. Lie on your abdomen on a firm surface.  2. Prop yourself up on your elbows.  3. Use your arms to help lift your chest up until you feel a gentle stretch in your abdomen and your lower back.  ? This will place some of your body weight on your elbows. If this is uncomfortable, try stacking pillows under your chest.  ? Your hips should stay down, against the surface that you are lying on. Keep your hip and " back muscles relaxed.  4. Hold for __________ seconds.  5. Slowly relax your upper body and return to the starting position.  Repeat __________ times. Complete this exercise __________ times a day.  Strengthening exercises  These exercises build strength and endurance in your back. Endurance is the ability to use your muscles for a long time, even after they get tired.  Exercise C: Pelvic tilt  1. Lie on your back on a firm surface. Bend your knees and keep your feet flat.  2. Tense your abdominal muscles. Tip your pelvis up toward the ceiling and flatten your lower back into the floor.  ? To help with this exercise, you may place a small towel under your lower back and try to push your back into the towel.  3. Hold for __________ seconds.  4. Let your muscles relax completely before you repeat this exercise.  Repeat __________ times. Complete this exercise __________ times a day.  Exercise D: Alternating arm and leg raises    1. Get on your hands and knees on a firm surface. If you are on a hard floor, you may want to use padding to cushion your knees, such as an exercise mat.  2. Line up your arms and legs. Your hands should be below your shoulders, and your knees should be below your hips.  3. Lift your left leg behind you. At the same time, raise your right arm and straighten it in front of you.  ? Do not lift your leg higher than your hip.  ? Do not lift your arm higher than your shoulder.  ? Keep your abdominal and back muscles tight.  ? Keep your hips facing the ground.  ? Do not arch your back.  ? Keep your balance carefully, and do not hold your breath.  4. Hold for __________ seconds.  5. Slowly return to the starting position and repeat with your right leg and your left arm.  Repeat __________ times. Complete this exercise __________ times a day.  Exercise E: Abdominal set with straight leg raise    1. Lie on your back on a firm surface.  2. Bend one of your knees and keep your other leg  straight.  3. Tense your abdominal muscles and lift your straight leg up, 4-6 inches (10-15 cm) off the ground.  4. Keep your abdominal muscles tight and hold for __________ seconds.  ? Do not hold your breath.  ? Do not arch your back. Keep it flat against the ground.  5. Keep your abdominal muscles tense as you slowly lower your leg back to the starting position.  6. Repeat with your other leg.  Repeat __________ times. Complete this exercise __________ times a day.  Posture and body mechanics    Body mechanics refers to the movements and positions of your body while you do your daily activities. Posture is part of body mechanics. Good posture and healthy body mechanics can help to relieve stress in your body's tissues and joints. Good posture means that your spine is in its natural S-curve position (your spine is neutral), your shoulders are pulled back slightly, and your head is not tipped forward. The following are general guidelines for applying improved posture and body mechanics to your everyday activities.  Standing    · When standing, keep your spine neutral and your feet about hip-width apart. Keep a slight bend in your knees. Your ears, shoulders, and hips should line up.  · When you do a task in which you  one place for a long time, place one foot up on a stable object that is 2-4 inches (5-10 cm) high, such as a footstool. This helps keep your spine neutral.  Sitting    · When sitting, keep your spine neutral and keep your feet flat on the floor. Use a footrest, if necessary, and keep your thighs parallel to the floor. Avoid rounding your shoulders, and avoid tilting your head forward.  · When working at a desk or a computer, keep your desk at a height where your hands are slightly lower than your elbows. Slide your chair under your desk so you are close enough to maintain good posture.  · When working at a computer, place your monitor at a height where you are looking straight ahead and you do  not have to tilt your head forward or downward to look at the screen.  Resting    · When lying down and resting, avoid positions that are most painful for you.  · If you have pain with activities such as sitting, bending, stooping, or squatting (flexion-based activities), lie in a position in which your body does not bend very much. For example, avoid curling up on your side with your arms and knees near your chest (fetal position).  · If you have pain with activities such as standing for a long time or reaching with your arms (extension-based activities), lie with your spine in a neutral position and bend your knees slightly. Try the following positions:  · Lying on your side with a pillow between your knees.  · Lying on your back with a pillow under your knees.  Lifting    · When lifting objects, keep your feet at least shoulder-width apart and tighten your abdominal muscles.  · Bend your knees and hips and keep your spine neutral. It is important to lift using the strength of your legs, not your back. Do not lock your knees straight out.  · Always ask for help to lift heavy or awkward objects.  This information is not intended to replace advice given to you by your health care provider. Make sure you discuss any questions you have with your health care provider.  Document Released: 12/18/2006 Document Revised: 08/24/2017 Document Reviewed: 09/28/2016  [employer] Interactive Patient Education © 2019 [employer]

## 2019-07-30 NOTE — PROGRESS NOTES
Subjective .     REASONS FOR FOLLOWUP:   1.  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    2.  Status post left mastectomy  with sentinel lymph node biopsy on December 7, 2017.  It is T2 N0 M0, stage IIA, ER positive CT positive HER-2/selwyn negative, grade 2, tumor size is 2.7×1.6 cm, invasive colloid carcinoma with 3 cm lymph nodes negative.  Focal ductal carcinoma in situ is present.. Given her age and on discussion with the side effects patient refuses tamoxifen, aromatase inhibitor, Evista.    3.  CT scan shows  small nodules in the lung in the right middle and lower lobe and 2 low-density lesions in the liver which could be followed.    History of Present Illness      The patient is a 89 y.o. female with the above mentioned history, who returns today for follow up visit.  She reports that she is doing well, other than some issues with her sciatic pain.  She denies any new palpable masses or nodularity.  She reports she is breathing well.  She has a good appetite.  Patient reports that she continues on calcium and vitamin D supplementation.    Patient also states that she has decided she does not want to have any further mammograms.    Past Medical History:   Diagnosis Date   • Anemia    • Arthritis    • Breast cancer (CMS/HCC)     LEFT   • Colon polyp 04/13/2012    HEPATIC FLEXURE: Tubular Adenoma   • Colon polyps 05/23/2017    RT COLON POLYP: Hyperplastic; SIMOID POLYP: Tubular Adenoma   • Diabetes mellitus (CMS/HCC)     DIET CONTROLLED   • Diverticulosis    • GERD (gastroesophageal reflux disease)    • H/O Cataract    • Hiatal hernia    • History of diverticulitis    • History of hepatitis 1958   • History of kidney stone    • History of peptic ulcer    • History of vertigo    • Hyperlipidemia    • Hypertension    • Inguinal hernia    • Migraine    • Rheumatoid arthritis (CMS/HCC)    • Sciatic leg pain    •  Scoliosis    • Unexplained weight loss     #43 lb in 3 months    • Visual impairment        ONCOLOGIC HISTORY:  (History from previous dates can be found in the separate document.)  patient is a 89-year-old female who has history of hypertension and high cholesterol, history of peptic ulcer disease in the past on  Nexium, was found to have a mass in the left breast for more than a year.  She states that it was a small lesion which gradually increased in size.  She thought it was nothing.  She has a 96-year-old sister who lives with her and had some calcifications which on biopsy were negative.  She thought her lesion in the breast will resolve.  More recently she had pain in the left lower extremity coming from the back as a result she went to Dr. Rock.  He did plain x-rays of the lumbar spine and left knee and at the same time she told him that she had this breast mass.  She then underwent a mammogram.  A bilateral diagnostic mammogram was done.  This showed the left nipple was retracted.  In the retroareolar region of the left breast at the 12 o'clock position there was an irregular 3.1 cm mass.  Left breast skin thickening is also noted.  No evidence for axillary adenopathy is seen in either breast.  No suspicious findings were seen in the right breast.     Ultrasound of the left breast showed that at 12 o'clock position there was an irregular 2.5 cm heterogeneous mass.  No evidence of left axillary adenopathy was appreciated.  This was done on September 13, 2017.  Subsequently patient underwent ultrasound-guided biopsy on September 26, 2017.  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  It is intermediate grade, Calipatria score is 6.  Invasive carcinoma involves multiple core biopsy fragments spanning at least 10 mm.  Estrogen receptors greater than 90%, progesterone receptor is 40%, HER-2/selwyn is 2+ by immunohistochemistry, HER-2 copy number  is 2.2 with a HER-2 CEP ratio of 1.1, negative.  She has been referred here for further options of treatment.     Patient states that she has a cousin, who is her dad's twin brother's daughter who developed breast cancer at age 70.  Her mother had uterine cancer at age 87 and  from it.  She also thinks she has several cousins with breast cancer but they were older but she's unsure how old.     Patient is very active at home.  She cooks, does yard work, she removes weeds, trims  her shrubs.     She has vertigo when she turns towards the left.  This thought to be positional vertigo but she has not been evaluated by ENT.  She had left leg pain and back pain which is now resolved.     Patient had mild increase in calcium at 10.2.  She underwent a PTH level which was slightly elevated at 74.  She has been referred to endocrinology.  Her calcium today though is down to 9.8.  She also had a very low 25-hydroxy vitamin D level at 7.7 and has been placed on 50,000 units of vitamin D3 every weekly.  She has not been taking it since September  Patient underwent left mastectomy and left axillary sentinel lymph node biopsy on 2017.    Pathology shows invasive mammary carcinoma with mucinous features which is 2.7×2×1.6 cm in size.  It is grade 2, Shaq score of 6 out of 9.  It's a single focus of invasive carcinoma.  Ductal carcinoma in situ is focally present.  Which is grade 2 as well.  No evidence of lobular carcinoma in situ.  Margins are uninvolved by invasive carcinoma and by DCIS.  Closest margin from invasive carcinoma is less than 1 mm in the deep margin.  Distance of ductal carcinoma in situ from closest margin is 10 mm.  All additional margins are greater than 10 mm from invasive carcinoma and from DCIS.  Total of number of lymph nodes examined is 3 and none of them are involved.  It is a T2 N0 M0 invasive mammary carcinoma.    Patient not a candidate for chemotherapy given her age.  Also refused  hormonal therapy.    Current Outpatient Medications on File Prior to Visit   Medication Sig Dispense Refill   • amLODIPine (NORVASC) 5 MG tablet Take 5 mg by mouth Daily As Needed (IF BP IS OVER 150/90).     • Cholecalciferol (VITAMIN D3) 5000 units capsule capsule Take 5,000 Units by mouth Daily.     • esomeprazole (nexIUM) 40 MG capsule Take 40 mg by mouth Every Morning Before Breakfast.     • glucose blood (ONE TOUCH ULTRA TEST) test strip Use to test BS Once Daily  DX code: E11.9 50 each 2   • Lancets (ONETOUCH ULTRASOFT) lancets USE TO TEST BLOOD SUGAR ONCE DAILY 100 each 3   • lisinopril (PRINIVIL,ZESTRIL) 20 MG tablet TAKE ONE TABLET BY MOUTH DAILY 30 tablet 1   • ONE TOUCH ULTRA TEST test strip USE TO TEST BLOOD GLUCOSE DAILY 50 each 0   • ONE TOUCH ULTRA TEST test strip USE ONE STRIP TO TEST DAILY 100 each 3   • rosuvastatin (CRESTOR) 5 MG tablet Take 5 mg by mouth Daily.     • [DISCONTINUED] benzonatate (TESSALON PERLES) 100 MG capsule Take 1 capsule by mouth 3 (Three) Times a Day As Needed for Cough. 30 capsule 3   • [DISCONTINUED] HYDROcodone-acetaminophen (NORCO) 5-325 MG per tablet Take 1 tablet by mouth Every 6 (Six) Hours As Needed for Moderate Pain . 30 tablet 0   • [DISCONTINUED] polyethylene glycol (MIRALAX) packet Take 17 g by mouth Daily. 10 each 0     No current facility-administered medications on file prior to visit.        ALLERGIES:     Allergies   Allergen Reactions   • Contrast Dye Other (See Comments)     PASS OUT   • Novocain [Procaine] Palpitations and Paresthesia     LEG GET NUMB   • Aleve [Naproxen Sodium] GI Intolerance   • Cortizone-10 [Hydrocortisone] Other (See Comments)     UNSURE   • Eye Drops [Naphazoline-Polyethyl Glycol] Other (See Comments)     UNSURE   • Lipitor [Atorvastatin] Other (See Comments)     UNSURE   • Sulfur Other (See Comments)     UNSURE   • Valium [Diazepam] Other (See Comments)     Doesn't like the way it makes her feel   • Zocor [Simvastatin] Other (See  Comments)     UNSURE   • Zyrtec [Cetirizine] Other (See Comments)     UNSURE     OB/GYN history: Patient started menstrual period at age 14.  She had menopause at age 55.  She has not had any children and she has not had any miscarriages.     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  She retired at age 63 in .  She never smoked.  She never did alcohol.  She is .  She now lives with her 96-year-old sister.  She is very active.     Family history: Father  of an accident at age 56.  Mother  of uterine cancer at age 87.  She had a brother who  of lung cancer at age 61.  She has a brother with heart attack at age 46.  She lives with her sister was 96-year-old.  Her father's brother's daughter had breast cancer.  At age 70.  She states that several of her cousins had breast cancer.  But they were all older.         Cancer-related family history includes Lung cancer in her brother; Uterine cancer in her mother.     I have reviewed the patient's medical history in detail and updated the computerized patient record.    Review of Systems   Constitutional: Negative for activity change, appetite change, chills, fatigue and fever.   HENT: Negative for mouth sores, nosebleeds and trouble swallowing.    Respiratory: Negative for cough and shortness of breath.    Cardiovascular: Negative for chest pain and leg swelling.   Gastrointestinal: Negative for abdominal pain, constipation, diarrhea, nausea and vomiting.   Genitourinary: Negative for difficulty urinating.   Musculoskeletal: Positive for back pain.   Skin: Negative for rash.   Neurological: Negative for dizziness, weakness and numbness.   Hematological: Negative for adenopathy. Does not bruise/bleed easily.   Psychiatric/Behavioral: Negative for sleep disturbance.       Objective      Vitals:    19 1430 19 1448   BP: (!)  "199/70 180/80   Pulse: 53    Resp: 16    Temp: 97.4 °F (36.3 °C)    TempSrc: Oral    SpO2: 98%    Weight: 49.6 kg (109 lb 6.4 oz)    Height: 147.3 cm (57.99\")    PainSc:   5    PainLoc: Leg  Comment: Lt leg pain      Current Status 7/30/2019   ECOG score 0       Physical Exam   Constitutional: She is oriented to person, place, and time. She appears well-developed and well-nourished. No distress.   HENT:   Head: Normocephalic and atraumatic.   Mouth/Throat: Oropharynx is clear and moist and mucous membranes are normal. No oropharyngeal exudate.   Eyes: Pupils are equal, round, and reactive to light.   Neck: Normal range of motion.   Cardiovascular: Normal rate, regular rhythm and normal heart sounds.   No murmur heard.  Pulmonary/Chest: Effort normal and breath sounds normal. No respiratory distress. She has no wheezes. She has no rhonchi. She has no rales.   BREAST: status post left mastectomy.  Left chest wall is clear without any palpable masses, skin changes, or nodularity. Right breast free of any palpable masses or nodularity.  There is no nipple retraction or discharge.  Axilla bilaterally free of adenopathy.   Abdominal: Soft. Normal appearance and bowel sounds are normal. She exhibits no distension. There is no hepatosplenomegaly.   Musculoskeletal: Normal range of motion. She exhibits no edema.   Neurological: She is alert and oriented to person, place, and time.   Skin: Skin is warm and dry. No rash noted.   Psychiatric: She has a normal mood and affect.   Vitals reviewed.      RECENT LABS:  Results from last 7 days   Lab Units 07/30/19  1412   WBC 10*3/mm3 6.47   NEUTROS ABS 10*3/mm3 4.12   HEMOGLOBIN g/dL 12.2   HEMATOCRIT % 37.4   PLATELETS 10*3/mm3 303     Results from last 7 days   Lab Units 07/30/19  1412   SODIUM mmol/L 137   POTASSIUM mmol/L 4.4   CHLORIDE mmol/L 99   CO2 mmol/L 29.1*   BUN mg/dL 10   CREATININE mg/dL 0.67   CALCIUM mg/dL 9.9   ALBUMIN g/dL 4.40   BILIRUBIN mg/dL 0.5   ALK PHOS U/L " 59   ALT (SGPT) U/L 11   AST (SGOT) U/L 13   GLUCOSE mg/dL 94         Assessment/Plan     1.  Left breast mass,T2 N0, stage IIa,   estrogen receptor greater than 90%, progesterone receptor 40%, HER-2/selwyn 2+ by immunohistochemistry and Is 2.2 with a HER-2/CEP ratio of 1.1 which is negative by fish. 2. Status post left mastectomy 12/7/2017 with sentinel lymph node biopsy.  Pathology shows T2 N0 M0 invasive colloid carcinoma, grade 2 with focal ductal carcinoma in situ at surgery.  3 sentinel lymph nodes are negative.  She has good margins.  She does not require any radiation. Mammogram  September 14, 2018 negative   The patient is not thought to be a candidate for chemotherapy given her age and medical comorbidities.  She declined hormonal therapy.  Patient was agreeable to try Evista.  However, after only 2 weeks of this medication she developed significant arthralgias, therefore has discontinued this.  · Mammogram done September 2018 is negative.  · Patient seen in follow-up 7/30/2019 and is doing well.  Her exam is negative.  She has decided she does not want have any further mammograms.  I will have her return in 6 months for follow-up visit with Dr. Penaloza.  We discussed she can return sooner should she develop any new concerns or problems.    2.  Family history of her cousin having had breast cancer at age 70.  No first-degree relative with breast cancer.  However she states several of her cousins have had breast cancer all when they were older.  Patient does not have any children of her own and does not want any genetic counseling.     3.  Severe vitamin D deficiency for which she continues on vitamin D 50,000 units by mouth once weekly.      4.  CT scan showing lung nodules and 2 low-density lesions in the liver which can be followed by CT scans, repeat CT November 2018 negative    5.  Significant arthralgias secondary to Evista. Therefore discontinued     6.  Hypertension: Patient reports she has whitecoat  syndrome.  She monitors her blood pressure at home and it typically ranges around 140/70.  She is on Norvasc, and typically takes this at bedtime.  She will closely monitor her blood pressure at home.    Plan:  1. Continue calcium and vitamin D supplementation.  2.  Return for follow-up visit with Dr. Penaloza in 6 months, sooner should she develop any new concerns or problems.        BLAIR Willis

## 2019-07-31 PROBLEM — R42 VERTIGO: Status: ACTIVE | Noted: 2019-01-01

## 2019-07-31 NOTE — PROGRESS NOTES
Subjective   Cristal Sams is a 89 y.o. female.     History of Present Illness   Patient was seen for hypertension.  Blood pressures running 140s over 80s at home.  Patient has developed a chronic cough but did not want to stop the lisinopril.  Patient was informed this could make her cough.  Patient does have chronic vertigo and wanted meclizine to relieve the symptoms.  He was instructed not to take it more than a week.  And to get a drug holiday for a week.  Patient's gastroesophageal reflux is been relieved with PPI.  Lipids relieved with diet and exercise.    Dictated utilizing Dragon dictation. If there are questions or for further clarification, please contact me.  The following portions of the patient's history were reviewed and updated as appropriate: allergies, current medications, past family history, past medical history, past social history, past surgical history and problem list.    Review of Systems   Constitutional: Negative for fatigue and fever.   HENT: Positive for congestion. Negative for trouble swallowing.    Eyes: Negative for discharge and visual disturbance.   Respiratory: Positive for cough. Negative for choking and shortness of breath.    Cardiovascular: Negative for chest pain and palpitations.   Gastrointestinal: Negative for abdominal pain and blood in stool.   Endocrine: Negative.    Genitourinary: Negative for genital sores and hematuria.   Musculoskeletal: Negative for gait problem and joint swelling.   Skin: Negative for color change, pallor, rash and wound.   Allergic/Immunologic: Positive for environmental allergies. Negative for immunocompromised state.   Neurological: Positive for dizziness. Negative for facial asymmetry and speech difficulty.   Psychiatric/Behavioral: Negative for hallucinations and suicidal ideas.       Objective   Physical Exam   Constitutional: She is oriented to person, place, and time. She appears well-developed and well-nourished.   HENT:   Head:  Normocephalic.   Eyes: Conjunctivae are normal. Pupils are equal, round, and reactive to light.   Neck: Normal range of motion. Neck supple.   Cardiovascular: Normal rate, regular rhythm and normal heart sounds.   Pulmonary/Chest: Effort normal and breath sounds normal.   Abdominal: Soft. Bowel sounds are normal.   Musculoskeletal: Normal range of motion.   Neurological: She is alert and oriented to person, place, and time.   Skin: Skin is warm and dry.   Psychiatric: She has a normal mood and affect. Her behavior is normal. Judgment and thought content normal.   Nursing note and vitals reviewed.      Assessment/Plan   Problems Addressed this Visit        Cardiovascular and Mediastinum    Hyperlipemia    Essential hypertension - Primary       Digestive    Gastroesophageal reflux disease with esophagitis      Other Visit Diagnoses     Cough

## 2019-08-02 NOTE — TELEPHONE ENCOUNTER
Beka called from [facility] insurance wants you to change relafen too either: voltaren,mobic

## 2019-08-05 NOTE — TELEPHONE ENCOUNTER
PT'S NIECE CALLING TO CHECK ON HER COUGH MEDS. SAYS DR. HERMAN WAS GOING TO SEND IN A REFILL ON ONE THAT SHE HAD PREVIOUSLY TAKEN

## 2019-08-08 NOTE — TELEPHONE ENCOUNTER
Called patient it was cough syrup not just phenergan, also she needed b/p med lisinopril 90 day sent  done

## 2019-08-08 NOTE — TELEPHONE ENCOUNTER
PATIENT WOULD LIKE SOMETHING CALLED IN FOR HER COUGH.     PHENERGAN WAS CALLED IN AND PATIENT IS NOT HAVING ANY NAUSEA

## 2019-08-28 NOTE — PROGRESS NOTES
Subjective   Cristal Sams is a 89 y.o. female.     History of Present Illness   Patient was seen for hypertension.  Blood pressures been running 130s over 80s.  Patient's blood sugar also running in the 90s.  Lipids controlled with diet exercise and a statin.  Patient to continue to monitor blood pressure and blood sugar and return to our clinic in 4 months.  Patient will continue her present treatment.    Dictated utilizing Dragon dictation. If there are questions or for further clarification, please contact me.  The following portions of the patient's history were reviewed and updated as appropriate: allergies, current medications, past family history, past medical history, past social history, past surgical history and problem list.    Review of Systems   Constitutional: Negative for fatigue and fever.   HENT: Positive for congestion. Negative for trouble swallowing.    Eyes: Negative for discharge and visual disturbance.   Respiratory: Negative for choking and shortness of breath.    Cardiovascular: Negative for chest pain and palpitations.   Gastrointestinal: Negative for abdominal pain and blood in stool.   Endocrine: Negative.    Genitourinary: Negative for genital sores and hematuria.   Musculoskeletal: Negative for gait problem and joint swelling.   Skin: Negative for color change, pallor, rash and wound.   Allergic/Immunologic: Positive for environmental allergies. Negative for immunocompromised state.   Neurological: Negative for facial asymmetry and speech difficulty.   Psychiatric/Behavioral: Negative for hallucinations and suicidal ideas.       Objective   Physical Exam   Constitutional: She is oriented to person, place, and time. She appears well-developed and well-nourished.   HENT:   Head: Normocephalic.   Eyes: Conjunctivae are normal. Pupils are equal, round, and reactive to light.   Neck: Normal range of motion. Neck supple.   Cardiovascular: Normal rate, regular rhythm and normal heart  sounds.   Pulmonary/Chest: Effort normal and breath sounds normal.   Abdominal: Soft. Bowel sounds are normal.   Musculoskeletal: Normal range of motion.   Neurological: She is alert and oriented to person, place, and time.   Skin: Skin is warm and dry.   Psychiatric: She has a normal mood and affect. Her behavior is normal. Judgment and thought content normal.   Nursing note and vitals reviewed.      Assessment/Plan #1 continue present medications for blood pressure and diabetes #2 continue to monitor blood pressure and diabetes #3 continue treatment for lipids with Crestor diet and exercise.  Problems Addressed this Visit        Cardiovascular and Mediastinum    Hyperlipemia    Essential hypertension - Primary    Relevant Medications    cyanocobalamin injection 1,000 mcg    Other Relevant Orders    CBC (No Diff) (Completed)       Digestive    B12 deficiency    Relevant Medications    cyanocobalamin injection 1,000 mcg    Other Relevant Orders    CBC (No Diff) (Completed)       Endocrine    Type 2 diabetes mellitus without complication (CMS/HCC)    Relevant Medications    cyanocobalamin injection 1,000 mcg    Other Relevant Orders    CBC (No Diff) (Completed)      Other Visit Diagnoses     Vitamin D deficiency         Vitamin B 12 deficiency        Low serum vitamin B12

## 2019-10-01 PROBLEM — I21.4 NON-STEMI (NON-ST ELEVATED MYOCARDIAL INFARCTION) (HCC): Status: ACTIVE | Noted: 2019-01-01

## 2019-10-02 PROBLEM — I50.21 ACUTE SYSTOLIC CHF (CONGESTIVE HEART FAILURE) (HCC): Status: ACTIVE | Noted: 2019-01-01

## 2019-10-04 NOTE — OUTREACH NOTE
[facility] Week 1 Survey      Responses   Facility patient discharged from?  [facility]   Does the patient have one of the following disease processes/diagnoses(primary or secondary)?  Acute MI (STEMI,NSTEMI)   Is there a successful TCM telephone encounter documented?  No   Week 1 attempt successful?  Yes   Call start time  1412   Call end time  1414   General alerts for this patient  Heart CAth    Discharge diagnosis  Hyperlipemia, Non-STEMI,  Acute systolic CHF   Is patient permission given to speak with other caregiver?  Yes   List who call center can speak with  POA- Alexander   Person spoke with today (if not patient) and relationship  POA   Meds reviewed with patient/caregiver?  Yes   Is the patient having any side effects they believe may be caused by any medication additions or changes?  No   Does the patient have all prescriptions related to this admission filled (includes statins,anticoagulants,HTN meds,anti-arrhythmia meds)  Yes   Is the patient taking all medications as directed (includes completed medication regime)?  Yes   Medication comments  Pt manages her own meds   Does the patient have a primary care provider?   Yes   Does the patient have an appointment with their PCP,cardiologist,or clinic within 7 days of discharge?  Yes   Has the patient kept scheduled appointments due by today?  N/A   Week 1 call completed?  Yes   Revoked  No further contact(revokes)-requires comment   Wrap up additional comments  quick call, POA ended call quickly          Saray Howard RN

## 2019-10-07 NOTE — TELEPHONE ENCOUNTER
I left a message with my contact information to get this patient enrolled in Cardiac Rehab phase II.

## 2019-10-10 NOTE — TELEPHONE ENCOUNTER
Called patient multiple times today but only get a VM.  Left message for her to call back if needed otherwise will plan on seeing her as scheduled next week.  
Prakash Sams called back asking if she could have home health come to her for awhile and to state she is not interested in coming to Cardiac Rehab.  She does not drive and has no one to bring her.  She also stated her Cath site is hard and she is having leg pain and the dressing is still in place.  I explained that needed to be reported to Dr. Zepeda's office along with requesting home health.  I gave her the contact information for his office.  
Impaired

## 2019-10-16 PROBLEM — I21.4 NON-STEMI (NON-ST ELEVATED MYOCARDIAL INFARCTION) (HCC): Status: RESOLVED | Noted: 2019-01-01 | Resolved: 2019-01-01

## 2019-10-16 PROBLEM — I50.21 ACUTE SYSTOLIC CHF (CONGESTIVE HEART FAILURE) (HCC): Status: RESOLVED | Noted: 2019-01-01 | Resolved: 2019-01-01

## 2019-10-16 PROBLEM — I25.10 CORONARY ARTERY DISEASE INVOLVING NATIVE CORONARY ARTERY OF NATIVE HEART WITHOUT ANGINA PECTORIS: Status: ACTIVE | Noted: 2019-01-01

## 2019-10-16 NOTE — PROGRESS NOTES
[facility] Follow Up Office Note     Encounter Date:10/16/19  Patient:Cristal Sams  :1927  MRN:[id number]      Chief Complaint: Hospital follow-up after NSTEMI    Chief Complaint   Patient presents with   • NSTEMI   • Congestive Heart Failure   • Hypertension         History of Presenting Illness:    Ms. Sams is a 89-year-old woman with past medical history notable for coronary artery disease with prior PCI, hypertension, diabetes, gastric reflux, and mixed hyperlipidemia who presents to the office for scheduled follow-up after recent hospitalization for non-ST elevation myocardial infarction.    She was hospitalized earlier this month with acute onset chest discomfort and was noted to have ischemic changes on her EKG with rising troponins and ongoing pain.  She was taken emergently to the Cath Lab where she underwent percutaneous intervention of her LAD.  She did well post procedure and was able to be discharged home the following day.  During her cardiac cath her left ventricular function was noted to be moderately depressed with anterior wall hypokinesis.  Fortunately her follow-up echocardiogram the following day demonstrated normal left ventricular function and recovery of stent myocardium.  She was initiated on dual antiplatelet therapy but beta-blocker therapy was not started due to baseline bradycardia.    Since going home she has been doing quite well.  She does have some baseline fatigue which is worse than when she came in but in general denies any significant exertional symptoms.  She denies any recurrent epigastric or chest discomfort.  She is tolerating her dual antiplatelet therapy without any significant bleeding.  Her cath sites have also improved and are recovering nicely.  She does have some ecchymosis over her right forearm and right groin but they are healing appropriately.    Review of Systems:  Review of Systems   Constitution: Negative.   HENT: Negative.     Eyes: Negative.    Cardiovascular: Positive for claudication and leg swelling.   Respiratory: Negative.    Endocrine: Negative.    Hematologic/Lymphatic: Negative.    Skin: Negative.    Musculoskeletal: Negative.    Gastrointestinal: Positive for bloating.   Genitourinary: Negative.    Neurological: Positive for excessive daytime sleepiness.   Psychiatric/Behavioral: Negative.    Allergic/Immunologic: Negative.        Current Outpatient Medications on File Prior to Visit   Medication Sig Dispense Refill   • amLODIPine (NORVASC) 5 MG tablet Take 5 mg by mouth Daily As Needed (IF BP IS OVER 150/90).     • aspirin 81 MG EC tablet Take 1 tablet by mouth Daily. 90 tablet 3   • Cholecalciferol (VITAMIN D) 2000 units tablet Take 2,000 Units by mouth Daily. 30 tablet 3   • clopidogrel (PLAVIX) 75 MG tablet Take 1 tablet by mouth Daily. 90 tablet 3   • esomeprazole (nexIUM) 40 MG capsule Take 1 capsule by mouth Every Morning Before Breakfast. 30 capsule 3   • glucose blood (ONE TOUCH ULTRA TEST) test strip Use to test BS Once Daily  DX code: E11.9 50 each 2   • Lancets (ONETOUCH ULTRASOFT) lancets USE TO TEST BLOOD SUGAR ONCE DAILY 100 each 3   • lisinopril (PRINIVIL,ZESTRIL) 20 MG tablet TAKE ONE TABLET BY MOUTH DAILY 90 tablet 0   • nitroglycerin (NITROSTAT) 0.4 MG SL tablet Place 1 tablet under the tongue Every 5 (Five) Minutes As Needed for Chest Pain for up to 2 doses. Take no more than 3 doses in 15 minutes. 30 tablet 12   • rosuvastatin (CRESTOR) 5 MG tablet TAKE ONE TABLET BY MOUTH DAILY 30 tablet 2     Current Facility-Administered Medications on File Prior to Visit   Medication Dose Route Frequency Provider Last Rate Last Dose   • cyanocobalamin injection 1,000 mcg  1,000 mcg Intramuscular Q28 Days George Rock MD   1,000 mcg at 08/28/19 1346       Allergies   Allergen Reactions   • Contrast Dye Other (See Comments)     PASS OUT   • Novocain [Procaine] Palpitations and Paresthesia     LEG GET NUMB   • Aleve  [Naproxen Sodium] GI Intolerance   • Cortizone-10 [Hydrocortisone] Other (See Comments)     UNSURE   • Eye Drops [Naphazoline-Polyethyl Glycol] Other (See Comments)     UNSURE   • Lipitor [Atorvastatin] Other (See Comments)     UNSURE   • Sulfur Other (See Comments)     UNSURE   • Valium [Diazepam] Other (See Comments)     Doesn't like the way it makes her feel   • Zocor [Simvastatin] Other (See Comments)     UNSURE   • Zyrtec [Cetirizine] Other (See Comments)     UNSURE       Past Medical History:   Diagnosis Date   • Acute systolic CHF (congestive heart failure) (CMS/[facility])    • Acute systolic CHF (congestive heart failure) (CMS/[facility]) 10/2/2019   • Anemia    • Arthritis    • Breast cancer (CMS/[facility])     LEFT   • Colon polyp 04/13/2012    HEPATIC FLEXURE: Tubular Adenoma   • Colon polyps 05/23/2017    RT COLON POLYP: Hyperplastic; SIMOID POLYP: Tubular Adenoma   • Diabetes mellitus (CMS/[facility])     DIET CONTROLLED   • Diverticulosis    • GERD (gastroesophageal reflux disease)    • H/O Cataract    • Hiatal hernia    • History of diverticulitis    • History of hepatitis 1958   • History of kidney stone    • History of peptic ulcer    • History of vertigo    • Hyperlipidemia    • Hypertension    • Inguinal hernia    • Migraine    • Non-STEMI (non-ST elevated myocardial infarction) (CMS/[facility]) 10/1/2019   • NSTEMI (non-ST elevated myocardial infarction) (CMS/[facility])    • Rheumatoid arthritis (CMS/[facility])    • Sciatic leg pain    • Scoliosis    • Unexplained weight loss     #43 lb in 3 months    • Visual impairment        Past Surgical History:   Procedure Laterality Date   • BREAST BIOPSY Left 2017   • CARDIAC CATHETERIZATION N/A 10/1/2019    Procedure: Left Heart Cath;  Surgeon: Jared Zepeda MD;  Location: [facility] CATH INVASIVE LOCATION;  Service: Cardiovascular   • CARDIAC CATHETERIZATION N/A 10/1/2019    Procedure: Coronary angiography;  Surgeon: Jared Zepeda MD;  Location:  [facility] CATH INVASIVE LOCATION;  Service:  Cardiovascular   • CARDIAC CATHETERIZATION N/A 10/1/2019    Procedure: Left ventriculography;  Surgeon: Jared Zepeda MD;  Location: [facility] CATH INVASIVE LOCATION;  Service: Cardiovascular   • CARDIAC CATHETERIZATION N/A 10/1/2019    Procedure: Stent ROBERT coronary;  Surgeon: Jared Zepeda MD;  [facility];  Service: Cardiovascular   • CATARACT EXTRACTION Bilateral 2008   • COLONOSCOPY N/A 04/12/2012    HEPATIC FLEXURE: Tubular Adenoma, Vicente Monroe   • COLONOSCOPY W/ POLYPECTOMY N/A 05/23/2017    CLS 7mm RT colon polyp Hot snare 2 cm polyp on stalk, Removed w/ hot snare, Vicente Monroe   • DILATATION AND CURETTAGE     • HYSTERECTOMY     • INGUINAL HERNIA REPAIR Bilateral 6/28/2018    Procedure: INGUINAL HERNIA REPAIR LAPAROSCOPIC;  Surgeon: George Phelps MD;  Location: [facility] MAIN OR;  Service: General   • MASTECTOMY     • MASTECTOMY WITH SENTINEL NODE BIOPSY AND AXILLARY NODE DISSECTION Left 12/7/2017    Procedure:  left total mastectomy, left axillary sentinel lymph node biopsy x 3 ;  Surgeon: Madison Ponce MD;  Location: [facility] OR OSC;  Service:    • SINUS SURGERY      CAUTERIZE A BLEED       Social History     Socioeconomic History   • Marital status:      Spouse name: Not on file   • Number of children: Not on file   • Years of education: 6 months college   • Highest education level: Not on file   Occupational History     Employer: RETIRED     Comment: [employer]   Tobacco Use   • Smoking status: Never Smoker   • Smokeless tobacco: Never Used   Substance and Sexual Activity   • Alcohol use: No   • Drug use: No   • Sexual activity: Defer       Family History   Problem Relation Age of Onset   • Uterine cancer Mother    • Coronary artery disease Mother    • Lung cancer Brother    • Heart attack Brother    • Heart disease Brother    • Coronary artery disease Sister    • Malig Hyperthermia Neg Hx        The following portions of  "the patient's history were reviewed and updated as appropriate: allergies, current medications, past family history, past medical history, past social history, past surgical history and problem list.       Objective:       Vitals:    10/16/19 1429   BP: 134/82   Pulse: 51   Weight: 49.7 kg (109 lb 9.6 oz)   Height: 147.3 cm (58\")         Physical Exam:  Constitutional: Frail, well appearing, small, no acute distress   HENT: Oropharynx clear and membrane moist  Eyes: Normal conjunctiva, no sclera icterus.  Neck: Supple, no carotid bruit bilaterally.  Cardiovascular: Regular rate and rhythm, early peaking systolic murmur heard best over the right upper sternal border, no lower extremity edema.  Pulmonary: Normal respiratory effort, no lung sounds, no wheezing.  Abdominal: Soft, nontender, no hepatosplenomegaly, liver is non-pulsatile.  Neurological: Alert and orient x 3,   Skin: Warm, dry, mild ecchymosis at cath sites but no hematoma, no rash.  Psych: Appropriate mood and affect. Normal judgment and insight.       Lab Results   Component Value Date    GLUCOSE 110 (H) 10/02/2019    BUN 13 10/02/2019    CREATININE 0.64 10/02/2019    EGFRIFNONA 87 10/02/2019    EGFRIFAFRI 100 10/18/2017    BCR 20.3 10/02/2019    K 3.8 10/02/2019    CO2 25.4 10/02/2019    CALCIUM 9.0 10/02/2019    PROTENTOTREF 6.7 10/18/2017    ALBUMIN 4.10 10/01/2019    LABIL2 2.0 10/18/2017    AST 16 10/01/2019    ALT 11 10/01/2019       Lab Results   Component Value Date    WBC 11.00 (H) 10/02/2019    HGB 10.9 (L) 10/02/2019    HCT 31.9 (L) 10/02/2019    MCV 95.8 10/02/2019     10/02/2019       Lab Results   Component Value Date    TROPONINT 0.148 (C) 10/01/2019       Lab Results   Component Value Date    CHOL 137 08/28/2019    CHOL 150 12/11/2018    CHOL 170 07/19/2018    CHLPL 148 10/18/2017     Lab Results   Component Value Date    TRIG 95 08/28/2019    TRIG 84 12/11/2018    TRIG 93 07/19/2018     Lab Results   Component Value Date    HDL 70 " (H) 08/28/2019    HDL 80 (H) 12/11/2018    HDL 75 (H) 07/19/2018     Lab Results   Component Value Date    LDL 48 08/28/2019    LDL 53 12/11/2018    LDL 76 07/19/2018       Lab Results   Component Value Date    TSH 1.670 03/07/2018         ECG 12 Lead  Date/Time: 10/16/2019 4:08 PM  Performed by: Jared Zepeda MD  Authorized by: Jared Zepeda MD   Comparison: compared with previous ECG from 10/2/2019  Comparison to previous ECG: T wave inversions have improved compared to prior EKG.  Rhythm: sinus rhythm  Other findings: T wave abnormality    Clinical impression: abnormal EKG        Echocardiogram 10/1/2019:  · Left ventricular systolic function is normal. Calculated EF = 57.0%.  Normal left ventricular cavity size and wall thickness noted. All left ventricular wall segments contract normally. Left ventricular diastolic function is normal.  · Mild to moderate aortic valve regurgitation is present.    Cardiac Catheterization 10/1/2019:  1.  Severe single-vessel coronary artery disease with 99% mid LAD stenoses with PAT II flow distally as well as moderate disease with 50% proximal LAD and 60 to 70% distal right coronary artery stenoses.  2.  Moderately reduced left ventricular function with anterior wall akinesis with normal left ventricular filling pressures of 12 mmHg.  3.  Successful revascularization of 99% mid LAD with placement of 2.5 x 18 mm Xience drug-eluting stent postdilated in the proximal segment with a 2.75 noncompliant balloon to 16 aby.  No residual stenoses and restoration of PAT-3 flow.  4.  Very tortuous radial approach anatomy with significant spasm limiting even diagnostic cardiac catheterization.       Assessment:          Diagnosis Plan   1. Coronary artery disease involving native coronary artery of native heart without angina pectoris     2. Essential hypertension     3. Bradycardia            Plan:     Ms. Sams is a 89-year-old woman with past medical history notable for  coronary artery disease with prior PCI, hypertension, diabetes, gastric reflux, and mixed hyperlipidemia who presents to the office for scheduled follow-up after recent hospitalization for non-ST elevation myocardial infarction.  She presents today for hospital follow-up and is doing quite well.  She is recovering nicely.  She seems to be tolerating her dual antiplatelet therapy without any significant issues.  I will continue with her current medical regimen with no changes needed at this time.  She declined cardiac rehab due to her social situation and inability to get to outpatient rehab.  I would continue to follow her clinically.      Coronary artery disease without angina:  - Continue dual antiplatelet therapy with aspirin 81 mg and clopidogrel 75 mg daily for at least one year   - If issues with GI bleeding with her history of peptic ulcer disease we may consider a more abbreviated course but she is to call us to discuss this first before stopping empirically  - No beta-blocker due to baseline bradycardia  - Continue lisinopril and statin therapy     Hypertension:  - Continue with current regimen     Mixed Hyperlipidemia:  - Continue statin     We will plan on seeing her back in 6 months or sooner if any new issues arise.    Jared Zepeda MD  [facility]  10/16/19  4:03 PM

## 2019-10-17 NOTE — PROGRESS NOTES
Subjective   Cristal Sams is a 89 y.o. female.     History of Present Illness   Current outpatient and discharge medications have been reconciled for the patient.  Reviewed by: George Rock MD  Patient is following up from hospital discharge.  Patient was seen for status post myocardial infarction.  Patient had one stent placement and was seen in follow-up.  Patient has a history of diabetes blood sugars been running in the 130s.  Patient does have bradycardia was not a beta-blocker for that reason.  She also has low vitamin B12 and was given an injection.  Patient will record her blood pressure blood sugar return to clinic in 1 month.    Dictated utilizing Dragon dictation. If there are questions or for further clarification, please contact me.  The following portions of the patient's history were reviewed and updated as appropriate: allergies, current medications, past family history, past medical history, past social history, past surgical history and problem list.    Review of Systems   Constitutional: Negative for fatigue and fever.   HENT: Positive for congestion. Negative for trouble swallowing.    Eyes: Negative for discharge and visual disturbance.   Respiratory: Negative for choking and shortness of breath.    Cardiovascular: Negative for chest pain and palpitations.   Gastrointestinal: Negative for abdominal pain and blood in stool.   Endocrine: Negative.    Genitourinary: Negative for genital sores and hematuria.   Musculoskeletal: Negative for gait problem and joint swelling.   Skin: Negative for color change, pallor, rash and wound.   Allergic/Immunologic: Positive for environmental allergies. Negative for immunocompromised state.   Neurological: Negative for facial asymmetry and speech difficulty.   Psychiatric/Behavioral: Negative for hallucinations and suicidal ideas.       Objective   Physical Exam   Constitutional: She is oriented to person, place, and time. She appears well-developed  and well-nourished.   HENT:   Head: Normocephalic.   Eyes: Conjunctivae are normal. Pupils are equal, round, and reactive to light.   Neck: Normal range of motion. Neck supple.   Cardiovascular: Normal rate, regular rhythm and normal heart sounds. Exam reveals no gallop and no friction rub.   No murmur heard.  Pulmonary/Chest: Effort normal and breath sounds normal. No stridor. No respiratory distress. She has no wheezes. She has no rales. She exhibits no tenderness.   Abdominal: Soft. Bowel sounds are normal.   Musculoskeletal: Normal range of motion.   Neurological: She is alert and oriented to person, place, and time.   Skin: Skin is warm and dry.   Psychiatric: She has a normal mood and affect. Her behavior is normal. Judgment and thought content normal.   Nursing note and vitals reviewed.      Assessment/Plan #1 blood pressure blood sugar check #2 weight check 3 B12 injection #4 return to clinic in 1 month  Cristal was seen today for follow-up.    Diagnoses and all orders for this visit:    Coronary artery disease involving native coronary artery of native heart without angina pectoris    Bradycardia    Type 2 diabetes mellitus without complication, without long-term current use of insulin (CMS/[facility])    Low vitamin B12 level  -     cyanocobalamin injection 1,000 mcg  -     cyanocobalamin injection 1,000 mcg

## 2019-12-07 PROBLEM — I61.9 INTRACEREBRAL HEMORRHAGE, NONTRAUMATIC (HCC): Status: ACTIVE | Noted: 2019-01-01

## 2019-12-07 NOTE — H&P
HISTORY AND PHYSICAL   [facility]        Patient Identification:  Name: Cristal Sams  Age: 89 y.o.  Sex: female  :  1927  MRN: [id number]                     Primary Care Physician: George Rock MD    Chief Complaint: Unresponsive    History of Present Illness:         The patient is a 89-year-old white female with history of CHF, anemia, arthritis, history of breast cancer, colon polyps, GERD, hypertension, hyperlipidemia, migraine and history of MI who was admitted with history of being found in the morning to be unresponsive.  She apparently been about her baseline the night before.  EMS came brought to the hospital for evaluation she was found to have large intracerebral hemorrhage.  The patient was unable to give any history.  She was unresponsive.  The patient is DNR and family at the bedside just wanted to be made comfort care and understand it her time is short.  The patient is going to be admitted to the palliative care unit for comfort measures only.    Past Medical History:  Past Medical History:   Diagnosis Date   • Acute systolic CHF (congestive heart failure) (CMS/HCC)    • Acute systolic CHF (congestive heart failure) (CMS/HCC) 10/2/2019   • Anemia    • Arthritis    • Breast cancer (CMS/HCC)     LEFT   • Colon polyp 2012    HEPATIC FLEXURE: Tubular Adenoma   • Colon polyps 2017    RT COLON POLYP: Hyperplastic; SIMOID POLYP: Tubular Adenoma   • Diabetes mellitus (CMS/HCC)     DIET CONTROLLED   • Diverticulosis    • GERD (gastroesophageal reflux disease)    • H/O Cataract    • Hiatal hernia    • History of diverticulitis    • History of hepatitis    • History of kidney stone    • History of peptic ulcer    • History of vertigo    • Hyperlipidemia    • Hypertension    • Inguinal hernia    • Migraine    • Non-STEMI (non-ST elevated myocardial infarction) (CMS/HCC) 10/1/2019   • NSTEMI (non-ST elevated myocardial infarction) (CMS/HCC)    • Rheumatoid  arthritis (CMS/HCC)    • Sciatic leg pain    • Scoliosis    • Unexplained weight loss     #43 lb in 3 months    • Visual impairment      Past Surgical History:  Past Surgical History:   Procedure Laterality Date   • BREAST BIOPSY Left 2017   • CARDIAC CATHETERIZATION N/A 10/1/2019    Procedure: Left Heart Cath;  Surgeon: Jared Zepeda MD;  Location: [facility] CATH INVASIVE LOCATION;  Service: Cardiovascular   • CARDIAC CATHETERIZATION N/A 10/1/2019    Procedure: Coronary angiography;  Surgeon: Jared Zepeda MD;  Location: [facility] CATH INVASIVE LOCATION;  Service: Cardiovascular   • CARDIAC CATHETERIZATION N/A 10/1/2019    Procedure: Left ventriculography;  Surgeon: Jared Zepeda MD;  Location: [facility] CATH INVASIVE LOCATION;  Service: Cardiovascular   • CARDIAC CATHETERIZATION N/A 10/1/2019    Procedure: Stent ROBERT coronary;  Surgeon: Jared Zepeda MD;  Location: [facility] CATH INVASIVE LOCATION;  Service: Cardiovascular   • CARDIAC SURGERY      stent   • CATARACT EXTRACTION Bilateral 2008   • COLONOSCOPY N/A 04/12/2012    HEPATIC FLEXURE: Tubular Adenoma, Vicente Monroe   • COLONOSCOPY W/ POLYPECTOMY N/A 05/23/2017    CLS 7mm RT colon polyp Hot snare 2 cm polyp on stalk, Removed w/ hot snare, Vicente Monroe   • DILATATION AND CURETTAGE     • HYSTERECTOMY     • INGUINAL HERNIA REPAIR Bilateral 6/28/2018    Procedure: INGUINAL HERNIA REPAIR LAPAROSCOPIC;  Surgeon: George Phelps MD;  Location: [facility];  Service: General   • MASTECTOMY     • MASTECTOMY WITH SENTINEL NODE BIOPSY AND AXILLARY NODE DISSECTION Left 12/7/2017    Procedure:  left total mastectomy, left axillary sentinel lymph node biopsy x 3 ;  Surgeon: Madison Ponce MD;  Location: [facility];  Service:    • SINUS SURGERY      CAUTERIZE A BLEED      Home Meds:    (Not in a hospital admission)  Current meds    Current Facility-Administered Medications:   •  acetaminophen (TYLENOL) tablet 650 mg, 650 mg, Oral,  Q4H PRN **OR** acetaminophen (TYLENOL) 160 MG/5ML solution 650 mg, 650 mg, Oral, Q4H PRN **OR** acetaminophen (TYLENOL) suppository 650 mg, 650 mg, Rectal, Q4H PRN, Kulwinder Coyne MD  •  cyanocobalamin injection 1,000 mcg, 1,000 mcg, Intramuscular, Q28 Days, George Rock MD, 1,000 mcg at 08/28/19 1346  •  cyanocobalamin injection 1,000 mcg, 1,000 mcg, Intramuscular, Q28 Days, George Rock MD  •  cyanocobalamin injection 1,000 mcg, 1,000 mcg, Intramuscular, Q28 Days, George Rock MD, 1,000 mcg at 10/17/19 1725  •  diphenoxylate-atropine (LOMOTIL) 2.5-0.025 MG per tablet 1 tablet, 1 tablet, Oral, Q2H PRN, Kulwinder Coyne MD  •  Glycerin-Hypromellose- (ARTIFICIAL TEARS) 0.2-0.2-1 % ophthalmic solution solution 1 drop, 1 drop, Both Eyes, Q30 Min PRN, Kulwinder Coyne MD  •  LORazepam (ATIVAN) injection 0.5 mg, 0.5 mg, Intravenous, Q1H PRN **OR** LORazepam (ATIVAN) 2 MG/ML concentrated solution 0.5 mg, 0.5 mg, Sublingual, Q1H PRN, Kulwinder Coyne MD  •  LORazepam (ATIVAN) injection 1 mg, 1 mg, Intravenous, Q1H PRN **OR** LORazepam (ATIVAN) 2 MG/ML concentrated solution 1 mg, 1 mg, Sublingual, Q1H PRN, Kulwinder Coyne MD  •  LORazepam (ATIVAN) injection 2 mg, 2 mg, Intravenous, Q1H PRN **OR** LORazepam (ATIVAN) 2 MG/ML concentrated solution 2 mg, 2 mg, Sublingual, Q1H PRN, Kulwinder Coyne MD  •  [COMPLETED] Insert peripheral IV, , , Once **AND** sodium chloride 0.9 % flush 10 mL, 10 mL, Intravenous, PRN, Kulwinder Coyne MD    Current Outpatient Medications:   •  amLODIPine (NORVASC) 5 MG tablet, Take 5 mg by mouth Daily As Needed (IF BP IS OVER 150/90)., Disp: , Rfl:   •  aspirin 81 MG EC tablet, Take 1 tablet by mouth Daily., Disp: 90 tablet, Rfl: 3  •  Cholecalciferol (VITAMIN D) 2000 units tablet, Take 2,000 Units by mouth Daily., Disp: 30 tablet, Rfl: 3  •  clopidogrel (PLAVIX) 75 MG tablet, Take 1 tablet by mouth Daily., Disp: 90 tablet, Rfl: 3  •  esomeprazole (nexIUM) 40 MG  capsule, Take 1 capsule by mouth Every Morning Before Breakfast., Disp: 30 capsule, Rfl: 3  •  Esomeprazole Magnesium (NEXIUM PO), Take 40 mg by mouth Daily. Name Brand Only, Disp: , Rfl:   •  glucose blood (ONE TOUCH ULTRA TEST) test strip, Use to test BS Once Daily DX code: E11.9, Disp: 50 each, Rfl: 2  •  Lancets (ONETOUCH ULTRASOFT) lancets, USE TO TEST BLOOD SUGAR ONCE DAILY, Disp: 100 each, Rfl: 3  •  lisinopril (PRINIVIL,ZESTRIL) 20 MG tablet, TAKE ONE TABLET BY MOUTH DAILY, Disp: 90 tablet, Rfl: 0  •  nitroglycerin (NITROSTAT) 0.4 MG SL tablet, Place 1 tablet under the tongue Every 5 (Five) Minutes As Needed for Chest Pain for up to 2 doses. Take no more than 3 doses in 15 minutes., Disp: 30 tablet, Rfl: 12  •  rosuvastatin (CRESTOR) 5 MG tablet, TAKE ONE TABLET BY MOUTH DAILY, Disp: 30 tablet, Rfl: 3  Allergies:  Allergies   Allergen Reactions   • Contrast Dye Other (See Comments)     PASS OUT   • Novocain [Procaine] Palpitations and Paresthesia     LEG GET NUMB   • Aleve [Naproxen Sodium] GI Intolerance   • Cortizone-10 [Hydrocortisone] Other (See Comments)     UNSURE   • Eye Drops [Naphazoline-Polyethyl Glycol] Other (See Comments)     UNSURE   • Lipitor [Atorvastatin] Other (See Comments)     UNSURE   • Sulfur Other (See Comments)     UNSURE   • Valium [Diazepam] Other (See Comments)     Doesn't like the way it makes her feel   • Zocor [Simvastatin] Other (See Comments)     UNSURE   • Zyrtec [Cetirizine] Other (See Comments)     UNSURE     Immunizations:    There is no immunization history on file for this patient.  Social History:   Social History     Social History Narrative   • Not on file     Social History     Socioeconomic History   • Marital status:      Spouse name: Not on file   • Number of children: Not on file   • Years of education: 6 months college   • Highest education level: Not on file   Occupational History     Employer: RETIRED     Comment: [employer]   Tobacco Use   •  "Smoking status: Never Smoker   • Smokeless tobacco: Never Used   Substance and Sexual Activity   • Alcohol use: No   • Drug use: No   • Sexual activity: Defer       Family History:  Family History   Problem Relation Age of Onset   • Uterine cancer Mother    • Coronary artery disease Mother    • Lung cancer Brother    • Heart attack Brother    • Heart disease Brother    • Coronary artery disease Sister    • Malig Hyperthermia Neg Hx         Review of Systems  See history of present illness and past medical history.  Patient is unresponsive and unable to answer any questions.  Remainder of ROS is negative.    Objective:  tMax 24 hrs: Temp (24hrs), Av.9 °F (36.1 °C), Min:96.1 °F (35.6 °C), Max:97.6 °F (36.4 °C)    Vitals Ranges:   Temp:  [96.1 °F (35.6 °C)-97.6 °F (36.4 °C)] 97.6 °F (36.4 °C)  Heart Rate:  [42-63] 42  Resp:  [12-14] 12  BP: (217-227)/() 227/66      Exam:  BP (!) 227/66   Pulse (!) 42   Temp 97.6 °F (36.4 °C)   Resp 12   Ht 157.5 cm (62\")   Wt 51.3 kg (113 lb)   LMP  (LMP Unknown)   SpO2 98%   BMI 20.67 kg/m²     General Appearance:   Unresponsive, no distress, appears stated age   Head:    Normocephalic, without obvious abnormality, atraumatic   Eyes:    PERRL, conjunctiva/corneas clear, EOM's intact, both eyes   Ears:    Normal external ear canals, both ears   Nose:   Nares normal, septum midline, mucosa normal, no drainage    or sinus tenderness   Throat:   Lips, mucosa, and tongue normal   Neck:   Supple, symmetrical, trachea midline, no adenopathy;     thyroid:  no enlargement/tenderness/nodules; no carotid    bruit or JVD   Back:     Symmetric, no curvature, ROM normal, no CVA tenderness   Lungs:    Rhonchi bilaterally, respirations unlabored   Chest Wall:    No tenderness or deformity    Heart:    Regular rate and rhythm, S1 and S2 normal, no murmur, rub   or gallop   Abdomen:     Soft, non-tender, bowel sounds active all four quadrants,     no masses, no hepatomegaly, no " splenomegaly   Extremities:   Extremities normal, atraumatic, no cyanosis or edema   Pulses:   2+ and symmetric all extremities   Skin:   Skin color, texture, turgor normal, no rashes or lesions   Lymph nodes:   Cervical, supraclavicular, and axillary nodes normal   Neurologic:  Patient is unresponsive and has bilateral upgoing toes      .    Data Review:  Lab Results (last 72 hours)     Procedure Component Value Units Date/Time    Comprehensive Metabolic Panel [[id number]]  (Abnormal) Collected:  12/07/19 1211    Specimen:  Blood Updated:  12/07/19 1246     Glucose 222 mg/dL      BUN 12 mg/dL      Creatinine 0.64 mg/dL      Sodium 135 mmol/L      Potassium 3.4 mmol/L      Chloride 96 mmol/L      CO2 23.2 mmol/L      Calcium 9.3 mg/dL      Total Protein 6.9 g/dL      Albumin 4.30 g/dL      ALT (SGPT) 14 U/L      AST (SGOT) 28 U/L      Alkaline Phosphatase 60 U/L      Total Bilirubin 0.4 mg/dL      eGFR Non African Amer 87 mL/min/1.73      Globulin 2.6 gm/dL      A/G Ratio 1.7 g/dL      BUN/Creatinine Ratio 18.8     Anion Gap 15.8 mmol/L     Narrative:       GFR Normal >60  Chronic Kidney Disease <60  Kidney Failure <15      Protime-INR [[id number]]  (Normal) Collected:  12/07/19 1211    Specimen:  Blood Updated:  12/07/19 1240     Protime 12.6 Seconds      INR 0.97    aPTT [[id number]]  (Normal) Collected:  12/07/19 1211    Specimen:  Blood Updated:  12/07/19 1240     PTT 25.0 seconds     CBC & Differential [[id number]] Collected:  12/07/19 1211    Specimen:  Blood Updated:  12/07/19 1222    Narrative:       The following orders were created for panel order CBC & Differential.  Procedure                               Abnormality         Status                     ---------                               -----------         ------                     CBC Auto Differential[[id number]]        Abnormal            Final result                 Please view results for these tests on the individual orders.    CBC Auto  Differential [[id number]]  (Abnormal) Collected:  12/07/19 1211    Specimen:  Blood Updated:  12/07/19 1222     WBC 20.51 10*3/mm3      RBC 3.61 10*6/mm3      Hemoglobin 11.7 g/dL      Hematocrit 34.9 %      MCV 96.7 fL      MCH 32.4 pg      MCHC 33.5 g/dL      RDW 11.8 %      RDW-SD 41.8 fl      MPV 9.9 fL      Platelets 393 10*3/mm3      Neutrophil % 78.3 %      Lymphocyte % 16.0 %      Monocyte % 4.3 %      Eosinophil % 0.2 %      Basophil % 0.3 %      Immature Grans % 0.9 %      Neutrophils, Absolute 16.03 10*3/mm3      Lymphocytes, Absolute 3.29 10*3/mm3      Monocytes, Absolute 0.89 10*3/mm3      Eosinophils, Absolute 0.05 10*3/mm3      Basophils, Absolute 0.07 10*3/mm3      Immature Grans, Absolute 0.18 10*3/mm3      nRBC 0.0 /100 WBC                    Imaging Results (All)     Procedure Component Value Units Date/Time    CT Head Without Contrast [[id number]] Collected:  12/07/19 1244     Updated:  12/07/19 1253    Narrative:       CT HEAD WO CONTRAST-     INDICATIONS: Altered mental status     TECHNIQUE: Radiation dose reduction techniques were utilized, including  automated exposure control and exposure modulation based on body size.  Noncontrast head CT     COMPARISON: 02/16/2018     FINDINGS:     . Large intraparenchymal hemorrhage predominantly involving the left  frontal and parietal lobes, approximately 6.5 x 9.7 cm on image 30 of  series 1, with extension of hemorrhage into the left lateral ventricle,  occipital horn of the right lateral ventricle, and third ventricle,  effacement of the left lateral ventricle, rightward midline shift of  about 1.4 cm on image 23 of series 1. Edema surrounds the parenchymal  hemorrhage. An area of hemorrhage is also evident in the right parietal  lobe, about 1.5 cm on image 36 of series 1, with surrounding edema.  Additional foci of hemorrhage are seen, for example small foci more  anteriorly in the left frontal lobe such as 6 mm on image 40 and in the  left occipital  lobe, 5 mm on image 24. Some subarachnoid or subdural  hemorrhage is apparent posteriorly in the left, for example image 24.  Subdural hematoma along the left periphery 3 mm in thickness on image  30. Subdural hematoma is noted along the left falx tentorium. Multiple  additional scattered foci of hemorrhage are apparent on the left.     Right sphenoid sinus is mostly opacified. Minimal fluid in right  maxillary sinus. Mastoid air cells are hypopneumatized. The visualized  paranasal sinuses, orbits, mastoid air cells are otherwise unremarkable.                   Impression:          Extensive multifocal intracranial hemorrhage, with mass effect,  ventricular effacement, and rightward midline shift by about 1.4 cm.  Critical test result.     Discussed by telephone with Dr. Coyne at time of interpretation, 1247,  12/11/2019.     This report was finalized on 12/7/2019 12:50 PM by Dr. Levon Grissom M.D.           Past Medical History:   Diagnosis Date   • Acute systolic CHF (congestive heart failure) (CMS/HCC)    • Acute systolic CHF (congestive heart failure) (CMS/HCC) 10/2/2019   • Anemia    • Arthritis    • Breast cancer (CMS/HCC)     LEFT   • Colon polyp 04/13/2012    HEPATIC FLEXURE: Tubular Adenoma   • Colon polyps 05/23/2017    RT COLON POLYP: Hyperplastic; SIMOID POLYP: Tubular Adenoma   • Diabetes mellitus (CMS/HCC)     DIET CONTROLLED   • Diverticulosis    • GERD (gastroesophageal reflux disease)    • H/O Cataract    • Hiatal hernia    • History of diverticulitis    • History of hepatitis 1958   • History of kidney stone    • History of peptic ulcer    • History of vertigo    • Hyperlipidemia    • Hypertension    • Inguinal hernia    • Migraine    • Non-STEMI (non-ST elevated myocardial infarction) (CMS/HCC) 10/1/2019   • NSTEMI (non-ST elevated myocardial infarction) (CMS/HCC)    • Rheumatoid arthritis (CMS/HCC)    • Sciatic leg pain    • Scoliosis    • Unexplained weight loss     #43 lb in 3 months     • Visual impairment        Assessment:  Active Hospital Problems    Diagnosis  POA   • **Intracerebral hemorrhage, nontraumatic (CMS/HCC) [I61.9]  Yes   • Coronary artery disease involving native coronary artery of native heart without angina pectoris [I25.10]  Yes   • DNR (do not resuscitate) [Z66]  Yes   • Essential hypertension [I10]  Yes   • Osteoarthritis of multiple joints [M15.9]  Yes   • Gastroesophageal reflux disease with esophagitis [K21.0]  Yes      Resolved Hospital Problems   No resolved problems to display.       Plan:  The patient is admitted to hospital for comfort measures only.  Palliative care order set has been ordered.  Patient is DNR with comfort care only.  Discussed with her family.    Dex White MD  12/7/2019  1:46 PM

## 2019-12-07 NOTE — ED NOTES
Pt was brought in by ems for ams. Pt was found unresponsive by family this am. Last known well last night. Pt withdraws from painful stimuli. Pupils pinpoint and deviated to the left. Pt was given narcan by ems with no response     Prudence Whatley RN  12/07/19 7227

## 2019-12-07 NOTE — ED PROVIDER NOTES
EMERGENCY DEPARTMENT ENCOUNTER    CHIEF COMPLAINT  Chief Complaint: unresponsive  History given by: EMS  History limited by: unresponsiveness  Room Number: 13/13  PMD: George Rock MD      HPI:  Pt is a 89 y.o. female who presents to the ED after being found unresponsive by her daughter in bed just PTA. Per EMS, the pt lives with her daughter who thought the pt was initially sleeping in. When she went up to check on the pt, she was found unresponsive in bed. Per EMS, the pt had pinpoint pupils and was given Narcan en route. Pt's LKN was sometime last night. Per EMS, the pt had a arctic stent placed sometime within the past few months.       PAST MEDICAL HISTORY  Active Ambulatory Problems     Diagnosis Date Noted   • Osteoarthritis of multiple joints 06/22/2016   • Hyperlipemia 06/22/2016   • Gastroesophageal reflux disease with esophagitis 06/22/2016   • B12 deficiency 06/22/2016   • Essential hypertension 12/16/2016   • Anemia 12/16/2016   • Heme positive stool 12/23/2016   • Ataxia 09/08/2017   • Hypercalcemia 09/19/2017   • Malignant neoplasm of female breast (CMS/HCC) 09/27/2017   • Malignant neoplasm of central portion of left breast in female, estrogen receptor positive (CMS/HCC) 10/23/2017   • Influenza B 02/16/2018   • Syncope 02/16/2018   • Bradycardia 02/17/2018   • Neutropenia associated with infection (CMS/HCC) 02/18/2018   • DNR (do not resuscitate) 02/19/2018   • Nausea & vomiting 02/19/2018   • Pneumonia 03/07/2018   • Type 2 diabetes mellitus without complication (CMS/HCC) 03/29/2018   • Hernia of anterior abdominal wall 07/26/2018   • Vertigo 07/31/2019   • Coronary artery disease involving native coronary artery of native heart without angina pectoris 10/16/2019     Resolved Ambulatory Problems     Diagnosis Date Noted   • DM (diabetes mellitus), type 2 (CMS/HCC) 12/16/2016   • Breast nodule 09/19/2017   • Hyponatremia 02/16/2018   • Hypoxia 02/17/2018   • Convulsions (CMS/HCC) 02/17/2018    • Slow transit constipation 02/20/2018   • Pneumonia due to influenza A virus 03/07/2018   • Non-recurrent bilateral inguinal hernia with obstruction without gangrene 06/22/2018   • Non-STEMI (non-ST elevated myocardial infarction) (CMS/[facility]) 10/01/2019   • Acute systolic CHF (congestive heart failure) (CMS/[facility]) 10/02/2019     Past Medical History:   Diagnosis Date   • Arthritis    • Breast cancer (CMS/[facility])    • Colon polyp 04/13/2012   • Colon polyps 05/23/2017   • Diabetes mellitus (CMS/[facility])    • Diverticulosis    • GERD (gastroesophageal reflux disease)    • H/O Cataract    • Hiatal hernia    • History of diverticulitis    • History of hepatitis 1958   • History of kidney stone    • History of peptic ulcer    • History of vertigo    • Hyperlipidemia    • Hypertension    • Inguinal hernia    • Migraine    • NSTEMI (non-ST elevated myocardial infarction) (CMS/[facility])    • Rheumatoid arthritis (CMS/[facility])    • Sciatic leg pain    • Scoliosis    • Unexplained weight loss    • Visual impairment        PAST SURGICAL HISTORY  Past Surgical History:   Procedure Laterality Date   • BREAST BIOPSY Left 2017   • CARDIAC CATHETERIZATION N/A 10/1/2019    Procedure: Left Heart Cath;  Surgeon: Jared Zepeda MD;  Location: [facility] CATH INVASIVE LOCATION;  Service: Cardiovascular   • CARDIAC CATHETERIZATION N/A 10/1/2019    Procedure: Coronary angiography;  Surgeon: Jared Zepeda MD;  Location: [facility] CATH INVASIVE LOCATION;  Service: Cardiovascular   • CARDIAC CATHETERIZATION N/A 10/1/2019    Procedure: Left ventriculography;  Surgeon: Jared Zepeda MD;  Location: [facility] CATH INVASIVE LOCATION;  Service: Cardiovascular   • CARDIAC CATHETERIZATION N/A 10/1/2019    Procedure: Stent ROBERT coronary;  Surgeon: Jared Zepeda MD;  Location: [facility] CATH INVASIVE LOCATION;  Service: Cardiovascular   • CARDIAC SURGERY      stent   • CATARACT EXTRACTION Bilateral 2008   • COLONOSCOPY N/A 04/12/2012    HEPATIC FLEXURE: Tubular  Adenoma, Vicente Monroe   • COLONOSCOPY W/ POLYPECTOMY N/A 05/23/2017    CLS 7mm RT colon polyp Hot snare 2 cm polyp on stalk, Removed w/ hot snare, Vicente Monroe   • DILATATION AND CURETTAGE     • HYSTERECTOMY     • INGUINAL HERNIA REPAIR Bilateral 6/28/2018    Procedure: INGUINAL HERNIA REPAIR LAPAROSCOPIC;  Surgeon: George Phelps MD;  Location: [facility];  Service: General   • MASTECTOMY     • MASTECTOMY WITH SENTINEL NODE BIOPSY AND AXILLARY NODE DISSECTION Left 12/7/2017    Procedure:  left total mastectomy, left axillary sentinel lymph node biopsy x 3 ;  Surgeon: Madison Ponce MD;  Location: [facility];  Service:    • SINUS SURGERY      CAUTERIZE A BLEED       FAMILY HISTORY  Family History   Problem Relation Age of Onset   • Uterine cancer Mother    • Coronary artery disease Mother    • Lung cancer Brother    • Heart attack Brother    • Heart disease Brother    • Coronary artery disease Sister    • Malig Hyperthermia Neg Hx        SOCIAL HISTORY  Social History     Socioeconomic History   • Marital status:      Spouse name: Not on file   • Number of children: Not on file   • Years of education: 6 months college   • Highest education level: Not on file   Occupational History     Employer: RETIRED     Comment: [employer]   Tobacco Use   • Smoking status: Never Smoker   • Smokeless tobacco: Never Used   Substance and Sexual Activity   • Alcohol use: No   • Drug use: No   • Sexual activity: Defer       ALLERGIES  Contrast dye; Novocain [procaine]; Aleve [naproxen sodium]; Cortizone-10 [hydrocortisone]; Eye drops [naphazoline-polyethyl glycol]; Lipitor [atorvastatin]; Sulfur; Valium [diazepam]; Zocor [simvastatin]; and Zyrtec [cetirizine]    REVIEW OF SYSTEMS  Review of Systems   Unable to perform ROS: Patient unresponsive       PHYSICAL EXAM  ED Triage Vitals [12/07/19 1205]   Temp Heart Rate Resp BP SpO2   -- 63 14 -- 90 %      Temp src Heart Rate Source  Patient Position BP Location FiO2 (%)   -- -- -- -- --       Physical Exam   Constitutional: She appears distressed.   Pt unresponsive   HENT:   Head: Normocephalic and atraumatic.   Neck: Normal range of motion.   Cardiovascular: Normal rate and regular rhythm.   Pulmonary/Chest: Effort normal and breath sounds normal. No respiratory distress. She has no wheezes. She has no rales.   Respirations shallow but nonlabored   Neurological:   Eyes deviated to left. Decerebrate posturing to painful stimuli.   Skin: Skin is warm and dry.   Psychiatric: Affect normal.   Nursing note and vitals reviewed.      LAB RESULTS  Lab Results (last 24 hours)     Procedure Component Value Units Date/Time    CBC & Differential [[id number]] Collected:  12/07/19 1211    Specimen:  Blood Updated:  12/07/19 1222    Narrative:       The following orders were created for panel order CBC & Differential.  Procedure                               Abnormality         Status                     ---------                               -----------         ------                     CBC Auto Differential[[id number]]        Abnormal            Final result                 Please view results for these tests on the individual orders.    Comprehensive Metabolic Panel [[id number]]  (Abnormal) Collected:  12/07/19 1211    Specimen:  Blood Updated:  12/07/19 1246     Glucose 222 mg/dL      BUN 12 mg/dL      Creatinine 0.64 mg/dL      Sodium 135 mmol/L      Potassium 3.4 mmol/L      Chloride 96 mmol/L      CO2 23.2 mmol/L      Calcium 9.3 mg/dL      Total Protein 6.9 g/dL      Albumin 4.30 g/dL      ALT (SGPT) 14 U/L      AST (SGOT) 28 U/L      Alkaline Phosphatase 60 U/L      Total Bilirubin 0.4 mg/dL      eGFR Non African Amer 87 mL/min/1.73      Globulin 2.6 gm/dL      A/G Ratio 1.7 g/dL      BUN/Creatinine Ratio 18.8     Anion Gap 15.8 mmol/L     Narrative:       GFR Normal >60  Chronic Kidney Disease <60  Kidney Failure <15      Protime-INR [[id number]]   (Normal) Collected:  12/07/19 1211    Specimen:  Blood Updated:  12/07/19 1240     Protime 12.6 Seconds      INR 0.97    aPTT [[id number]]  (Normal) Collected:  12/07/19 1211    Specimen:  Blood Updated:  12/07/19 1240     PTT 25.0 seconds     CBC Auto Differential [[id number]]  (Abnormal) Collected:  12/07/19 1211    Specimen:  Blood Updated:  12/07/19 1222     WBC 20.51 10*3/mm3      RBC 3.61 10*6/mm3      Hemoglobin 11.7 g/dL      Hematocrit 34.9 %      MCV 96.7 fL      MCH 32.4 pg      MCHC 33.5 g/dL      RDW 11.8 %      RDW-SD 41.8 fl      MPV 9.9 fL      Platelets 393 10*3/mm3      Neutrophil % 78.3 %      Lymphocyte % 16.0 %      Monocyte % 4.3 %      Eosinophil % 0.2 %      Basophil % 0.3 %      Immature Grans % 0.9 %      Neutrophils, Absolute 16.03 10*3/mm3      Lymphocytes, Absolute 3.29 10*3/mm3      Monocytes, Absolute 0.89 10*3/mm3      Eosinophils, Absolute 0.05 10*3/mm3      Basophils, Absolute 0.07 10*3/mm3      Immature Grans, Absolute 0.18 10*3/mm3      nRBC 0.0 /100 WBC           I ordered the above labs and reviewed the results    RADIOLOGY  CT Head Without Contrast   Final Result       Extensive multifocal intracranial hemorrhage, with mass effect,   ventricular effacement, and rightward midline shift by about 1.4 cm.   Critical test result.       Discussed by telephone with Dr. Coyne at time of interpretation, 1247,   12/11/2019.       This report was finalized on 12/7/2019 12:50 PM by Dr. Levon Grissom M.D.               I ordered the above noted radiological studies. Interpreted by radiologist. Discussed with radiologist (). Reviewed by me in PACS.       PROCEDURES  Critical Care  Performed by: Kulwinder Coyne MD  Authorized by: Kulwinder Coyne MD     Critical care provider statement:     Critical care time (minutes):  30    Critical care time was exclusive of:  Separately billable procedures and treating other patients    Critical care was necessary to treat or prevent imminent  or life-threatening deterioration of the following conditions:  CNS failure or compromise    Critical care was time spent personally by me on the following activities:  Ordering and performing treatments and interventions, ordering and review of laboratory studies, ordering and review of radiographic studies, pulse oximetry, re-evaluation of patient's condition, development of treatment plan with patient or surrogate, evaluation of patient's response to treatment, discussions with primary provider, examination of patient and obtaining history from patient or surrogate    I assumed direction of critical care for this patient from another provider in my specialty: no            PROGRESS AND CONSULTS  ED Course as of Dec 07 1445   Sat Dec 07, 2019   1213 I discussed the patient's condition with her nephew, Mr. Soni (he is listed as her power of  on her patient)-he does not wish for aggressive attempts at resuscitation to be made.    [WC]   1258 Case discussed with Dr. White, he agrees with admission to palliative care.    [WC]   1357 EKG performed at 12:20 AM interpreted by me shows sinus bradycardia with a heart rate of 47 the LA intervals are normal.  The QRS complexes and ST-T segments are unremarkable.  There is no significant change when compared to 10/2/2019.    [WC]      ED Course User Index  [WC] Kulwinder Coyne MD     1213 Placed nasal trumpet to left nare #7.    1244 I personally reviewed CT head which shows massive left intracranial hemorrhage. Consult placed to palliative.     1250 I re-called Mr. Soni and updated him on the pt's CT head results. He agrees with the plan to admit the pt to palliative.     1300 Pt recheck. Family are now at bedside. Updated family on test results and plan of admission. Family understands and agrees with the plan, all concerns addressed.     MEDICAL DECISION MAKING  Results were reviewed/discussed with the patient and they were also made aware of online access.  Pt also made aware that some labs, such as cultures, will not be resulted during ER visit and follow up with PMD is necessary.     MDM       DIAGNOSIS  Final diagnoses:   Spontaneous intracranial hemorrhage (CMS/HCC)       DISPOSITION  ADMISSION    Discussed treatment plan and reason for admission with pt/family and admitting physician.  Pt/family voiced understanding of the plan for admission for further testing/treatment as needed.         Latest Documented Vital Signs:  As of 1:51 PM  BP- (!) 227/66 HR- 50 Temp- 97.6 °F (36.4 °C) O2 sat- (!) 89%    --  Documentation assistance provided by latricia Milian for Dr. Coyne.  Information recorded by the scribtwila was done at my direction and has been verified and validated by me.           Richie Milian  12/07/19 8481       Richie Milian  12/07/19 5021       Kulwinder Coyne MD  12/07/19 7046

## 2019-12-07 NOTE — PLAN OF CARE
Problem: Patient Care Overview  Goal: Plan of Care Review  Outcome: Ongoing (interventions implemented as appropriate)  Flowsheets (Taken 12/7/2019 1700)  Progress: declining  Plan of Care Reviewed With: patient; family  Patient Agreement with Plan of Care: unable to participate    Patient admitted from ED this afternoon. Alcazar placed and dilaudid given for comfort. Large family at bedside and supportive of patient and just wants to see her comfortable. Turn Q4, side to side only. Mottling noted in lower extremities. Will  continue to monitor.

## 2019-12-07 NOTE — PROGRESS NOTES
Clinical Pharmacy Services: Medication History    Cristal Sasm is a 89 y.o. female presenting to [facility] for Intracerebral hemorrhage, nontraumatic (CMS/[facility]) [I61.9]  Intracerebral hemorrhage, nontraumatic (CMS/[facility]) [I61.9]    She  has a past medical history of Acute systolic CHF (congestive heart failure) (CMS/[facility]), Acute systolic CHF (congestive heart failure) (CMS/[facility]) (10/2/2019), Anemia, Arthritis, Breast cancer (CMS/[facility]), Colon polyp (04/13/2012), Colon polyps (05/23/2017), Diabetes mellitus (CMS/[facility]), Diverticulosis, GERD (gastroesophageal reflux disease), H/O Cataract, Hiatal hernia, History of diverticulitis, History of hepatitis (1958), History of kidney stone, History of peptic ulcer, History of vertigo, Hyperlipidemia, Hypertension, Inguinal hernia, Migraine, Non-STEMI (non-ST elevated myocardial infarction) (CMS/[facility]) (10/1/2019), NSTEMI (non-ST elevated myocardial infarction) (CMS/[facility]), Rheumatoid arthritis (CMS/[facility]), Sciatic leg pain, Scoliosis, Unexplained weight loss, and Visual impairment.    Allergies as of 12/07/2019 - Reviewed 12/07/2019   Allergen Reaction Noted   • Contrast dye Other (See Comments) 06/22/2016   • Novocain [procaine] Palpitations and Paresthesia 06/22/2016   • Aleve [naproxen sodium] GI Intolerance 06/22/2016   • Cortizone-10 [hydrocortisone] Other (See Comments) 06/22/2016   • Eye drops [naphazoline-polyethyl glycol] Other (See Comments) 06/22/2016   • Lipitor [atorvastatin] Other (See Comments) 06/22/2016   • Sulfur Other (See Comments) 06/22/2016   • Valium [diazepam] Other (See Comments) 06/22/2016   • Zocor [simvastatin] Other (See Comments) 06/22/2016   • Zyrtec [cetirizine] Other (See Comments) 06/22/2016       Medication information was obtained from: family  Pharmacy and Phone Number:     Prior to Admission Medications     Prescriptions Last Dose Informant Patient Reported? Taking?    amLODIPine (NORVASC) 5 MG tablet 12/6/2019  Yes Yes    Take  5 mg by mouth Daily As Needed (IF BP IS OVER 150/90).    aspirin 81 MG EC tablet 12/6/2019  No Yes    Take 1 tablet by mouth Daily.    Cholecalciferol (VITAMIN D) 2000 units tablet 12/6/2019  No Yes    Take 2,000 Units by mouth Daily.    clopidogrel (PLAVIX) 75 MG tablet 12/6/2019  No Yes    Take 1 tablet by mouth Daily.    Esomeprazole Magnesium (NEXIUM PO) 12/6/2019  Yes Yes    Take 40 mg by mouth Daily. Name Brand Only    glucose blood (ONE TOUCH ULTRA TEST) test strip   No No    Use to test BS Once Daily  DX code: E11.9    Lancets (ONETOUCH ULTRASOFT) lancets   No No    USE TO TEST BLOOD SUGAR ONCE DAILY    lisinopril (PRINIVIL,ZESTRIL) 20 MG tablet 12/6/2019  No Yes    TAKE ONE TABLET BY MOUTH DAILY    nitroglycerin (NITROSTAT) 0.4 MG SL tablet   No No    Place 1 tablet under the tongue Every 5 (Five) Minutes As Needed for Chest Pain for up to 2 doses. Take no more than 3 doses in 15 minutes.    rosuvastatin (CRESTOR) 5 MG tablet 12/6/2019  No Yes    TAKE ONE TABLET BY MOUTH DAILY            Medication notes: Family reports pt has not taken medications today. They also report that pt's allergies are most likely intolerances and not true allergies expect for the sulfa and contrast.    This medication list is complete to the best of my knowledge as of 12/7/2019    Please call if questions.    Peter Sawant PharmD  12/7/2019 2:27 PM

## 2019-12-08 NOTE — DISCHARGE SUMMARY
PHYSICIAN DISCHARGE SUMMARY                                                                        [facility]    Patient Identification:  Name: Cristal Sams  Age: 89 y.o.  Sex: female  :  1927  MRN: [id number]  Primary Care Physician: George Rock MD    Admit date: 2019  Discharge date and time:2019  Discharged Condition:   Date and time of death:2019 at 4:50 AM  Discharge Diagnoses:  Active Hospital Problems    Diagnosis  POA   • **Intracerebral hemorrhage, nontraumatic (CMS/HCC) [I61.9]  Yes   • Coronary artery disease involving native coronary artery of native heart without angina pectoris [I25.10]  Yes   • DNR (do not resuscitate) [Z66]  Yes   • Essential hypertension [I10]  Yes   • Osteoarthritis of multiple joints [M15.9]  Yes   • Gastroesophageal reflux disease with esophagitis [K21.0]  Yes      Resolved Hospital Problems   No resolved problems to display.          PMHX:   Past Medical History:   Diagnosis Date   • Acute systolic CHF (congestive heart failure) (CMS/HCC)    • Acute systolic CHF (congestive heart failure) (CMS/HCC) 10/2/2019   • Anemia    • Arthritis    • Breast cancer (CMS/HCC)     LEFT   • Colon polyp 2012    HEPATIC FLEXURE: Tubular Adenoma   • Colon polyps 2017    RT COLON POLYP: Hyperplastic; SIMOID POLYP: Tubular Adenoma   • Diabetes mellitus (CMS/HCC)     DIET CONTROLLED   • Diverticulosis    • GERD (gastroesophageal reflux disease)    • H/O Cataract    • Hiatal hernia    • History of diverticulitis    • History of hepatitis    • History of kidney stone    • History of peptic ulcer    • History of vertigo    • Hyperlipidemia    • Hypertension    • Inguinal hernia    • Migraine    • Non-STEMI (non-ST elevated myocardial infarction) (CMS/HCC) 10/1/2019   • NSTEMI (non-ST elevated myocardial infarction) (CMS/HCC)    • Rheumatoid arthritis (CMS/HCC)     • Sciatic leg pain    • Scoliosis    • Unexplained weight loss     #43 lb in 3 months    • Visual impairment      PSHX:   Past Surgical History:   Procedure Laterality Date   • BREAST BIOPSY Left 2017   • CARDIAC CATHETERIZATION N/A 10/1/2019    Procedure: Left Heart Cath;  Surgeon: Jared Zepeda MD;  Location: [facility] CATH INVASIVE LOCATION;  Service: Cardiovascular   • CARDIAC CATHETERIZATION N/A 10/1/2019    Procedure: Coronary angiography;  Surgeon: Jared Zepeda MD;  Location: [facility] CATH INVASIVE LOCATION;  Service: Cardiovascular   • CARDIAC CATHETERIZATION N/A 10/1/2019    Procedure: Left ventriculography;  Surgeon: Jared Zepeda MD;  Location: [facility] CATH INVASIVE LOCATION;  Service: Cardiovascular   • CARDIAC CATHETERIZATION N/A 10/1/2019    Procedure: Stent ROBERT coronary;  Surgeon: Jared Zepeda MD;  Location: [facility] CATH INVASIVE LOCATION;  Service: Cardiovascular   • CARDIAC SURGERY      stent   • CATARACT EXTRACTION Bilateral 2008   • COLONOSCOPY N/A 04/12/2012    HEPATIC FLEXURE: Tubular Adenoma, Vicente Monroe   • COLONOSCOPY W/ POLYPECTOMY N/A 05/23/2017    CLS 7mm RT colon polyp Hot snare 2 cm polyp on stalk, Removed w/ hot snare, Vicente Monroe   • DILATATION AND CURETTAGE     • HYSTERECTOMY     • INGUINAL HERNIA REPAIR Bilateral 6/28/2018    Procedure: INGUINAL HERNIA REPAIR LAPAROSCOPIC;  Surgeon: George Phelps MD;  Location: [facility];  Service: General   • MASTECTOMY     • MASTECTOMY WITH SENTINEL NODE BIOPSY AND AXILLARY NODE DISSECTION Left 12/7/2017    Procedure:  left total mastectomy, left axillary sentinel lymph node biopsy x 3 ;  Surgeon: Madison Ponce MD;  Location: [facility];  Service:    • SINUS SURGERY      CAUTERIZE A BLEED       Hospital Course: Cristal Sams  is a 89-year-old white female with history of CHF, anemia, arthritis, history of breast cancer, colon polyps, GERD, hypertension, hyperlipidemia, migraine and  history of MI who was admitted with history of being found in the morning to be unresponsive. She apparently been about her baseline the night before. EMS came brought to the hospital for evaluation she was found to have large intracerebral hemorrhage. The patient was unable to give any history. She was unresponsive. The patient is DNR and family at the bedside just wanted to be made comfort care and understand it her time is short. The patient is going to be admitted to the palliative care unit for comfort measures only.         The patient was admitted to the hospital for comfort measures only with massive intracranial bleed.  The patient was kept comfortable in the palliative care unit and she  from her illness.    Consults:     Consults     Date and Time Order Name Status Description    2019 1244 [facility] (on-call MD unless specified) Details Completed         Results from last 7 days   Lab Units 19  1211   WBC 10*3/mm3 20.51*   HEMOGLOBIN g/dL 11.7*   HEMATOCRIT % 34.9   PLATELETS 10*3/mm3 393     Results from last 7 days   Lab Units 19  1211   SODIUM mmol/L 135*   POTASSIUM mmol/L 3.4*   CHLORIDE mmol/L 96*   CO2 mmol/L 23.2   BUN mg/dL 12   CREATININE mg/dL 0.64   GLUCOSE mg/dL 222*   CALCIUM mg/dL 9.3     Significant Diagnostic Studies:   WBC   Date Value Ref Range Status   2019 20.51 (H) 3.40 - 10.80 10*3/mm3 Final     Hemoglobin   Date Value Ref Range Status   2019 11.7 (L) 12.0 - 15.9 g/dL Final     Hematocrit   Date Value Ref Range Status   2019 34.9 34.0 - 46.6 % Final     Platelets   Date Value Ref Range Status   2019 393 140 - 450 10*3/mm3 Final     Sodium   Date Value Ref Range Status   2019 135 (L) 136 - 145 mmol/L Final     Potassium   Date Value Ref Range Status   2019 3.4 (L) 3.5 - 5.2 mmol/L Final     Chloride   Date Value Ref Range Status   2019 96 (L) 98 - 107 mmol/L Final     CO2   Date Value Ref Range Status   2019 23.2 22.0  - 29.0 mmol/L Final     BUN   Date Value Ref Range Status   12/07/2019 12 8 - 23 mg/dL Final     Creatinine   Date Value Ref Range Status   12/07/2019 0.64 0.57 - 1.00 mg/dL Final     Glucose   Date Value Ref Range Status   12/07/2019 222 (H) 65 - 99 mg/dL Final     Calcium   Date Value Ref Range Status   12/07/2019 9.3 8.2 - 9.6 mg/dL Final     AST (SGOT)   Date Value Ref Range Status   12/07/2019 28 1 - 32 U/L Final     ALT (SGPT)   Date Value Ref Range Status   12/07/2019 14 1 - 33 U/L Final     Alkaline Phosphatase   Date Value Ref Range Status   12/07/2019 60 39 - 117 U/L Final     INR   Date Value Ref Range Status   12/07/2019 0.97 0.90 - 1.10 Final     No results found for: COLORU, CLARITYU, SPECGRAV, PHUR, PROTEINUR, GLUCOSEU, KETONESU, BLOODU, NITRITE, LEUKOCYTESUR, BILIRUBINUR, UROBILINOGEN, RBCUA, WBCUA, BACTERIA, UACOMMENT  No results found for: TROPONINT, TROPONINI, BNP  No components found for: HGBA1C;2  No components found for: TSH;2  Imaging Results (All)     Procedure Component Value Units Date/Time    CT Head Without Contrast [[id number]] Collected:  12/07/19 1244     Updated:  12/07/19 1253    Narrative:       CT HEAD WO CONTRAST-     INDICATIONS: Altered mental status     TECHNIQUE: Radiation dose reduction techniques were utilized, including  automated exposure control and exposure modulation based on body size.  Noncontrast head CT     COMPARISON: 02/16/2018     FINDINGS:     . Large intraparenchymal hemorrhage predominantly involving the left  frontal and parietal lobes, approximately 6.5 x 9.7 cm on image 30 of  series 1, with extension of hemorrhage into the left lateral ventricle,  occipital horn of the right lateral ventricle, and third ventricle,  effacement of the left lateral ventricle, rightward midline shift of  about 1.4 cm on image 23 of series 1. Edema surrounds the parenchymal  hemorrhage. An area of hemorrhage is also evident in the right parietal  lobe, about 1.5 cm on image 36  of series 1, with surrounding edema.  Additional foci of hemorrhage are seen, for example small foci more  anteriorly in the left frontal lobe such as 6 mm on image 40 and in the  left occipital lobe, 5 mm on image 24. Some subarachnoid or subdural  hemorrhage is apparent posteriorly in the left, for example image 24.  Subdural hematoma along the left periphery 3 mm in thickness on image  30. Subdural hematoma is noted along the left falx tentorium. Multiple  additional scattered foci of hemorrhage are apparent on the left.     Right sphenoid sinus is mostly opacified. Minimal fluid in right  maxillary sinus. Mastoid air cells are hypopneumatized. The visualized  paranasal sinuses, orbits, mastoid air cells are otherwise unremarkable.                   Impression:          Extensive multifocal intracranial hemorrhage, with mass effect,  ventricular effacement, and rightward midline shift by about 1.4 cm.  Critical test result.     Discussed by telephone with Dr. Coyne at time of interpretation, 1247,  12/11/2019.     This report was finalized on 12/7/2019 12:50 PM by Dr. Levon Grissom M.D.           Lab Results (last 7 days)     Procedure Component Value Units Date/Time    Comprehensive Metabolic Panel [[id number]]  (Abnormal) Collected:  12/07/19 1211    Specimen:  Blood Updated:  12/07/19 1246     Glucose 222 mg/dL      BUN 12 mg/dL      Creatinine 0.64 mg/dL      Sodium 135 mmol/L      Potassium 3.4 mmol/L      Chloride 96 mmol/L      CO2 23.2 mmol/L      Calcium 9.3 mg/dL      Total Protein 6.9 g/dL      Albumin 4.30 g/dL      ALT (SGPT) 14 U/L      AST (SGOT) 28 U/L      Alkaline Phosphatase 60 U/L      Total Bilirubin 0.4 mg/dL      eGFR Non African Amer 87 mL/min/1.73      Globulin 2.6 gm/dL      A/G Ratio 1.7 g/dL      BUN/Creatinine Ratio 18.8     Anion Gap 15.8 mmol/L     Narrative:       GFR Normal >60  Chronic Kidney Disease <60  Kidney Failure <15      Protime-INR [[id number]]  (Normal) Collected:  " 19    Specimen:  Blood Updated:  19 1240     Protime 12.6 Seconds      INR 0.97    aPTT [[id number]]  (Normal) Collected:  19    Specimen:  Blood Updated:  19 1240     PTT 25.0 seconds     CBC & Differential [[id number]] Collected:  19    Specimen:  Blood Updated:  19 122    Narrative:       The following orders were created for panel order CBC & Differential.  Procedure                               Abnormality         Status                     ---------                               -----------         ------                     CBC Auto Differential[[id number]]        Abnormal            Final result                 Please view results for these tests on the individual orders.    CBC Auto Differential [[id number]]  (Abnormal) Collected:  19    Specimen:  Blood Updated:  19     WBC 20.51 10*3/mm3      RBC 3.61 10*6/mm3      Hemoglobin 11.7 g/dL      Hematocrit 34.9 %      MCV 96.7 fL      MCH 32.4 pg      MCHC 33.5 g/dL      RDW 11.8 %      RDW-SD 41.8 fl      MPV 9.9 fL      Platelets 393 10*3/mm3      Neutrophil % 78.3 %      Lymphocyte % 16.0 %      Monocyte % 4.3 %      Eosinophil % 0.2 %      Basophil % 0.3 %      Immature Grans % 0.9 %      Neutrophils, Absolute 16.03 10*3/mm3      Lymphocytes, Absolute 3.29 10*3/mm3      Monocytes, Absolute 0.89 10*3/mm3      Eosinophils, Absolute 0.05 10*3/mm3      Basophils, Absolute 0.07 10*3/mm3      Immature Grans, Absolute 0.18 10*3/mm3      nRBC 0.0 /100 WBC         BP (!) 218/70   Pulse (!) 0   Temp 96.1 °F (35.6 °C)   Resp (!) 0   Ht 157.5 cm (62\")   Wt 51.3 kg (113 lb)   LMP  (LMP Unknown)   SpO2 99%   BMI 20.67 kg/m²     Discharge Exam:                                    Disposition:      Patient Instructions:   Discharge Order (From admission, onward)     Start     Ordered    19 0504  Discharge patient  Once     Expected Discharge Date:  19    Discharge " Disposition:      Physician of Record for Attribution - Please select from Treatment Team:  DEX WHITE [[id number]]    Review needed by CMO to determine Physician of Record:  No       Question Answer Comment   Physician of Record for Attribution - Please select from Treatment Team DEX WHITE    Review needed by CMO to determine Physician of Record No        19 0504                Total time spent discharging patient including evaluation,post hospitalization follow up,  medication and post hospitalization instructions and education total time exceeds 30 minutes.    Signed:  Dex White MD  2019  7:16 AM

## 2019-12-08 NOTE — NURSING NOTE
Time of death confirmed by 2 RNs, Rai Kearns and Cl Mckeon.  Family is at bedside and is aware.  Lianne Owens provided information on the  home.

## 2019-12-09 ENCOUNTER — TELEPHONE (OUTPATIENT)
Dept: FAMILY MEDICINE CLINIC | Facility: CLINIC | Age: 84
End: 2019-12-09

## 2019-12-09 LAB — GLUCOSE BLDC GLUCOMTR-MCNC: 198 MG/DL (ref 70–130)

## 2019-12-09 NOTE — PROGRESS NOTES
Discharge Planning Assessment  [facility]     Patient Name: Cristal Smas  MRN: [id number]  Today's Date: 2019    Admit Date: 2019    Discharge Needs Assessment    No documentation.       Discharge Plan     Row Name 19 1643       Plan    Plan Comments  The patient was transferred to [facility] from ER on 19. The patient was palliative. The patient  on 19 @ 04:50. JILL Alves RN, CCP.     Final Discharge Disposition Code  20 -     Final Note  The patient  on 19 @ 04:50. JILL Alves, RN, CCP.         Destination      Coordination has not been started for this encounter.      Durable Medical Equipment      Coordination has not been started for this encounter.      Dialysis/Infusion      Coordination has not been started for this encounter.      Home Medical Care      Coordination has not been started for this encounter.      Therapy      Coordination has not been started for this encounter.      Community Resources      Coordination has not been started for this encounter.        Expected Discharge Date and Time     Expected Discharge Date Expected Discharge Time    Dec 8, 2019         Demographic Summary    No documentation.       Functional Status    No documentation.       Psychosocial    No documentation.       Abuse/Neglect    No documentation.       Legal    No documentation.       Substance Abuse    No documentation.       Patient Forms    No documentation.           Leni Alves, HAYDER

## 2019-12-09 NOTE — PROGRESS NOTES
Case Management Discharge Note      Final Note: The patient  on 19 @ 04:50. JILL Alves RN, CCP.          Destination      No service has been selected for the patient.      Durable Medical Equipment      No service has been selected for the patient.      Dialysis/Infusion      No service has been selected for the patient.      Home Medical Care      No service has been selected for the patient.      Therapy      No service has been selected for the patient.      Community Resources      No service has been selected for the patient.             Final Discharge Disposition Code: 20 -

## 2020-12-08 NOTE — OUTREACH NOTE
Prep Survey      Responses   Facility patient discharged from?  [facility]   Is patient eligible?  Yes   Discharge diagnosis  Hyperlipemia, Non-STEMI,  Acute systolic CHF   Does the patient have one of the following disease processes/diagnoses(primary or secondary)?  Acute MI (STEMI,NSTEMI)   Medication alerts for this patient  ASA, Plavix    General alerts for this patient  Heart CAth    Prep survey completed?  Yes          Shana Prather RN         No significant past surgical history

## 2021-02-25 NOTE — PROGRESS NOTES
Cardiology Daily Progress Note    2/25/2021    Mari Robert, 1957, female    Primary Care Provider: Jalil Lovelace MD    SUBJECTIVE  Nausea and vomiting.  Left bundle branch block is resolved    OBJECTIVE    I/O's    Intake/Output Summary (Last 24 hours) at 2/25/2021 1524  Last data filed at 2/25/2021 1400  Gross per 24 hour   Intake 1702.56 ml   Output 2400 ml   Net -697.44 ml       Last Recorded Vitals  Blood pressure 130/69, pulse 81, temperature 98.1 °F (36.7 °C), temperature source Temporal, resp. rate 18, height 5' 3.78\" (1.62 m), weight 102.8 kg (226 lb 10.1 oz), SpO2 91 %.  Body mass index is 39.17 kg/m².    Physical Exam  Vitals signs and nursing note reviewed.   Constitutional:       General: She is not in acute distress.     Appearance: She is well-developed. She is not diaphoretic.   HENT:      Head: Normocephalic and atraumatic.   Eyes:      Pupils: Pupils are equal, round, and reactive to light.   Neck:      Musculoskeletal: Neck supple.      Vascular: No JVD.   Cardiovascular:      Rate and Rhythm: Normal rate and regular rhythm.      Heart sounds: No murmur. No friction rub. No gallop.    Pulmonary:      Effort: No respiratory distress.      Breath sounds: No wheezing or rales.   Chest:      Chest wall: No tenderness.   Abdominal:      General: Bowel sounds are normal. There is no distension.      Palpations: Abdomen is soft. There is no mass.      Tenderness: There is no guarding or rebound.   Musculoskeletal:         General: No tenderness.   Skin:     General: Skin is warm.      Findings: No erythema.   Neurological:      Mental Status: She is alert and oriented to person, place, and time.   Psychiatric:         Behavior: Behavior normal.         Thought Content: Thought content normal.         Current Facility-Administered Medications   Medication Dose Route Frequency Provider Last Rate Last Admin   • pantoprazole (PROTONIX INJECT) injection 40 mg  40 mg Intravenous Nightly Arturo GORDILLO  Subjective   Cristal Sams is a 89 y.o. female NEW PATIENT CONSULT FOR ELEVATED PTH WITH LAB REVIEW      History of Present Illness this is a 89-year-old female who has been referred for further evaluation and treatment of elevated levels of the parathyroid hormone.  She was found to have hypercalcemia on a laboratory evaluation of 09/19/2017 and her PTH on the that date also was high.  She is also an known patient with type II diabetes diet controlled and hypertension and dyslipidemia with vitamin D deficiency.  She had a very significantly low level of vitamin D 7.7 on her laboratory evaluation of 12/23/2016 and as of 10/06/2017 med level is is still low at 23.5.  After 12/23/2016 she was placed on vitamin D 50,000 units weekly.  She is accompanied by her niece who also told me that she has recently been diagnosed with breast cancer and she will be seeing Dr. Ponce after this office visit.  /78  Pulse 60  Wt 98 lb 6.4 oz (44.6 kg)  LMP  (LMP Unknown)  SpO2 98%  BMI 20.52 kg/m2  The following portions of the patient's history were reviewed and updated as appropriate: allergies, current medications, past family history, past medical history, past social history, past surgical history and problem list.  Allergies   Allergen Reactions   • Contrast Dye    • Novocain [Procaine] Palpitations and Parasthesia   • Aleve [Naproxen Sodium]    • Cortizone-10 [Hydrocortisone]    • Eye Drops [Naphazoline-Polyethyl Glycol]    • Lipitor [Atorvastatin]    • Sulfur    • Valium [Diazepam]    • Zocor [Simvastatin]    • Zyrtec [Cetirizine]      Current Outpatient Prescriptions:   •  amLODIPine (NORVASC) 5 MG tablet, Take 1 tablet by mouth Daily., Disp: 30 tablet, Rfl: 3  •  esomeprazole (nexIUM) 40 MG capsule, TAKE ONE CAPSULE BY MOUTH DAILY, Disp: 90 capsule, Rfl: 2  •  glucose blood (ONE TOUCH ULTRA TEST) test strip, Test Blood Sugar Once Daily, Disp: 100 each, Rfl: 2  •  Iron, Ferrous Gluconate, 256 (28 FE) MG  MD Tracie       • dextrose 5 % infusion   Intravenous Continuous PRN Roland Sommers MD       • docusate sodium-sennosides (SENOKOT S) 50-8.6 MG 2 tablet  2 tablet Oral Daily Arturo Hodges MD   2 tablet at 02/25/21 0950   • sodium chloride 0.9% infusion   Intravenous Continuous PRN Arturo Hodges MD       • ondansetron (ZOFRAN) injection 4 mg  4 mg Intravenous Q6H PRN Arturo Hodges MD   4 mg at 02/25/21 1011   • acetaminophen (TYLENOL) tablet 650 mg  650 mg Oral Q4H PRN Arturo Hodges MD   650 mg at 02/25/21 1512   • traMADol (ULTRAM) tablet 50 mg  50 mg Oral Q6H PRN Arturo Hodges MD   50 mg at 02/24/21 1415   • aspirin chewable 81 mg  81 mg Oral Daily Arturo Hodges MD   81 mg at 02/25/21 0950   • clopidogrel (PLAVIX) tablet 75 mg  75 mg Oral Daily Arturo Hodges MD   75 mg at 02/25/21 0950   • magnesium sulfate 2 g in 50 mL premix IVPB  2 g Intravenous PRN Arturo Hodges MD   Stopped at 02/24/21 1515   • magnesium sulfate 2 g in 50 mL premix IVPB  2 g Intravenous PRN Arturo Hodges MD       • magnesium sulfate 4 g in sterile water IVPB  4 g Intravenous PRN Arturo Hodges MD       • sodium chloride 0.9 % flush bag 25 mL  25 mL Intravenous PRN Arturo Hodges MD       • levothyroxine (SYNTHROID, LEVOTHROID) tablet 75 mcg  75 mcg Oral Daily Arturo Hodges MD   75 mcg at 02/25/21 0950   • hydrochlorothiazide (HYDRODIURIL) tablet 25 mg  25 mg Oral Daily Arturo Hodges MD   25 mg at 02/25/21 0950   • atorvastatin (LIPITOR) tablet 20 mg  20 mg Oral Daily Arturo Hodges MD   20 mg at 02/25/21 0950   • hydrALAZINE (APRESOLINE) injection 10 mg  10 mg Intravenous Q6H PRN Marcy Estrada CNP   10 mg at 02/24/21 1352   • ketorolac injection 15 mg  15 mg Intravenous Once Arturo Hodges MD         Labs   Admission on 02/24/2021   Component Date Value   • UNIT BLOOD TYPE 02/23/2021 O Neg    • ISBT BLOOD TYPE 02/23/2021 9500    • BLOOD EXPIRATION DATE  tablet, Take 1 tablet by mouth Daily., Disp: 30 tablet, Rfl: 3  •  Lancets (ONETOUCH ULTRASOFT) lancets, USE TO TEST BLOOD SUGAR ONCE DAILY, Disp: 100 each, Rfl: 2  •  lisinopril (PRINIVIL,ZESTRIL) 40 MG tablet, TAKE ONE TABLET BY MOUTH DAILY ++ DUE FOR AN APPOINTMENT++, Disp: 30 tablet, Rfl: 0  •  Naloxegol Oxalate (MOVANTIK) 25 MG tablet, Take 25 mg by mouth Daily., Disp: 30 tablet, Rfl: 3  •  traMADol (ULTRAM) 50 MG tablet, Take 1 tablet by mouth Every 6 (Six) Hours As Needed for Moderate Pain ., Disp: 30 tablet, Rfl: 3    Current Facility-Administered Medications:   •  cyanocobalamin injection 1,000 mcg, 1,000 mcg, Intramuscular, Q28 Days, George Rock MD, 1,000 mcg at 12/23/16 1527  •  cyanocobalamin injection 1,000 mcg, 1,000 mcg, Intramuscular, Q28 Days, George Rock MD, 1,000 mcg at 09/08/17 1219  Review of Systems   Constitutional: Negative for fever.   HENT: Negative for facial swelling, nosebleeds, trouble swallowing and voice change.    Eyes: Negative for pain and redness.   Respiratory: Negative for shortness of breath and wheezing.    Cardiovascular: Negative for palpitations and leg swelling.   Gastrointestinal: Negative for abdominal pain, diarrhea and vomiting.   Endocrine: Negative for polydipsia and polyuria.   Genitourinary: Negative for decreased urine volume and frequency.   Musculoskeletal: Negative for joint swelling and neck pain.   Skin: Negative for rash.   Allergic/Immunologic: Negative for immunocompromised state.   Neurological: Negative for seizures and facial asymmetry.   Hematological: Does not bruise/bleed easily.   Psychiatric/Behavioral: Negative for agitation and confusion.       Objective   Physical Exam   Constitutional: She is oriented to person, place, and time. She appears well-developed and well-nourished. No distress.   HENT:   Head: Normocephalic and atraumatic.   Right Ear: External ear normal.   Left Ear: External ear normal.   Nose: Nose normal.   Mouth/Throat:  02/23/2021 [id number]    • UNIT NUMBER 02/23/2021 [id number]    • DISPENSE STATUS 02/23/2021 Returned from Issue    • PRODUCT ID 02/23/2021 Red Blood Cells    • PRODUCT CODE 02/23/2021 [id number]    • PRODUCT DESCRIPTION 02/23/2021 RBC AS-3 LR    • UNIT BLOOD TYPE 02/23/2021 O Neg    • ISBT BLOOD TYPE 02/23/2021 9500    • BLOOD EXPIRATION DATE 02/23/2021 [id number]    • UNIT NUMBER 02/23/2021 [id number]    • DISPENSE STATUS 02/23/2021 Returned from Issue    • PRODUCT ID 02/23/2021 Red Blood Cells    • PRODUCT CODE 02/23/2021 [id number]    • PRODUCT DESCRIPTION 02/23/2021 RBC AS-3 LR    • UNIT BLOOD TYPE 02/23/2021 O Neg    • ISBT BLOOD TYPE 02/23/2021 9500    • BLOOD EXPIRATION DATE 02/23/2021 [id number]    • UNIT NUMBER 02/23/2021 [id number]    • DISPENSE STATUS 02/23/2021 Returned from Issue    • PRODUCT ID 02/23/2021 Red Blood Cells    • PRODUCT CODE 02/23/2021 [id number]    • PRODUCT DESCRIPTION 02/23/2021 RBC AS-3 LR    • UNIT BLOOD TYPE 02/23/2021 O Neg    • ISBT BLOOD TYPE 02/23/2021 9500    • BLOOD EXPIRATION DATE 02/23/2021 [id number]    • UNIT NUMBER 02/23/2021 [id number]    • DISPENSE STATUS 02/23/2021 Returned from Issue    • PRODUCT ID 02/23/2021 Red Blood Cells    • PRODUCT CODE 02/23/2021 [id number]    • PRODUCT DESCRIPTION 02/23/2021 RBC AS-3 LR    • CROSSMATCH RESULT 02/23/2021 Compatible    • CROSSMATCH RESULT 02/23/2021 Compatible    • CROSSMATCH RESULT 02/23/2021 Compatible    • CROSSMATCH RESULT 02/23/2021 Compatible    • ISSUE DATE/TIME 02/23/2021 [id number]    • ISSUE DATE/TIME 02/23/2021 [id number]    • ISSUE DATE/TIME 02/23/2021 [id number]    • ISSUE DATE/TIME 02/23/2021 [id number]    • Activated Clotting Time 02/24/2021 243    • PH, ARTERIAL - POINT OF * 02/24/2021 7.47*   • PCO2, ARTERIAL - POINT O* 02/24/2021 39    • PO2, ARTERIAL - POINT OF* 02/24/2021 439*   • HCO3, ARTERIAL - POINT O* 02/24/2021 28    • BASE EXCESS / DEFICIT, A* 02/24/2021 4*   • O2  Oropharynx is clear and moist. No oropharyngeal exudate.   Eyes: Conjunctivae and EOM are normal. Pupils are equal, round, and reactive to light. Right eye exhibits no discharge. Left eye exhibits no discharge. No scleral icterus.   Neck: Normal range of motion. Neck supple. No JVD present. No tracheal deviation present. No thyromegaly present.   Cardiovascular: Normal rate, regular rhythm, normal heart sounds and intact distal pulses.  Exam reveals no gallop and no friction rub.    No murmur heard.  Pulmonary/Chest: Effort normal and breath sounds normal. No stridor. No respiratory distress. She has no wheezes. She has no rales. She exhibits no tenderness.   Abdominal: Soft. Bowel sounds are normal. She exhibits no distension and no mass. There is no tenderness. There is no rebound and no guarding. No hernia.   Musculoskeletal: Normal range of motion. She exhibits no edema, tenderness or deformity.   Lymphadenopathy:     She has no cervical adenopathy.   Neurological: She is alert and oriented to person, place, and time. She has normal reflexes. She displays normal reflexes. No cranial nerve deficit. She exhibits normal muscle tone. Coordination normal.   Skin: Skin is warm and dry. No rash noted. She is not diaphoretic. No erythema. No pallor.   Psychiatric: She has a normal mood and affect. Her behavior is normal. Judgment and thought content normal.   Nursing note and vitals reviewed.        Assessment/Plan   Diagnoses and all orders for this visit:    Type 2 diabetes mellitus without complication, without long-term current use of insulin  -     T3, Free  -     T4 & TSH ([facility])  -     T4, Free  -     Uric Acid  -     Vitamin D 25 Hydroxy  -     Comprehensive Metabolic Panel  -     C-Peptide  -     Hemoglobin A1c  -     Lipid Panel  -     ACTH  -     Cortisol  -     Calcium, Ionized  -     Phosphorus  -     PTH, Intact  -     Magnesium    Mixed hyperlipidemia  -     T3, Free  -     T4 & TSH ([facility])  -     T4,  Free  -     Uric Acid  -     Vitamin D 25 Hydroxy  -     Comprehensive Metabolic Panel  -     C-Peptide  -     Hemoglobin A1c  -     Lipid Panel  -     ACTH  -     Cortisol  -     Calcium, Ionized  -     Phosphorus  -     PTH, Intact  -     Magnesium    Essential hypertension  -     T3, Free  -     T4 & TSH ([facility])  -     T4, Free  -     Uric Acid  -     Vitamin D 25 Hydroxy  -     Comprehensive Metabolic Panel  -     C-Peptide  -     Hemoglobin A1c  -     Lipid Panel  -     ACTH  -     Cortisol  -     Calcium, Ionized  -     Phosphorus  -     PTH, Intact  -     Magnesium    Hypercalcemia  -     T3, Free  -     T4 & TSH ([facility])  -     T4, Free  -     Uric Acid  -     Vitamin D 25 Hydroxy  -     Comprehensive Metabolic Panel  -     C-Peptide  -     Hemoglobin A1c  -     Lipid Panel  -     ACTH  -     Cortisol  -     Calcium, Ionized  -     Phosphorus  -     PTH, Intact  -     Magnesium    Primary hyperparathyroidism  -     T3, Free  -     T4 & TSH ([facility])  -     T4, Free  -     Uric Acid  -     Vitamin D 25 Hydroxy  -     Comprehensive Metabolic Panel  -     C-Peptide  -     Hemoglobin A1c  -     Lipid Panel  -     ACTH  -     Cortisol  -     Calcium, Ionized  -     Phosphorus  -     PTH, Intact  -     Magnesium    Vitamin D deficiency  -     T3, Free  -     T4 & TSH ([facility])  -     T4, Free  -     Uric Acid  -     Vitamin D 25 Hydroxy  -     Comprehensive Metabolic Panel  -     C-Peptide  -     Hemoglobin A1c  -     Lipid Panel  -     ACTH  -     Cortisol  -     Calcium, Ionized  -     Phosphorus  -     PTH, Intact  -     Magnesium    Other orders  -     ergocalciferol (DRISDOL) 87204 units capsule; Take 1 capsule by mouth 2 (Two) Times a Week.             in summary I saw and examined this 79-year-old female for above-mentioned problems.  I reviewed her laboratory evaluations of the 12/23/2016 as well as 09/19/2017 and 10/06/2017 and at this point we will go ahead and order a more extensive laboratory  SATURATION, ARTERIAL * 02/24/2021 100*   • TCO2 - POINT OF CARE 02/24/2021 30*   • HEMOGLOBIN - POINT OF CA* 02/24/2021 10.4*   • SODIUM - POINT OF CARE 02/24/2021 139    • POTASSIUM - POINT OF CARE 02/24/2021 3.9    • CHLORIDE - POINT OF CARE 02/24/2021 108*   • CALCIUM, IONIZED - POINT* 02/24/2021 1.20    • LACTIC ACID, ARTERIAL - * 02/24/2021 1.0    • GLUCOSE - POINT OF CARE 02/24/2021 97    • HEMATOCRIT - POINT OF CA* 02/24/2021 31.0*   • OXYHEMOGLOBIN, ARTERIAL * 02/24/2021 98.0    • METHEMOGLOBIN - POINT OF* 02/24/2021 0.8    • CARBOXYHEMOGLOBIN - POIN* 02/24/2021 0.8    • PH, ARTERIAL - POINT OF * 02/24/2021 7.46*   • PCO2, ARTERIAL - POINT O* 02/24/2021 38    • PO2, ARTERIAL - POINT OF* 02/24/2021 401*   • HCO3, ARTERIAL - POINT O* 02/24/2021 27    • BASE EXCESS / DEFICIT, A* 02/24/2021 3    • O2 SATURATION, ARTERIAL * 02/24/2021 100*   • TCO2 - POINT OF CARE 02/24/2021 28*   • HEMOGLOBIN - POINT OF CA* 02/24/2021 9.6*   • SODIUM - POINT OF CARE 02/24/2021 138    • POTASSIUM - POINT OF CARE 02/24/2021 3.5    • CHLORIDE - POINT OF CARE 02/24/2021 106    • CALCIUM, IONIZED - POINT* 02/24/2021 1.21    • LACTIC ACID, ARTERIAL - * 02/24/2021 0.9    • GLUCOSE - POINT OF CARE 02/24/2021 90    • HEMATOCRIT - POINT OF CA* 02/24/2021 29.0*   • OXYHEMOGLOBIN, ARTERIAL * 02/24/2021 98.7*   • METHEMOGLOBIN - POINT OF* 02/24/2021 0.6    • CARBOXYHEMOGLOBIN - POIN* 02/24/2021 0.3    • Ventricular Rate EKG/Min* 02/24/2021 72    • Atrial Rate (BPM) 02/24/2021 72    • OH-Interval (MSEC) 02/24/2021 188    • QRS-Interval (MSEC) 02/24/2021 164    • QT-Interval (MSEC) 02/24/2021 484    • QTc 02/24/2021 530    • P Axis (Degrees) 02/24/2021 27    • R Axis (Degrees) 02/24/2021 -45    • T Axis (Degrees) 02/24/2021 112    • REPORT TEXT 02/24/2021                      Value:Normal sinus rhythm  Left axis deviation  Left bundle branch block  Abnormal ECG  No previous ECGs available  Confirmed by YISEL MATOS MD ([id number]) on 2/25/2021  9:48:09 AM     • Ventricular Rate EKG/Min* 02/25/2021 77    • Atrial Rate (BPM) 02/25/2021 77    • AK-Interval (MSEC) 02/25/2021 172    • QRS-Interval (MSEC) 02/25/2021 100    • QT-Interval (MSEC) 02/25/2021 420    • QTc 02/25/2021 475    • P Axis (Degrees) 02/25/2021 25    • R Axis (Degrees) 02/25/2021 -25    • T Axis (Degrees) 02/25/2021 6    • REPORT TEXT 02/25/2021                      Value:Normal sinus rhythm  Incomplete right bundle branch block  Minimal voltage criteria for LVH, may be normal variant  Borderline ECG  When compared with ECG of  24-FEB-2021 12:53,  Incomplete right bundle branch block  has replaced  Left bundle branch block  Confirmed by YISEL MATOS MD [id number]) on 2/25/2021 9:48:13 AM     • Sodium 02/24/2021 141    • Potassium 02/24/2021 3.6    • Chloride 02/24/2021 104    • Carbon Dioxide 02/24/2021 25    • Anion Gap 02/24/2021 16    • Glucose 02/24/2021 101*   • BUN 02/24/2021 13    • Creatinine 02/24/2021 0.80    • Glomerular Filtration Ra* 02/24/2021 79*   • BUN/ Creatinine Ratio 02/24/2021 16    • Calcium 02/24/2021 8.6    • Magnesium 02/24/2021 1.8    • WBC 02/24/2021 10.9    • RBC 02/24/2021 3.30*   • HGB 02/24/2021 10.6*   • HCT 02/24/2021 33.0*   • MCV 02/24/2021 100.0    • MCH 02/24/2021 32.1    • MCHC 02/24/2021 32.1    • PLT 02/24/2021 242    • RDW-CV 02/24/2021 13.8    • RDW-SD 02/24/2021 50.3*   • NRBC 02/24/2021 0    • Sodium 02/25/2021 140    • Potassium 02/25/2021 4.1    • Chloride 02/25/2021 106    • Carbon Dioxide 02/25/2021 28    • Anion Gap 02/25/2021 10    • Glucose 02/25/2021 108*   • BUN 02/25/2021 10    • Creatinine 02/25/2021 0.84    • Glomerular Filtration Ra* 02/25/2021 74*   • BUN/ Creatinine Ratio 02/25/2021 12    • Calcium 02/25/2021 8.2*   • Magnesium 02/25/2021 2.2    • WBC 02/25/2021 12.0*   • RBC 02/25/2021 3.02*   • HGB 02/25/2021 9.9*   • HCT 02/25/2021 31.1*   • MCV 02/25/2021 103.0*   • MCH 02/25/2021 32.8    • MCHC 02/25/2021 31.8*   • PLT 02/25/2021  evaluation and once the results come back we will go ahead and call for any possible modification or new medication or additional evaluation.  I am going to go ahead and increase her vitamin D to 50,000 units twice weekly.  She will see Ms. Jeanne Murphy in 6 months or sooner if needed with laboratory evaluation prior to each office visit.     203    • RDW-CV 02/25/2021 13.9    • RDW-SD 02/25/2021 52.1*   • NRBC 02/25/2021 0    • Extra Tube 02/25/2021 Hold for Add Ons        ASSESSMENT AND PLAN   1.  Severe symptomatic aortic stenosis    Patient is post procedure day #1 status post transfemoral TAVR with [employer] SHIREEN 23 mm device.  The patient will an echocardiogram today.  She will continue aspirin Plavix therapy.  She will be seen by the electrophysiology service.  Discharge pending the resolution of nausea and EP consultation.    2.  Nausea and vomiting    I suspect this is related to anesthesia.  We will continue to monitor.    3.  Transient left bundle branch block    The patient will be seen by the electrophysiology service today.    4.  Hypertension    Patient blood pressure is well controlled.    5.  Anemia    Patient has chronic anemia which is slightly worse today.  The slight decrease is expected due to blood loss from procedure.      Arturo Hodges MD

## 2023-01-01 NOTE — NURSING NOTE
Alexander Soni, nephew, notified of pt's death.  He confirmed the  home for the pt.     Encounter Date: 12/31/2022       History     Chief Complaint   Patient presents with    Dizziness    Cough    Ear Fullness    Nasal Congestion     Pt presents c/o cough,dizziness, ear fullness, nasal congestion with one syncopal episode. Onset Monday.  Syncopal episode today.  Denies hitting head/blood thinners/cardiac hx.      Patient is a 48-year-old female  that presents with dizziness that has been present a few days. Associated symptoms syncope today. Surrounding information is nothing. Exacerbated by nothing. Relieved by nothing. Patient treatment prior to arrival none. Risk factors include none. Other history pertaining to this complaint nothing.       The history is provided by the patient. No  was used.   Review of patient's allergies indicates:   Allergen Reactions    Oxycodone-acetaminophen Hives and Itching     Past Medical History:   Diagnosis Date    Fibroids     Hodgkin's lymphoma      Past Surgical History:   Procedure Laterality Date    BILATERAL TUBAL LIGATION      MEDIPORT REMOVAL      PLACEMENT, MEDIPORT      SURGICAL REMOVAL OF LYMPH NODE       History reviewed. No pertinent family history.  Social History     Tobacco Use    Smoking status: Never    Smokeless tobacco: Never   Substance Use Topics    Alcohol use: Not Currently    Drug use: Not Currently     Review of Systems   Constitutional:  Negative for fever.   Respiratory:  Negative for cough and shortness of breath.    Cardiovascular:  Negative for chest pain.   Gastrointestinal:  Negative for abdominal pain.   Genitourinary:  Positive for vaginal bleeding. Negative for difficulty urinating and dysuria.   Musculoskeletal:  Negative for gait problem.   Skin:  Negative for color change.   Neurological:  Positive for dizziness and syncope. Negative for speech difficulty and headaches.   Psychiatric/Behavioral:  Negative for hallucinations and suicidal ideas.    All other systems reviewed and are negative.    Physical Exam      Initial Vitals [12/31/22 1703]   BP Pulse Resp Temp SpO2   (!) 111/97 88 16 98.2 °F (36.8 °C) 100 %      MAP       --         Physical Exam    Nursing note and vitals reviewed.  Constitutional: She appears well-developed and well-nourished.   HENT:   Head: Normocephalic.   Eyes: EOM are normal.   Neck:   Normal range of motion.  Cardiovascular:  Normal rate, regular rhythm, normal heart sounds and intact distal pulses.           Pulmonary/Chest: Breath sounds normal. No respiratory distress.   Abdominal: Abdomen is soft. Bowel sounds are normal. There is no abdominal tenderness.   Musculoskeletal:         General: Normal range of motion.      Cervical back: Normal range of motion.     Neurological: She is alert and oriented to person, place, and time. She has normal strength.   Skin: Skin is warm and dry.   Psychiatric: She has a normal mood and affect. Her behavior is normal. Judgment and thought content normal.       ED Course   Procedures  Labs Reviewed   COMPREHENSIVE METABOLIC PANEL - Abnormal; Notable for the following components:       Result Value    Chloride 108 (*)     Carbon Dioxide 20 (*)     Globulin 4.1 (*)     Albumin/Globulin Ratio 1.0 (*)     All other components within normal limits   URINALYSIS - Abnormal; Notable for the following components:    Appearance, UA Turbid (*)     Protein, UA Trace (*)     Ketones, UA Trace (*)     Blood, UA Trace (*)     Leukocyte Esterase, UA 1+ (*)     All other components within normal limits   CBC WITH DIFFERENTIAL - Abnormal; Notable for the following components:    RBC 2.74 (*)     Hgb 7.1 (*)     Hct 24.2 (*)     MCHC 29.3 (*)     RDW 15.6 (*)     Lymph # 0.41 (*)     All other components within normal limits   URINALYSIS, MICROSCOPIC - Abnormal; Notable for the following components:    WBC, UA 29 (*)     Squamous Epithelial Cells, UA 16 (*)     Bacteria, UA 1+ (*)     All other components within normal limits   TROPONIN I - Normal   COVID/FLU A&B PCR -  Normal    Narrative:     The Xpert Xpress SARS-CoV-2/FLU/RSV plus is a rapid, multiplexed real-time PCR test intended for the simultaneous qualitative detection and differentiation of SARS-CoV-2, Influenza A, Influenza B, and respiratory syncytial virus (RSV) viral RNA in either nasopharyngeal swab or nasal swab specimens.         CULTURE, URINE   CBC W/ AUTO DIFFERENTIAL    Narrative:     The following orders were created for panel order CBC Auto Differential.  Procedure                               Abnormality         Status                     ---------                               -----------         ------                     CBC with Differential[[id number]]        Abnormal            Final result                 Please view results for these tests on the individual orders.   POCT URINE PREGNANCY   TYPE & SCREEN   PREPARE RBC SOFT          Imaging Results              CT Head Without Contrast (Final result)  Result time 12/31/22 18:22:42      Final result by Juanito Downing MD (12/31/22 18:22:42)                   Impression:      No acute intracranial findings identified.      Electronically signed by: Juanito Downing  Date:    12/31/2022  Time:    18:22               Narrative:    EXAMINATION:  CT HEAD WITHOUT CONTRAST    CLINICAL HISTORY:  Syncope, simple, normal neuro exam;    TECHNIQUE:  Sequential axial images were performed of the brain without contrast.    Dose product length of 1924 mGycm. Automated exposure control was utilized to minimize radiation dose.    COMPARISON:  None available.    FINDINGS:  There is no intracranial mass effect, midline shift, hydrocephalus or hemorrhage. There is no sulcal effacement or low attenuation changes to suggest recent large vessel territory infarction.  There is no acute extra axial fluid collection. Visualized paranasal sinuses are clear without mucosal thickening, polypoidal abnormality or air-fluid levels. Mastoid air cells aeration is optimal.                                        X-Ray Chest PA And Lateral (Final result)  Result time 12/31/22 18:35:51      Final result by Juanito Downing MD (12/31/22 18:35:51)                   Impression:      No acute cardiopulmonary process identified.      Electronically signed by: Juanito Downing  Date:    12/31/2022  Time:    18:35               Narrative:    EXAMINATION:  XR CHEST PA AND LATERAL    CLINICAL HISTORY:  Cough, unspecified    TECHNIQUE:  Two-view    COMPARISON:  October 30, 2016.    FINDINGS:  Cardiopericardial silhouette is within normal limits.  Left hilar asymmetric prominence with spiculations is without significant interval difference.  Left lung calcified granuloma.  No acute dense focal or segmental consolidation, congestion, pleural effusion or pneumothorax.                                       Medications   0.9%  NaCl infusion (for blood administration) (has no administration in time range)                 ED Course as of 12/31/22 2112   Sat Dec 31, 2022   1939 Dr Jonas Negrno paged [CL]   2027 Dr Jonas Negron recommends u/s [CL]   2048 Dr. Preston accepts admit [CL]      ED Course User Index  [CL] MIKO Mahajan                 Clinical Impression:   Final diagnoses:  [R55] Syncope  [R05.9] Cough  [D64.9] Anemia, unspecified type (Primary)        ED Disposition Condition    Observation Stable                MIKO Mahajan  12/31/22 2050       IMKO Mahajan  12/31/22 2112

## (undated) DEVICE — INTRO SHEATH FLX 6F24CM

## (undated) DEVICE — TOWEL,OR,DSP,ST,BLUE,STD,4/PK,20PK/CS: Brand: MEDLINE

## (undated) DEVICE — NDL SPINE 20G 3 1/2 YEL STRL 1P/U

## (undated) DEVICE — 3M™ STERI-STRIP™ REINFORCED ADHESIVE SKIN CLOSURES, R1547, 1/2 IN X 4 IN (12 MM X 100 MM), 6 STRIPS/ENVELOPE: Brand: 3M™ STERI-STRIP™

## (undated) DEVICE — ANGIO-SEAL VIP VASCULAR CLOSURE DEVICE: Brand: ANGIO-SEAL

## (undated) DEVICE — KT MANIFLD CARDIAC

## (undated) DEVICE — PK CATH CARD 40

## (undated) DEVICE — PERCLOSE PROGLIDE™ SUTURE-MEDIATED CLOSURE SYSTEM: Brand: PERCLOSE PROGLIDE™

## (undated) DEVICE — GLV SURG BIOGEL LTX PF 8

## (undated) DEVICE — TREK CORONARY DILATATION CATHETER 2.50 MM X 15 MM / RAPID-EXCHANGE: Brand: TREK

## (undated) DEVICE — OVAL SHAPE BALLOON: Brand: EXTRA VIEW

## (undated) DEVICE — RADIFOCUS OPTITORQUE ANGIOGRAPHIC CATHETER: Brand: OPTITORQUE

## (undated) DEVICE — 100% SILICONE FOLEY TRAY,16 FR/CH (5.3 MM), 5 ML CATHETER PRE-CONNECTED TO 2000 ML DRAINAGE BAG WITH LUER-LOCK SAMPLING AND ADHESIVE CATHETER SECUREMENT: Brand: DOVER

## (undated) DEVICE — 3M™ STERI-STRIP™ COMPOUND BENZOIN TINCTURE 40 BAGS/CARTON 4 CARTONS/CASE C1544: Brand: 3M™ STERI-STRIP™

## (undated) DEVICE — CATH VENT MIV RADL PIG ST TIP 5F 110CM

## (undated) DEVICE — SUT VIC 0 TN 27IN DYED JTN0G

## (undated) DEVICE — RUNTHROUGH NS EXTRA FLOPPY PTCA GUIDEWIRE: Brand: RUNTHROUGH

## (undated) DEVICE — DECANT BG O JET

## (undated) DEVICE — Device: Brand: PROWATER

## (undated) DEVICE — SKIN PREP TRAY W/CHG: Brand: MEDLINE INDUSTRIES, INC.

## (undated) DEVICE — CATH DIAG IMPULSE FR4 6F 100CM

## (undated) DEVICE — ENDOPATH XCEL WITH OPTIVIEW TECHNOLOGY BLADELESS TROCARS WITH STABILITY SLEEVES: Brand: ENDOPATH XCEL OPTIVIEW

## (undated) DEVICE — SPNG GZ WOVN 4X4IN 12PLY 10/BX STRL

## (undated) DEVICE — GW EMR FIX EXCHG J STD .035 3MM 260CM

## (undated) DEVICE — Device

## (undated) DEVICE — GLIDESHEATH SLENDER STAINLESS STEEL KIT: Brand: GLIDESHEATH SLENDER

## (undated) DEVICE — CONTAINER,SPECIMEN,OR STERILE,4OZ: Brand: MEDLINE

## (undated) DEVICE — RADIFOCUS GLIDEWIRE: Brand: GLIDEWIRE

## (undated) DEVICE — STRUCTURAL BALLOON TROCAR: Brand: AUTO SUTURE

## (undated) DEVICE — GAUZE,SPONGE,FLUFF,6"X6.75",STRL,10/TRAY: Brand: MEDLINE

## (undated) DEVICE — TROC SYS CANN HERN EZ PRT

## (undated) DEVICE — CATH DIAG IMPULSE FL3.5 6F 100CM

## (undated) DEVICE — BNDG ELAS ELITE V/CLOSE 6IN 5YD LF STRL

## (undated) DEVICE — INTRO SHEATH ART/FEM ENGAGE .035 6F12CM

## (undated) DEVICE — CATH GUIDE LAUNCHER EBU3.5 6F 100CM

## (undated) DEVICE — SUT VIC 3/0 SH CR8 18IN J864D

## (undated) DEVICE — SUT VIC 3/0 TIES 18IN J110T

## (undated) DEVICE — INTENDED FOR TISSUE SEPARATION, AND OTHER PROCEDURES THAT REQUIRE A SHARP SURGICAL BLADE TO PUNCTURE OR CUT.: Brand: BARD-PARKER ® CARBON RIB-BACK BLADES

## (undated) DEVICE — PK UNIV COMPL 40

## (undated) DEVICE — SUT MNCRYL 4/0 PS2 18 IN

## (undated) DEVICE — JACKSON-PRATT 100CC BULB RESERVOIR: Brand: CARDINAL HEALTH

## (undated) DEVICE — SUT ETHLN 2/0 PS 18IN 585H

## (undated) DEVICE — SPNG LAP 18X18IN LF STRL PK/5

## (undated) DEVICE — 6F .070 JL3.5 100CM: Brand: CORDIS

## (undated) DEVICE — LOU LAP CHOLE: Brand: MEDLINE INDUSTRIES, INC.

## (undated) DEVICE — ENDOCUT SCISSOR TIP, DISPOSABLE: Brand: RENEW

## (undated) DEVICE — STPLR SKIN VISISTAT WD 35CT

## (undated) DEVICE — MARKR SKIN W/RULR AND LBL

## (undated) DEVICE — NDL FLTR 19G 1 1/2 LF

## (undated) DEVICE — CVR TRANSD CIV FLX TPR 11.9 TO 3.8X61CM

## (undated) DEVICE — DRSNG WND GZ PAD BORDERED 4X8IN STRL

## (undated) DEVICE — ENDOPATH XCEL BLADELESS TROCARS WITH STABILITY SLEEVES: Brand: ENDOPATH XCEL

## (undated) DEVICE — ELECTRD BLD EXT EDGE 1P COAT 6.5IN STRL

## (undated) DEVICE — GLV SURG BIOGEL LTX PF 6

## (undated) DEVICE — PENCL E/S HNDSWTCH SMOKEEVAC HOLSTR 10FT

## (undated) DEVICE — DEV INDEFLATOR

## (undated) DEVICE — NC TREK CORONARY DILATATION CATHETER 2.75 MM X 12 MM / RAPID-EXCHANGE: Brand: NC TREK

## (undated) DEVICE — APPL CHLORAPREP W/TINT 26ML ORNG

## (undated) DEVICE — SYR LUERLOK 30CC

## (undated) DEVICE — GLV SURG BIOGEL LTX PF 7

## (undated) DEVICE — CATH DIAG EXPO .045 FL3  5F 100CM

## (undated) DEVICE — TUBING, SUCTION, 1/4" X 20', STRAIGHT: Brand: MEDLINE INDUSTRIES, INC.

## (undated) DEVICE — ENDOPATH XCEL UNIVERSAL TROCAR STABLILITY SLEEVES: Brand: ENDOPATH XCEL